# Patient Record
Sex: FEMALE | Race: WHITE | NOT HISPANIC OR LATINO | Employment: OTHER | RURAL
[De-identification: names, ages, dates, MRNs, and addresses within clinical notes are randomized per-mention and may not be internally consistent; named-entity substitution may affect disease eponyms.]

---

## 2017-01-09 ENCOUNTER — OFFICE VISIT (OUTPATIENT)
Dept: FAMILY MEDICINE CLINIC | Facility: CLINIC | Age: 72
End: 2017-01-09

## 2017-01-09 VITALS
HEART RATE: 67 BPM | HEIGHT: 64 IN | RESPIRATION RATE: 16 BRPM | DIASTOLIC BLOOD PRESSURE: 80 MMHG | SYSTOLIC BLOOD PRESSURE: 120 MMHG | WEIGHT: 276 LBS | TEMPERATURE: 98.2 F | BODY MASS INDEX: 47.12 KG/M2

## 2017-01-09 DIAGNOSIS — M54.6 THORACIC BACK PAIN, UNSPECIFIED BACK PAIN LATERALITY, UNSPECIFIED CHRONICITY: Primary | ICD-10-CM

## 2017-01-09 PROCEDURE — 99214 OFFICE O/P EST MOD 30 MIN: CPT | Performed by: NURSE PRACTITIONER

## 2017-01-09 PROCEDURE — 96372 THER/PROPH/DIAG INJ SC/IM: CPT | Performed by: NURSE PRACTITIONER

## 2017-01-09 RX ORDER — BETAMETHASONE SODIUM PHOSPHATE AND BETAMETHASONE ACETATE 3; 3 MG/ML; MG/ML
12 INJECTION, SUSPENSION INTRA-ARTICULAR; INTRALESIONAL; INTRAMUSCULAR; SOFT TISSUE ONCE
Status: COMPLETED | OUTPATIENT
Start: 2017-01-09 | End: 2017-01-09

## 2017-01-09 RX ORDER — TIZANIDINE HYDROCHLORIDE 2 MG/1
2 CAPSULE, GELATIN COATED ORAL 2 TIMES DAILY
Qty: 60 CAPSULE | Refills: 3 | Status: SHIPPED | OUTPATIENT
Start: 2017-01-09 | End: 2017-01-30 | Stop reason: SDUPTHER

## 2017-01-09 RX ADMIN — BETAMETHASONE SODIUM PHOSPHATE AND BETAMETHASONE ACETATE 12 MG: 3; 3 INJECTION, SUSPENSION INTRA-ARTICULAR; INTRALESIONAL; INTRAMUSCULAR; SOFT TISSUE at 10:01

## 2017-01-09 NOTE — PROGRESS NOTES
Subjective   Juliana Alcazar is a 71 y.o. female.     Back Pain   This is a chronic (picked up a 30 lb bag of cat litter and shortly afterward, her back started to hurt.) problem. The current episode started in the past 7 days (thoracic  back pain flaired up about 1 week ago). The problem occurs constantly. The problem has been gradually worsening since onset. The pain is present in the thoracic spine. The quality of the pain is described as aching. The pain does not radiate. The pain is at a severity of 6/10. The pain is moderate. The pain is the same all the time. The symptoms are aggravated by lying down, position, standing and twisting. Stiffness is present in the morning. Pertinent negatives include no abdominal pain, bladder incontinence, bowel incontinence, chest pain, dysuria, fever, headaches, leg pain, numbness, paresis, paresthesias, pelvic pain, perianal numbness, tingling, weakness or weight loss. Risk factors include menopause, obesity and sedentary lifestyle. She has tried analgesics for the symptoms. The treatment provided moderate relief.        The following portions of the patient's history were reviewed and updated as appropriate: allergies, current medications, past family history, past medical history, past social history, past surgical history and problem list.    Review of Systems   Constitutional: Negative.  Negative for fever and weight loss.   HENT: Negative.    Respiratory: Negative.    Cardiovascular: Negative.  Negative for chest pain.   Gastrointestinal: Negative for abdominal pain and bowel incontinence.   Genitourinary: Negative for bladder incontinence, dysuria and pelvic pain.   Musculoskeletal: Positive for back pain (thoracic pain).   Skin: Negative.    Neurological: Negative.  Negative for tingling, weakness, numbness, headaches and paresthesias.   Psychiatric/Behavioral: Negative.        Objective   Physical Exam   Constitutional: She is oriented to person, place, and time. She  appears well-developed and well-nourished. No distress.   HENT:   Head: Normocephalic.   Eyes: Pupils are equal, round, and reactive to light.   Neck: Normal range of motion. Neck supple. No thyromegaly present.   Cardiovascular: Normal rate, regular rhythm and normal heart sounds.  Exam reveals no friction rub.    No murmur heard.  Pulmonary/Chest: Effort normal and breath sounds normal.   Abdominal: Soft.   Musculoskeletal:        Thoracic back: She exhibits tenderness, pain and spasm.   X rays are reviewed and show degenerative changes of thoracic spine.     Neurological: She is alert and oriented to person, place, and time.   Skin: Skin is warm and dry.   Psychiatric: She has a normal mood and affect. Thought content normal.   Nursing note and vitals reviewed.      Assessment/Plan   Juliana was seen today for back pain.    Diagnoses and all orders for this visit:    Thoracic back pain, unspecified back pain laterality, unspecified chronicity  -     XR Spine Thoracic 3 View (In Office)  -     TiZANidine (ZANAFLEX) 2 MG capsule; Take 1 capsule by mouth 2 (Two) Times a Day. For back pain  -     betamethasone acetate-betamethasone sodium phosphate (CELESTONE SOLUSPAN) injection 12 mg; Inject 2 mL into the shoulder, thigh, or buttocks 1 (One) Time.    she was out of her muscle relaxer.  Will refill and give her cortisone today.  Jean-Pierre in about 2 weeks.

## 2017-01-09 NOTE — MR AVS SNAPSHOT
Juliana Alcazar   1/9/2017 9:00 AM   Office Visit    Dept Phone:  [phone]   Encounter #:  [id number]    Provider:  KAVITHA Moreno   Department:  [facility]                Your Full Care Plan              Today's Medication Changes          These changes are accurate as of: 1/9/17 10:01 AM.  If you have any questions, ask your nurse or doctor.               Medication(s)that have changed:     TiZANidine 2 MG capsule   Commonly known as:  ZANAFLEX   Take 1 capsule by mouth 2 (Two) Times a Day. For back pain   What changed:  additional instructions   Changed by:  KAVITHA Moreno            Where to Get Your Medications      These medications were sent to [facility] [address] [place] [address] DRIVE - [phone]  - [phone] [address] [address]     Phone:  [phone]     TiZANidine 2 MG capsule                  Your Updated Medication List          This list is accurate as of: 1/9/17 10:01 AM.  Always use your most recent med list.                atorvastatin 40 MG tablet   Commonly known as:  LIPITOR       diclofenac 75 MG EC tablet   Commonly known as:  VOLTAREN       doxepin 10 MG capsule   Commonly known as:  SINEquan       doxycycline 100 MG capsule   Commonly known as:  VIBRAMYCIN       furosemide 20 MG tablet   Commonly known as:  LASIX       lisinopril 20 MG tablet   Commonly known as:  PRINIVIL,ZESTRIL       metFORMIN 500 MG tablet   Commonly known as:  GLUCOPHAGE       metoprolol tartrate 50 MG tablet   Commonly known as:  LOPRESSOR       omeprazole 20 MG capsule   Commonly known as:  priLOSEC       rivaroxaban 20 MG tablet   Commonly known as:  XARELTO       sertraline 100 MG tablet   Commonly known as:  ZOLOFT       TiZANidine 2 MG capsule   Commonly known as:  ZANAFLEX   Take 1 capsule by mouth 2 (Two) Times a Day. For back pain               We Performed the Following      XR Spine Thoracic 3 View (In Office)       You Were Diagnosed With        Codes Comments    Thoracic back pain, unspecified back pain laterality, unspecified chronicity    -  Primary ICD-10-CM: M54.6  ICD-9-CM: 724.1       Medications to be Given to You by a Medical Professional     Due       Frequency    2017 betamethasone acetate-betamethasone sodium phosphate (CELESTONE SOLUSPAN) injection 12 mg  Once      Instructions     None    Patient Instructions History      Upcoming Appointments     Visit Type Date Time Department    OFFICE VISIT 2017  9:00 AM [facility]    OFFICE VISIT 2017  8:00 AM [facility]      Expo[employer]ert Signup     [facility] [email or web address] allows you to send messages to your doctor, view your test results, renew your prescriptions, schedule appointments, and more. To sign up, go to myThings and click on the Sign Up Now link in the New User? box. Enter your [email or web address] Activation Code exactly as it appears below along with the last four digits of your Social Security Number and your Date of Birth () to complete the sign-up process. If you do not sign up before the expiration date, you must request a new code.    [email or web address] Activation Code: [email or web address]  Expires: 2017 10:01 AM    If you have questions, you can email [email or web address] or call [phone] to talk to our [email or web address] staff. Remember, [email or web address] is NOT to be used for urgent needs. For medical emergencies, dial 911.               Other Info from Your Visit           Your Appointments     2017  8:00 AM CST   Office Visit with KAVITHA Moreno   [facility] (--)    [facility] [place] [address] [place] [address]  [facility] [address] [phone]           Arrive 15 minutes prior to appointment.              Allergies     Adhesive Tape      Celebrex [Celecoxib]      Darvon [Propoxyphene]       "Demerol [Meperidine]      Dilantin [Phenytoin]      Iodinated Diagnostic Agents      Latex      Morphine And Related      Penicillins      Phenergan [Promethazine]      Vioxx [Rofecoxib]      Vistaril [Hydroxyzine Hcl]        Reason for Visit     Back Pain           Vital Signs     Blood Pressure Pulse Temperature Respirations Height Weight    120/80 67 98.2 °F (36.8 °C) 16 64\" (162.6 cm) 276 lb (125 kg)    Body Mass Index Smoking Status                47.38 kg/m2 Never Smoker          Problems and Diagnoses Noted     Back pain    -  Primary        "

## 2017-01-30 ENCOUNTER — OFFICE VISIT (OUTPATIENT)
Dept: FAMILY MEDICINE CLINIC | Facility: CLINIC | Age: 72
End: 2017-01-30

## 2017-01-30 VITALS
TEMPERATURE: 97.9 F | HEIGHT: 64 IN | HEART RATE: 87 BPM | WEIGHT: 270 LBS | SYSTOLIC BLOOD PRESSURE: 160 MMHG | DIASTOLIC BLOOD PRESSURE: 82 MMHG | BODY MASS INDEX: 46.1 KG/M2 | RESPIRATION RATE: 16 BRPM

## 2017-01-30 DIAGNOSIS — K21.9 GASTROESOPHAGEAL REFLUX DISEASE WITHOUT ESOPHAGITIS: ICD-10-CM

## 2017-01-30 DIAGNOSIS — F32.A DEPRESSION, UNSPECIFIED DEPRESSION TYPE: ICD-10-CM

## 2017-01-30 DIAGNOSIS — E11.9 TYPE 2 DIABETES MELLITUS WITHOUT COMPLICATION, WITHOUT LONG-TERM CURRENT USE OF INSULIN (HCC): ICD-10-CM

## 2017-01-30 DIAGNOSIS — E78.5 HYPERLIPIDEMIA, UNSPECIFIED HYPERLIPIDEMIA TYPE: ICD-10-CM

## 2017-01-30 DIAGNOSIS — I10 ESSENTIAL HYPERTENSION: ICD-10-CM

## 2017-01-30 DIAGNOSIS — M54.6 THORACIC BACK PAIN, UNSPECIFIED BACK PAIN LATERALITY, UNSPECIFIED CHRONICITY: Primary | ICD-10-CM

## 2017-01-30 DIAGNOSIS — I48.91 ATRIAL FIBRILLATION, UNSPECIFIED TYPE (HCC): ICD-10-CM

## 2017-01-30 DIAGNOSIS — Z11.59 NEED FOR HEPATITIS C SCREENING TEST: ICD-10-CM

## 2017-01-30 LAB
ALBUMIN SERPL-MCNC: 4.3 G/DL (ref 3.4–4.8)
ALBUMIN/GLOB SERPL: 1.5 G/DL (ref 1.1–1.8)
ALP SERPL-CCNC: 65 U/L (ref 38–126)
ALT SERPL W P-5'-P-CCNC: 31 U/L (ref 9–52)
ANION GAP SERPL CALCULATED.3IONS-SCNC: 10 MMOL/L (ref 5–15)
ARTICHOKE IGE QN: 93 MG/DL (ref 1–129)
AST SERPL-CCNC: 21 U/L (ref 14–36)
BILIRUB SERPL-MCNC: 0.6 MG/DL (ref 0.2–1.3)
BUN BLD-MCNC: 13 MG/DL (ref 7–21)
BUN/CREAT SERPL: 18.3 (ref 7–25)
CALCIUM SPEC-SCNC: 9.6 MG/DL (ref 8.4–10.2)
CHLORIDE SERPL-SCNC: 101 MMOL/L (ref 95–110)
CHOLEST SERPL-MCNC: 176 MG/DL (ref 0–199)
CO2 SERPL-SCNC: 30 MMOL/L (ref 22–31)
CREAT BLD-MCNC: 0.71 MG/DL (ref 0.5–1)
GFR SERPL CREATININE-BSD FRML MDRD: 81 ML/MIN/1.73 (ref 39–90)
GLOBULIN UR ELPH-MCNC: 2.8 GM/DL (ref 2.3–3.5)
GLUCOSE BLD-MCNC: 105 MG/DL (ref 60–100)
HBA1C MFR BLD: 6.32 % (ref 4–5.6)
HDLC SERPL-MCNC: 32 MG/DL (ref 60–200)
LDLC/HDLC SERPL: 2.32 {RATIO} (ref 0–3.22)
POTASSIUM BLD-SCNC: 4.7 MMOL/L (ref 3.5–5.1)
PROT SERPL-MCNC: 7.1 G/DL (ref 6.3–8.6)
SODIUM BLD-SCNC: 141 MMOL/L (ref 137–145)
TRIGL SERPL-MCNC: 349 MG/DL (ref 20–199)

## 2017-01-30 PROCEDURE — 99214 OFFICE O/P EST MOD 30 MIN: CPT | Performed by: NURSE PRACTITIONER

## 2017-01-30 PROCEDURE — 80053 COMPREHEN METABOLIC PANEL: CPT | Performed by: NURSE PRACTITIONER

## 2017-01-30 PROCEDURE — 83036 HEMOGLOBIN GLYCOSYLATED A1C: CPT | Performed by: NURSE PRACTITIONER

## 2017-01-30 PROCEDURE — 80061 LIPID PANEL: CPT | Performed by: NURSE PRACTITIONER

## 2017-01-30 PROCEDURE — 86803 HEPATITIS C AB TEST: CPT | Performed by: NURSE PRACTITIONER

## 2017-01-30 RX ORDER — SERTRALINE HYDROCHLORIDE 100 MG/1
100 TABLET, FILM COATED ORAL DAILY
Qty: 30 TABLET | Refills: 5 | Status: SHIPPED | OUTPATIENT
Start: 2017-01-30 | End: 2017-01-30 | Stop reason: SDUPTHER

## 2017-01-30 RX ORDER — TRAMADOL HYDROCHLORIDE 50 MG/1
50 TABLET ORAL EVERY 8 HOURS PRN
Qty: 45 TABLET | Refills: 0 | Status: SHIPPED | OUTPATIENT
Start: 2017-01-30 | End: 2017-03-30

## 2017-01-30 RX ORDER — ATORVASTATIN CALCIUM 40 MG/1
40 TABLET, FILM COATED ORAL NIGHTLY
Qty: 30 TABLET | Refills: 5 | Status: SHIPPED | OUTPATIENT
Start: 2017-01-30 | End: 2017-02-27 | Stop reason: SDUPTHER

## 2017-01-30 RX ORDER — TIZANIDINE HYDROCHLORIDE 2 MG/1
2 CAPSULE, GELATIN COATED ORAL 2 TIMES DAILY
Qty: 60 CAPSULE | Refills: 5 | Status: SHIPPED | OUTPATIENT
Start: 2017-01-30 | End: 2017-02-27 | Stop reason: SDUPTHER

## 2017-01-30 RX ORDER — SERTRALINE HYDROCHLORIDE 100 MG/1
TABLET, FILM COATED ORAL
Qty: 30 TABLET | Refills: 5 | Status: SHIPPED | OUTPATIENT
Start: 2017-01-30 | End: 2017-02-27 | Stop reason: SDUPTHER

## 2017-01-30 RX ORDER — DICLOFENAC SODIUM 75 MG/1
75 TABLET, DELAYED RELEASE ORAL 2 TIMES DAILY
Qty: 60 TABLET | Refills: 5 | Status: SHIPPED | OUTPATIENT
Start: 2017-01-30 | End: 2017-02-27 | Stop reason: SDUPTHER

## 2017-01-30 RX ORDER — FUROSEMIDE 20 MG/1
20 TABLET ORAL DAILY
Qty: 30 TABLET | Refills: 5 | Status: SHIPPED | OUTPATIENT
Start: 2017-01-30 | End: 2017-02-27 | Stop reason: SDUPTHER

## 2017-01-30 RX ORDER — METOPROLOL TARTRATE 50 MG/1
50 TABLET, FILM COATED ORAL 2 TIMES DAILY
Qty: 60 TABLET | Refills: 5 | Status: SHIPPED | OUTPATIENT
Start: 2017-01-30 | End: 2017-02-27 | Stop reason: SDUPTHER

## 2017-01-30 RX ORDER — OMEPRAZOLE 20 MG/1
20 CAPSULE, DELAYED RELEASE ORAL 2 TIMES DAILY
Qty: 60 CAPSULE | Refills: 5 | Status: SHIPPED | OUTPATIENT
Start: 2017-01-30 | End: 2017-02-27 | Stop reason: SDUPTHER

## 2017-01-30 RX ORDER — LISINOPRIL 20 MG/1
20 TABLET ORAL DAILY
Qty: 30 TABLET | Refills: 5 | Status: SHIPPED | OUTPATIENT
Start: 2017-01-30 | End: 2017-02-27 | Stop reason: SDUPTHER

## 2017-01-30 NOTE — PROGRESS NOTES
Subjective   Juliana Alcazar is a 71 y.o. female.     Back Pain   This is a recurrent problem. The current episode started more than 1 month ago. The problem occurs intermittently. The problem has been waxing and waning (severe pain happens about 2 or 3 x a week now.) since onset. The pain is present in the thoracic spine. The pain is at a severity of 6/10. The pain is moderate. Exacerbated by: using arms. Stiffness is present in the morning. Pertinent negatives include no abdominal pain, bladder incontinence, bowel incontinence, chest pain, dysuria, fever, headaches, leg pain, numbness, paresis, paresthesias, pelvic pain, perianal numbness, tingling, weakness or weight loss. She has tried muscle relaxant, NSAIDs and analgesics for the symptoms. The treatment provided moderate relief.   Diabetes   She presents for her follow-up diabetic visit. She has type 2 diabetes mellitus. Her disease course has been stable. There are no hypoglycemic associated symptoms. Pertinent negatives for hypoglycemia include no headaches. There are no diabetic associated symptoms. Pertinent negatives for diabetes include no chest pain, no weakness and no weight loss. There are no hypoglycemic complications. Symptoms are stable. There are no diabetic complications. Risk factors for coronary artery disease include diabetes mellitus, obesity, sedentary lifestyle, post-menopausal, hypertension and family history. Current diabetic treatment includes oral agent (monotherapy). She is compliant with treatment most of the time. Her weight is stable. She is following a diabetic diet. When asked about meal planning, she reported none. She has not had a previous visit with a dietitian. She rarely participates in exercise. She monitors blood glucose at home 3-4 x per week. Her breakfast blood glucose is taken between 6-7 am. Her breakfast blood glucose range is generally 130-140 mg/dl. An ACE inhibitor/angiotensin II receptor blocker is being taken.  She does not see a podiatrist.Eye exam is current.        The following portions of the patient's history were reviewed and updated as appropriate: allergies, current medications, past family history, past medical history, past social history, past surgical history and problem list.    Review of Systems   Constitutional: Negative.  Negative for fever and weight loss.   HENT: Negative.    Respiratory: Negative.    Cardiovascular: Negative.  Negative for chest pain.   Gastrointestinal: Negative for abdominal pain and bowel incontinence.   Genitourinary: Negative for bladder incontinence, dysuria and pelvic pain.   Musculoskeletal: Back pain: cont to have thoracic back pain  due to lifting heavy object.   Neurological: Negative.  Negative for tingling, weakness, numbness, headaches and paresthesias.   Psychiatric/Behavioral: Negative.        Objective   Physical Exam   Constitutional: She is oriented to person, place, and time. She appears well-developed and well-nourished. No distress.   HENT:   Head: Atraumatic.   Eyes: EOM are normal. Pupils are equal, round, and reactive to light.   Neck: Normal range of motion. Neck supple. No thyromegaly present.   Cardiovascular: Normal rate, regular rhythm and normal heart sounds.  Exam reveals no friction rub.    No murmur heard.  Pulmonary/Chest: Effort normal and breath sounds normal. No respiratory distress. She has no wheezes. She has no rales.   Abdominal: Soft. Bowel sounds are normal. She exhibits no distension.   Musculoskeletal:        Thoracic back: She exhibits decreased range of motion, tenderness, pain and spasm.   Neurological: She is alert and oriented to person, place, and time.   Skin: Skin is warm and dry.   Psychiatric: She has a normal mood and affect. Thought content normal.   Nursing note and vitals reviewed.      Assessment/Plan   Juliana was seen today for med refill.    Diagnoses and all orders for this visit:    Thoracic back pain, unspecified back pain  laterality, unspecified chronicity  -     diclofenac (VOLTAREN) 75 MG EC tablet; Take 1 tablet by mouth 2 (Two) Times a Day.  -     TiZANidine (ZANAFLEX) 2 MG capsule; Take 1 capsule by mouth 2 (Two) Times a Day. For back pain  -     traMADol (ULTRAM) 50 MG tablet; Take 1 tablet by mouth Every 8 (Eight) Hours As Needed for moderate pain (4-6).    Hyperlipidemia, unspecified hyperlipidemia type  -     Lipid Panel  -     atorvastatin (LIPITOR) 40 MG tablet; Take 1 tablet by mouth Every Night.    Essential hypertension  -     Comprehensive Metabolic Panel  -     metoprolol tartrate (LOPRESSOR) 50 MG tablet; Take 1 tablet by mouth 2 (Two) Times a Day.  -     lisinopril (PRINIVIL,ZESTRIL) 20 MG tablet; Take 1 tablet by mouth Daily.  -     furosemide (LASIX) 20 MG tablet; Take 1 tablet by mouth Daily.    Type 2 diabetes mellitus without complication, without long-term current use of insulin  -     Hemoglobin A1c  -     metFORMIN (GLUCOPHAGE) 500 MG tablet; Take 1 tablet by mouth Daily With Breakfast.    Need for hepatitis C screening test  -     Hepatitis C Antibody    Gastroesophageal reflux disease without esophagitis  -     omeprazole (priLOSEC) 20 MG capsule; Take 1 capsule by mouth 2 (Two) Times a Day.    Atrial fibrillation, unspecified type  -     rivaroxaban (XARELTO) 20 MG tablet; Take 1 tablet by mouth Daily.    Depression, unspecified depression type  -     Discontinue: sertraline (ZOLOFT) 100 MG tablet; Take 1 tablet by mouth Daily. 1 tab in am and half a tab in pm      Health maintenance is updated.  She will be scheduled to see gastro to discuss a screening colonoscopy.  Also she is given refills on her meds.  Tramadol is added for more severe back pain.  She will also have labs drawn.

## 2017-01-30 NOTE — MR AVS SNAPSHOT
Juliana Alcazar   1/30/2017 8:00 AM   Office Visit    Dept Phone:  [phone]   Encounter #:  [id number]    Provider:  KAVITHA Moreno   Department:  [facility]                Your Full Care Plan              Today's Medication Changes          These changes are accurate as of: 1/30/17  8:56 AM.  If you have any questions, ask your nurse or doctor.               New Medication(s)Ordered:     traMADol 50 MG tablet   Commonly known as:  ULTRAM   Take 1 tablet by mouth Every 8 (Eight) Hours As Needed for moderate pain (4-6).   Started by:  KAVITHA Moreno         Medication(s)that have changed:     metFORMIN 500 MG tablet   Commonly known as:  GLUCOPHAGE   Take 1 tablet by mouth Daily With Breakfast.   What changed:  when to take this   Changed by:  KAVITHA Moreno       sertraline 100 MG tablet   Commonly known as:  ZOLOFT   Take 1 tablet by mouth Daily. 1 tab in am and half a tab in pm   What changed:    - how much to take  - when to take this   Changed by:  KAVITHA Moreno            Where to Get Your Medications      These medications were sent to [facility] [address] [place] [address] - [phone]  - [phone] [place] [address]     Phone:  [phone]     atorvastatin 40 MG tablet    diclofenac 75 MG EC tablet    furosemide 20 MG tablet    lisinopril 20 MG tablet    metFORMIN 500 MG tablet    metoprolol tartrate 50 MG tablet    omeprazole 20 MG capsule    rivaroxaban 20 MG tablet    sertraline 100 MG tablet    TiZANidine 2 MG capsule         You can get these medications from any pharmacy     Bring a paper prescription for each of these medications     traMADol 50 MG tablet                  Your Updated Medication List          This list is accurate as of: 1/30/17  8:56 AM.  Always use your most recent med list.                atorvastatin 40 MG tablet      Commonly known as:  LIPITOR   Take 1 tablet by mouth Every Night.       diclofenac 75 MG EC tablet   Commonly known as:  VOLTAREN   Take 1 tablet by mouth 2 (Two) Times a Day.       doxepin 10 MG capsule   Commonly known as:  SINEquan       doxycycline 100 MG capsule   Commonly known as:  VIBRAMYCIN       furosemide 20 MG tablet   Commonly known as:  LASIX   Take 1 tablet by mouth Daily.       lisinopril 20 MG tablet   Commonly known as:  PRINIVIL,ZESTRIL   Take 1 tablet by mouth Daily.       metFORMIN 500 MG tablet   Commonly known as:  GLUCOPHAGE   Take 1 tablet by mouth Daily With Breakfast.       metoprolol tartrate 50 MG tablet   Commonly known as:  LOPRESSOR   Take 1 tablet by mouth 2 (Two) Times a Day.       omeprazole 20 MG capsule   Commonly known as:  priLOSEC   Take 1 capsule by mouth 2 (Two) Times a Day.       rivaroxaban 20 MG tablet   Commonly known as:  XARELTO   Take 1 tablet by mouth Daily.       sertraline 100 MG tablet   Commonly known as:  ZOLOFT   Take 1 tablet by mouth Daily. 1 tab in am and half a tab in pm       TiZANidine 2 MG capsule   Commonly known as:  ZANAFLEX   Take 1 capsule by mouth 2 (Two) Times a Day. For back pain       traMADol 50 MG tablet   Commonly known as:  ULTRAM   Take 1 tablet by mouth Every 8 (Eight) Hours As Needed for moderate pain (4-6).               We Performed the Following     Comprehensive Metabolic Panel     Hemoglobin A1c     Hepatitis C Antibody     Lipid Panel       You Were Diagnosed With        Codes Comments    Essential hypertension    -  Primary ICD-10-CM: I10  ICD-9-CM: 401.9     Hyperlipidemia, unspecified hyperlipidemia type     ICD-10-CM: E78.5  ICD-9-CM: 272.4     Type 2 diabetes mellitus without complication, without long-term current use of insulin     ICD-10-CM: E11.9  ICD-9-CM: 250.00     Need for hepatitis C screening test     ICD-10-CM: Z11.59  ICD-9-CM: V73.89     Thoracic back pain, unspecified back pain laterality, unspecified chronicity  "    ICD-10-CM: M54.6  ICD-9-CM: 724.1     Gastroesophageal reflux disease without esophagitis     ICD-10-CM: K21.9  ICD-9-CM: 530.81     Atrial fibrillation, unspecified type     ICD-10-CM: I48.91  ICD-9-CM: 427.31     Depression, unspecified depression type     ICD-10-CM: F32.9  ICD-9-CM: 311       Instructions     None    Patient Instructions History      Upcoming Appointments     Visit Type Date Time Department    OFFICE VISIT 1/30/2017  8:00 AM [facility]      MyChart Signup     Our records indicate that you have declined [facility] signup. If you would like to sign up for [employer], please email [email or web address] or call [phone] to obtain an activation code.             Other Info from Your Visit           Allergies     Adhesive Tape      Celebrex [Celecoxib]      Darvon [Propoxyphene]      Demerol [Meperidine]      Dilantin [Phenytoin]      Iodinated Diagnostic Agents      Latex      Morphine And Related      Penicillins      Phenergan [Promethazine]      Vioxx [Rofecoxib]      Vistaril [Hydroxyzine Hcl]        Reason for Visit     Med Refill           Vital Signs     Blood Pressure Pulse Temperature Respirations Height Weight    160/82 87 97.9 °F (36.6 °C) 16 64\" (162.6 cm) 270 lb (122 kg)    Body Mass Index Smoking Status                46.35 kg/m2 Never Smoker          Problems and Diagnoses Noted     Back pain    High blood pressure    -  Primary    High cholesterol or triglycerides        Type 2 diabetes mellitus without complication, without long-term current use of insulin        Need for hepatitis C screening test        Acid reflux disease        Atrial fibrillation (irregular heartbeat)        Depression            "

## 2017-01-31 DIAGNOSIS — E78.5 HYPERLIPIDEMIA, UNSPECIFIED HYPERLIPIDEMIA TYPE: Primary | ICD-10-CM

## 2017-01-31 LAB
HCV AB SER DONR QL: NEGATIVE
HCV S/C RATIO: 0.02 (ref 0–0.89)

## 2017-01-31 RX ORDER — FENOFIBRATE 48 MG/1
48 TABLET, COATED ORAL DAILY
Qty: 30 TABLET | Refills: 5 | Status: SHIPPED | OUTPATIENT
Start: 2017-01-31 | End: 2017-02-27 | Stop reason: SDUPTHER

## 2017-02-27 DIAGNOSIS — M54.6 THORACIC BACK PAIN, UNSPECIFIED BACK PAIN LATERALITY, UNSPECIFIED CHRONICITY: ICD-10-CM

## 2017-02-27 DIAGNOSIS — F32.A DEPRESSION, UNSPECIFIED DEPRESSION TYPE: ICD-10-CM

## 2017-02-27 DIAGNOSIS — E11.9 TYPE 2 DIABETES MELLITUS WITHOUT COMPLICATION, WITHOUT LONG-TERM CURRENT USE OF INSULIN (HCC): ICD-10-CM

## 2017-02-27 DIAGNOSIS — K21.9 GASTROESOPHAGEAL REFLUX DISEASE WITHOUT ESOPHAGITIS: ICD-10-CM

## 2017-02-27 DIAGNOSIS — E78.5 HYPERLIPIDEMIA, UNSPECIFIED HYPERLIPIDEMIA TYPE: ICD-10-CM

## 2017-02-27 DIAGNOSIS — I48.91 ATRIAL FIBRILLATION, UNSPECIFIED TYPE (HCC): ICD-10-CM

## 2017-02-27 DIAGNOSIS — I10 ESSENTIAL HYPERTENSION: ICD-10-CM

## 2017-02-27 RX ORDER — METOPROLOL TARTRATE 50 MG/1
50 TABLET, FILM COATED ORAL 2 TIMES DAILY
Qty: 180 TABLET | Refills: 1 | Status: SHIPPED | OUTPATIENT
Start: 2017-02-27 | End: 2018-03-29 | Stop reason: SDUPTHER

## 2017-02-27 RX ORDER — FENOFIBRATE 48 MG/1
48 TABLET, COATED ORAL DAILY
Qty: 30 TABLET | Refills: 5 | Status: SHIPPED | OUTPATIENT
Start: 2017-02-27 | End: 2017-03-07

## 2017-02-27 RX ORDER — ATORVASTATIN CALCIUM 40 MG/1
40 TABLET, FILM COATED ORAL NIGHTLY
Qty: 90 TABLET | Refills: 1 | Status: SHIPPED | OUTPATIENT
Start: 2017-02-27 | End: 2018-03-29 | Stop reason: SDUPTHER

## 2017-02-27 RX ORDER — TIZANIDINE HYDROCHLORIDE 2 MG/1
2 CAPSULE, GELATIN COATED ORAL 2 TIMES DAILY
Qty: 180 CAPSULE | Refills: 1 | Status: SHIPPED | OUTPATIENT
Start: 2017-02-27 | End: 2017-03-07

## 2017-02-27 RX ORDER — SERTRALINE HYDROCHLORIDE 100 MG/1
TABLET, FILM COATED ORAL
Qty: 135 TABLET | Refills: 1 | Status: SHIPPED | OUTPATIENT
Start: 2017-02-27 | End: 2018-03-29 | Stop reason: SDUPTHER

## 2017-02-27 RX ORDER — FUROSEMIDE 20 MG/1
20 TABLET ORAL DAILY
Qty: 90 TABLET | Refills: 1 | Status: SHIPPED | OUTPATIENT
Start: 2017-02-27 | End: 2018-03-29 | Stop reason: SDUPTHER

## 2017-02-27 RX ORDER — DICLOFENAC SODIUM 75 MG/1
75 TABLET, DELAYED RELEASE ORAL 2 TIMES DAILY
Qty: 180 TABLET | Refills: 1 | Status: SHIPPED | OUTPATIENT
Start: 2017-02-27 | End: 2017-03-07 | Stop reason: SDUPTHER

## 2017-02-27 RX ORDER — OMEPRAZOLE 20 MG/1
20 CAPSULE, DELAYED RELEASE ORAL 2 TIMES DAILY
Qty: 180 CAPSULE | Refills: 1 | Status: SHIPPED | OUTPATIENT
Start: 2017-02-27 | End: 2017-06-20 | Stop reason: SDDI

## 2017-02-27 RX ORDER — LISINOPRIL 20 MG/1
20 TABLET ORAL DAILY
Qty: 90 TABLET | Refills: 1 | Status: SHIPPED | OUTPATIENT
Start: 2017-02-27 | End: 2018-03-29 | Stop reason: SDUPTHER

## 2017-02-27 RX ORDER — SERTRALINE HYDROCHLORIDE 100 MG/1
TABLET, FILM COATED ORAL
Qty: 90 TABLET | Refills: 1 | Status: SHIPPED | OUTPATIENT
Start: 2017-02-27 | End: 2017-02-27 | Stop reason: SDUPTHER

## 2017-03-07 ENCOUNTER — OFFICE VISIT (OUTPATIENT)
Dept: FAMILY MEDICINE CLINIC | Facility: CLINIC | Age: 72
End: 2017-03-07

## 2017-03-07 VITALS
WEIGHT: 273 LBS | HEIGHT: 63 IN | RESPIRATION RATE: 16 BRPM | SYSTOLIC BLOOD PRESSURE: 114 MMHG | TEMPERATURE: 98.6 F | DIASTOLIC BLOOD PRESSURE: 66 MMHG | HEART RATE: 78 BPM | BODY MASS INDEX: 48.37 KG/M2

## 2017-03-07 DIAGNOSIS — M25.551 ARTHRALGIA OF RIGHT HIP: ICD-10-CM

## 2017-03-07 DIAGNOSIS — E78.5 HYPERLIPIDEMIA, UNSPECIFIED HYPERLIPIDEMIA TYPE: ICD-10-CM

## 2017-03-07 DIAGNOSIS — M54.6 THORACIC BACK PAIN, UNSPECIFIED BACK PAIN LATERALITY, UNSPECIFIED CHRONICITY: Primary | ICD-10-CM

## 2017-03-07 PROCEDURE — 99213 OFFICE O/P EST LOW 20 MIN: CPT | Performed by: NURSE PRACTITIONER

## 2017-03-07 RX ORDER — FENOFIBRATE 54 MG/1
54 TABLET ORAL DAILY
Qty: 90 TABLET | Refills: 1 | Status: SHIPPED | OUTPATIENT
Start: 2017-03-07 | End: 2018-03-29 | Stop reason: SDUPTHER

## 2017-03-07 RX ORDER — DICLOFENAC SODIUM 75 MG/1
75 TABLET, DELAYED RELEASE ORAL 2 TIMES DAILY
Qty: 180 TABLET | Refills: 1 | Status: SHIPPED | OUTPATIENT
Start: 2017-03-07 | End: 2017-03-28 | Stop reason: SINTOL

## 2017-03-07 NOTE — PROGRESS NOTES
Subjective   Juliana Alcazar is a 71 y.o. female.     HPI Comments: Here today for nissa on chronic back pain.  She says that her insurance will no longer pay for zanaflex.  She wants to go back on voltaren for her chronic pain.  She says it does help.  She has seen dr pennington for her chronic knee and hip pain.  She was told that she might need a knee replacement.    Back Pain   This is a chronic problem. The current episode started more than 1 year ago. The problem occurs constantly. The problem has been waxing and waning since onset. The pain is present in the lumbar spine. The pain radiates to the left thigh. The pain is at a severity of 8/10. The pain is moderate. The pain is the same all the time. The symptoms are aggravated by standing and coughing. Stiffness is present in the morning. Pertinent negatives include no abdominal pain, bladder incontinence, bowel incontinence, chest pain, dysuria, fever, headaches, leg pain, numbness, paresis, paresthesias, pelvic pain, perianal numbness, tingling, weakness or weight loss. Risk factors include obesity and lack of exercise. She has tried muscle relaxant and NSAIDs for the symptoms. The treatment provided moderate relief.        The following portions of the patient's history were reviewed and updated as appropriate: allergies, current medications, past family history, past medical history, past social history, past surgical history and problem list.    Review of Systems   Constitutional: Negative.  Negative for fever and weight loss.   HENT: Negative.    Respiratory: Negative.    Cardiovascular: Negative.  Negative for chest pain.   Gastrointestinal: Negative for abdominal pain and bowel incontinence.   Genitourinary: Negative for bladder incontinence, dysuria and pelvic pain.   Musculoskeletal: Positive for back pain.   Skin: Negative.    Neurological: Negative.  Negative for tingling, weakness, numbness, headaches and paresthesias.   Psychiatric/Behavioral:  Negative.        Objective   Physical Exam   Constitutional: She is oriented to person, place, and time. She appears well-developed and well-nourished. No distress.   HENT:   Head: Normocephalic.   Eyes: EOM are normal. Pupils are equal, round, and reactive to light.   Neck: Normal range of motion. Neck supple. No thyromegaly present.   Cardiovascular: Normal rate, regular rhythm and normal heart sounds.  Exam reveals no friction rub.    No murmur heard.  Pulmonary/Chest: Effort normal and breath sounds normal. No respiratory distress. She has no wheezes.   Abdominal: Soft.   Musculoskeletal:        Lumbar back: She exhibits decreased range of motion, tenderness, pain and spasm.   Neurological: She is alert and oriented to person, place, and time.   Skin: Skin is warm.   Psychiatric: She has a normal mood and affect. Thought content normal.   Nursing note and vitals reviewed.      Assessment/Plan   Juliana was seen today for back pain.    Diagnoses and all orders for this visit:    Thoracic back pain, unspecified back pain laterality, unspecified chronicity  -     diclofenac (VOLTAREN) 75 MG EC tablet; Take 1 tablet by mouth 2 (Two) Times a Day.    Hyperlipidemia, unspecified hyperlipidemia type  -     fenofibrate (LOFIBRA) 54 MG tablet; Take 1 tablet by mouth Daily.    Arthralgia of right hip  -     diclofenac (VOLTAREN) 75 MG EC tablet; Take 1 tablet by mouth 2 (Two) Times a Day.      Her insurance company will not pay for tricor, but will pay for lofibra and she is sent a new script for this med for her chol.  She will cont to take voltaren and new script for this med is sent to her pharmacy as well.

## 2017-03-28 ENCOUNTER — OFFICE VISIT (OUTPATIENT)
Dept: GASTROENTEROLOGY | Facility: CLINIC | Age: 72
End: 2017-03-28

## 2017-03-28 VITALS
HEART RATE: 77 BPM | SYSTOLIC BLOOD PRESSURE: 128 MMHG | DIASTOLIC BLOOD PRESSURE: 82 MMHG | BODY MASS INDEX: 46.78 KG/M2 | WEIGHT: 274 LBS | HEIGHT: 64 IN

## 2017-03-28 DIAGNOSIS — R13.10 DYSPHAGIA, UNSPECIFIED TYPE: ICD-10-CM

## 2017-03-28 DIAGNOSIS — K21.9 GASTROESOPHAGEAL REFLUX DISEASE, ESOPHAGITIS PRESENCE NOT SPECIFIED: ICD-10-CM

## 2017-03-28 DIAGNOSIS — Z12.11 ENCOUNTER FOR SCREENING FOR MALIGNANT NEOPLASM OF COLON: ICD-10-CM

## 2017-03-28 DIAGNOSIS — R11.0 NAUSEA: Primary | ICD-10-CM

## 2017-03-28 PROCEDURE — 99203 OFFICE O/P NEW LOW 30 MIN: CPT | Performed by: PHYSICIAN ASSISTANT

## 2017-03-28 RX ORDER — TIZANIDINE 2 MG/1
TABLET ORAL
COMMUNITY
Start: 2017-02-27 | End: 2017-06-09 | Stop reason: DRUGHIGH

## 2017-03-28 RX ORDER — SODIUM CHLORIDE 0.9 % (FLUSH) 0.9 %
1-10 SYRINGE (ML) INJECTION AS NEEDED
Status: CANCELLED | OUTPATIENT
Start: 2017-03-28

## 2017-03-28 RX ORDER — DEXTROSE AND SODIUM CHLORIDE 5; .45 G/100ML; G/100ML
30 INJECTION, SOLUTION INTRAVENOUS CONTINUOUS PRN
Status: CANCELLED | OUTPATIENT
Start: 2017-03-28

## 2017-03-28 RX ORDER — POLYETHYLENE GLYCOL 3350 17 G/17G
17 POWDER, FOR SOLUTION ORAL ONCE
Qty: 250 G | Refills: 0 | Status: SHIPPED | OUTPATIENT
Start: 2017-03-28 | End: 2017-03-28

## 2017-03-28 NOTE — PROGRESS NOTES
Chief Complaint   Patient presents with   • Eval For Colonoscopy     Ref. Miranda ORTIZ PROCEDURE ORDERED: EGD eval N/V dysphagia, prev lap band. Colon screen    Subjective    Juliana Alcazar is a 71 y.o. female. she is being seen for consultation today at the request of KAVITHA Moreno    History of Present Illness    This 71-year-old retired female was sent for evaluation for abdominal pain from Miranda who saw the patient on 3/7/17 with back pain.  Patient had a previous lap band placed in , she's not been back for a follow-up in some time.  She states her surgeon spoke harshly to her sister and she did not return to him.  She thinks her last colonoscopy was more than 10 years ago.  She thinks it was normal.  She currently denies abdominal pain, she is on Prilosec 20 mg twice a day for chronic GERD.  Since her lap band she's had difficulty with dysphagia, especially with bread.  But she has some intermittent difficulty as well.  She's had no follow-up on her LAP-BAND.  Bowel movements are regular without blood or mucus.  She is really not lost a lot of weight.    Laboratory on 17 showed a negative hepatitis C antibody.  Hemoglobin A1c was 6.32.  CMP showed glucose 105, otherwise normal.  Cholesterol panel showed triglycerides 349, otherwise normal.    Patient currently denies tobacco, alcohol, illicit substance use.  Patient has a history of diabetes, depression, atrial fibrillation with ablation in 2013 at [facility].  She's had LAP-BAND in  at [facility] in [place].  She had an ileojejunal bypass in .  Right knee replacement.  Family history diabetes, heart disease, unknown cancer, hypertension.  Father  age 69 with a AAA.  Mother  age 68 with lung cancer.  Spouse in good health,  since , previously  13 years.  One sister in good health, one sister  of lung cancer.  2 children in good health.    A/P: GERD with dysphagia, it is  possible her lap bland has slipped.  Consider peptic stricture.  Recommend EGD with possible dilatation to evaluate.  Would consider further radiographic studies to evaluate.  She is overdue for screening colonoscopy and this is scheduled as well.  We'll see her in follow-up after the above, further pending clinical course and the results of the above.    Thank you very much Anna for this consultation and for allowing us to participate in the care of your patient.  We'll keep you informed.     The following portions of the patient's history were reviewed and updated as appropriate:   Past Medical History:   Diagnosis Date   • Acute sinusitis    • Anorectal fistula     Anorectal fistula - status post repair      • Atrial fibrillation    • Backache    • Carpal tunnel syndrome    • Chest pain, non-cardiac    • Degenerative joint disease involving multiple joints    • Depressive disorder    • Diarrhea    • Dyspnea    • Electrocardiogram abnormal    • Essential hypertension    • Female stress incontinence    • Generalized anxiety disorder    • GERD (gastroesophageal reflux disease)    • H/O tubal ligation    • Hemorrhoids    • Hypercholesterolemia    • Hyperlipidemia    • Hypertensive disorder    • Low back pain     C/O - low back pain      • Normal gynecologic examination    • Obesity    • Otitis media, left    • Palpitations     a   • Primary fibromyalgia syndrome    • Sleep apnea    • Tachycardia    • Type 2 diabetes mellitus      Past Surgical History:   Procedure Laterality Date   • CARDIAC ABLATION     • ENDOSCOPY AND COLONOSCOPY  09/27/2000     A single small sessile polyp was seen in the rectum which measured 1 mm.211.3 External hemorrhoids were present. 455.3    • INJECTION OF MEDICATION  01/07/2016    Celestone (betamethasone) (2)        • KNEE ARTHROSCOPY  07/09/2008     Right total knee atthroplasty. Osteoarthritis of the right knee.    • STOMACH SURGERY  1976    Revise stomach-bowel fusion (1)    history  of jejunal surgery    • TONSILLECTOMY     • TUBAL ABDOMINAL LIGATION       Family History   Problem Relation Age of Onset   • Hypertension Other    • Lung cancer Other    • Diabetes Other    • Heart disease Other      OB History     No data available        Allergies   Allergen Reactions   • Adhesive Tape    • Celebrex [Celecoxib]    • Darvon [Propoxyphene]    • Demerol [Meperidine]    • Dilantin [Phenytoin]    • Dilaudid [Hydromorphone Hcl]    • Iodinated Diagnostic Agents    • Latex    • Morphine And Related    • Nsaids    • Penicillins    • Phenergan [Promethazine]    • Vioxx [Rofecoxib]    • Vistaril [Hydroxyzine Hcl]      Social History     Social History   • Marital status:      Spouse name: N/A   • Number of children: N/A   • Years of education: N/A     Social History Main Topics   • Smoking status: Never Smoker   • Smokeless tobacco: Never Used   • Alcohol use No   • Drug use: No   • Sexual activity: Defer     Other Topics Concern   • None     Social History Narrative       Current Outpatient Prescriptions:   •  atorvastatin (LIPITOR) 40 MG tablet, Take 1 tablet by mouth Every Night., Disp: 90 tablet, Rfl: 1  •  fenofibrate (LOFIBRA) 54 MG tablet, Take 1 tablet by mouth Daily., Disp: 90 tablet, Rfl: 1  •  furosemide (LASIX) 20 MG tablet, Take 1 tablet by mouth Daily., Disp: 90 tablet, Rfl: 1  •  lisinopril (PRINIVIL,ZESTRIL) 20 MG tablet, Take 1 tablet by mouth Daily., Disp: 90 tablet, Rfl: 1  •  metFORMIN (GLUCOPHAGE) 500 MG tablet, Take 1 tablet by mouth Daily With Breakfast., Disp: 90 tablet, Rfl: 1  •  metoprolol tartrate (LOPRESSOR) 50 MG tablet, Take 1 tablet by mouth 2 (Two) Times a Day., Disp: 180 tablet, Rfl: 1  •  omeprazole (priLOSEC) 20 MG capsule, Take 1 capsule by mouth 2 (Two) Times a Day., Disp: 180 capsule, Rfl: 1  •  rivaroxaban (XARELTO) 20 MG tablet, Take 1 tablet by mouth Daily., Disp: 90 tablet, Rfl: 1  •  sertraline (ZOLOFT) 100 MG tablet, 1 tab in am and half a tab in pm, Disp:  "135 tablet, Rfl: 1  •  tiZANidine (ZANAFLEX) 2 MG tablet, , Disp: , Rfl:   Review of Systems  Review of Systems   Constitutional: Positive for unexpected weight change.   Gastrointestinal: Positive for nausea and vomiting.   All other systems reviewed and are negative.         Objective    /82 (BP Location: Left arm)  Pulse 77  Ht 64\" (162.6 cm)  Wt 274 lb (124 kg)  BMI 47.03 kg/m2  Physical Exam   Constitutional: She is oriented to person, place, and time. She appears well-developed and well-nourished. No distress.   HENT:   Head: Normocephalic and atraumatic.   Eyes: EOM are normal. Pupils are equal, round, and reactive to light.   Neck: Normal range of motion.   Cardiovascular: Normal rate, regular rhythm and normal heart sounds.    Pulmonary/Chest: Effort normal and breath sounds normal.   Abdominal: Soft. Bowel sounds are normal. She exhibits no shifting dullness, no distension, no abdominal bruit, no ascites and no mass. There is no hepatosplenomegaly. There is tenderness. There is no rigidity, no rebound, no guarding and no CVA tenderness. No hernia. Hernia confirmed negative in the ventral area.   Mild obese, port   Musculoskeletal: Normal range of motion.   Neurological: She is alert and oriented to person, place, and time.   Skin: Skin is warm and dry.   Psychiatric: She has a normal mood and affect. Her behavior is normal. Judgment and thought content normal.   Nursing note and vitals reviewed.    Assessment/Plan      1. Nausea    2. Gastroesophageal reflux disease, esophagitis presence not specified    3. Dysphagia, unspecified type    4. Encounter for screening for malignant neoplasm of colon    .   Juliana was seen today for eval for colonoscopy.    Diagnoses and all orders for this visit:    Nausea  -     Case Request; Standing  -     Case Request    Gastroesophageal reflux disease, esophagitis presence not specified  -     Case Request; Standing  -     Case Request    Dysphagia, unspecified " type  -     Case Request; Standing  -     Case Request    Encounter for screening for malignant neoplasm of colon  -     polyethylene glycol (MIRALAX) powder; Take 17 g by mouth 1 (One) Time for 1 dose. Instructions per instruction sheet.  -     Case Request; Standing  -     sodium chloride 0.9 % flush 1-10 mL; Infuse 1-10 mL into a venous catheter As Needed for Line Care.  -     dextrose 5 % and sodium chloride 0.45 % infusion; Infuse 30 mL/hr into a venous catheter Continuous As Needed (Start Prior to Procedure).  -     Case Request    Other orders  -     Obtain Informed Consent; Standing  -     Verify Informed Consent; Standing  -     POC Glucose Fingerstick; Standing  -     Insert Peripheral IV; Standing  -     Saline Lock & Maintain IV Access; Standing        Orders placed during this encounter include:  No orders of the defined types were placed in this encounter.      Medications prescribed:  New Medications Ordered This Visit   Medications   • polyethylene glycol (MIRALAX) powder     Sig: Take 17 g by mouth 1 (One) Time for 1 dose. Instructions per instruction sheet.     Dispense:  250 g     Refill:  0     Discontinued Medications       Reason for Discontinue    diclofenac (VOLTAREN) 75 MG EC tablet Side effects        Requested Prescriptions     Signed Prescriptions Disp Refills   • polyethylene glycol (MIRALAX) powder 250 g 0     Sig: Take 17 g by mouth 1 (One) Time for 1 dose. Instructions per instruction sheet.       Review and/or summary of lab tests, radiology, procedures, medications. Review and summary of old records and obtaining of history. The risks and benefits of my recommendations, as well as other treatment options were discussed with the patient today. Questions were answered.    Follow-up: Return if symptoms worsen or fail to improve, for After the above.     ESOPHAGOGASTRODUODENOSCOPY dilation (N/A), COLONOSCOPY (N/A)      This document has been electronically signed by Jaun Knapp  DAYAN on March 30, 2017 5:52 PM      Results for orders placed or performed in visit on 01/30/17   Hepatitis C Antibody   Result Value Ref Range    HCV S/C Ratio 0.02 0.00 - 0.89    Hepatitis C Ab Negative Negative   Hemoglobin A1c   Result Value Ref Range    Hemoglobin A1C 6.32 (H) 4 - 5.6 %   Lipid Panel   Result Value Ref Range    Total Cholesterol 176 0 - 199 mg/dL    Triglycerides 349 (H) 20 - 199 mg/dL    HDL Cholesterol 32 (L) 60 - 200 mg/dL    LDL Cholesterol  93 1 - 129 mg/dL    LDL/HDL Ratio 2.32 0.00 - 3.22   Comprehensive Metabolic Panel   Result Value Ref Range    Glucose 105 (H) 60 - 100 mg/dL    BUN 13 7 - 21 mg/dL    Creatinine 0.71 0.50 - 1.00 mg/dL    Sodium 141 137 - 145 mmol/L    Potassium 4.7 3.5 - 5.1 mmol/L    Chloride 101 95 - 110 mmol/L    CO2 30.0 22.0 - 31.0 mmol/L    Calcium 9.6 8.4 - 10.2 mg/dL    Total Protein 7.1 6.3 - 8.6 g/dL    Albumin 4.30 3.40 - 4.80 g/dL    ALT (SGPT) 31 9 - 52 U/L    AST (SGOT) 21 14 - 36 U/L    Alkaline Phosphatase 65 38 - 126 U/L    Total Bilirubin 0.6 0.2 - 1.3 mg/dL    eGFR Non African Amer 81 39 - 90 mL/min/1.73    Globulin 2.8 2.3 - 3.5 gm/dL    A/G Ratio 1.5 1.1 - 1.8 g/dL    BUN/Creatinine Ratio 18.3 7.0 - 25.0    Anion Gap 10.0 5.0 - 15.0 mmol/L   Results for orders placed or performed in visit on 10/17/16    COLONOSCOPY   Result Value Ref Range     Colonoscopy        A single small sessile polyp was seen in the rectum which measured 1 mm.211.3 External hemorrhoids were present. 455.3    MAMMOGRAPHY   Result Value Ref Range     Mammogram Category 1-Negative     Results for orders placed or performed during the hospital encounter of 07/28/16   Hemoglobin A1c   Result Value Ref Range    Hemoglobin A1C 5.9 (H) 4.0 - 5.6 %TotHgb   Lipid panel   Result Value Ref Range    Total Cholesterol 162 0 - 199 mg/dl    Triglycerides 407 (H) 20 - 199 mg/dl    HDL Cholesterol 26 (L) 60 - 200 mg/dl   Comprehensive metabolic panel   Result Value Ref Range    Sodium  138 137 - 145 mmol/L    Potassium 4.7 3.5 - 5.1 mmol/L    Chloride 97 95 - 110 mmol/L    CO2 31 22 - 31 mmol/L    Anion Gap 10.0 5.0 - 15.0 mmol/L    Glucose 105 (H) 60 - 100 mg/dl    BUN 16 7 - 21 mg/dl    Creatinine 0.8 0.5 - 1.0 mg/dl    GFR MDRD Non  71 45 - 104 mL/min/1.73 sq.M    GFR MDRD  86 45 - 104 mL/min/1.73 sq.M    Calcium 9.8 8.4 - 10.2 mg/dl    Total Protein 7.3 6.3 - 8.6 gm/dl    Albumin 4.1 3.4 - 4.8 gm/dl    Total Bilirubin 0.7 0.2 - 1.3 mg/dl    Alkaline Phosphatase 63 38 - 126 U/L    AST (SGOT) 22 14 - 36 U/L    ALT (SGPT) 25 9 - 52 U/L   Results for orders placed or performed in visit on 07/28/15   Converted (Historical) Gyn Cytology   Result Value Ref Range    Spec Descr 1 SPECIMEN(S): Cervical/Endocervical     Clinical Information       CLINICAL HISTORY: Reason for Pap Smear: Routine Smear, Postmenopause    Specimen Adequacy         SPECIMEN ADEQUACY:  Satisfactory for evaluation:  endocervical/transformation zone component  absent.      Final Diagnosis         INTERPRETATION:  Negative for intraepithelial lesion or malignancy.      CONVERTED (HISTORICAL) FINAL PATHOLOGIST       Diagnostician:  MISSY MESA(ASCP)  Cytotechnologist  Electronically Signed 07/30/2015      ICD9 Diagnosis Code 1 ICD-9: V72.31     DISCLAIMER     Results for orders placed or performed in visit on 07/09/15   Hemoglobin A1c   Result Value Ref Range    Hemoglobin A1C 5.8 (H) 4.0 - 5.6 %Totb   Lipid panel   Result Value Ref Range    Total Cholesterol 183 0 - 199 mg/dl    Triglycerides 261 (H) 20 - 199 mg/dl    HDL Cholesterol 28 (L) 60 - 200 mg/dl    LDL Cholesterol  103 0 - 129 mg/dl   Comprehensive metabolic panel   Result Value Ref Range    Sodium 142 137 - 145 mmol/L    Potassium 4.7 3.5 - 5.1 mmol/L    Chloride 101 95 - 110 mmol/L    CO2 29 22 - 31 mmol/L    Anion Gap 12.0 5.0 - 15.0 mmol/L    Glucose 114 (H) 60 - 100 mg/dl    BUN 14 7 - 21 mg/dl    Creatinine 0.8 0.5 - 1.0  mg/dl    GFR MDRD Non  71 45 - 104 mL/min/1.73 sq.M    GFR MDRD  86 45 - 104 mL/min/1.73 sq.M    Calcium 9.5 8.4 - 10.2 mg/dl    Total Protein 7.1 6.3 - 8.6 gm/dl    Albumin 4.1 3.4 - 4.8 gm/dl    Total Bilirubin 0.6 0.2 - 1.3 mg/dl    Alkaline Phosphatase 66 38 - 126 U/L    AST (SGOT) 37 (H) 14 - 36 U/L    ALT (SGPT) 23 9 - 52 U/L   Results for orders placed or performed in visit on 02/28/12   Converted (Historical) Gyn Cytology   Result Value Ref Range    Spec Descr 1 SPECIMEN(S): Cervical/Endocervical     Clinical Information       CLINICAL HISTORY: Reason for Pap Smear: Routine Smear, Postmenopause    Specimen Adequacy         SPECIMEN ADEQUACY:  Satisfactory for evaluation:  endocervical/transformation zone component  present.      Final Diagnosis         INTERPRETATION:  Negative for intraepithelial lesion or malignancy.      CONVERTED (HISTORICAL) FINAL PATHOLOGIST       Diagnostician:  MISSY MESA(ASC)  Cytotechnologist  Electronically Signed 03/01/2012      ICD9 Diagnosis Code 1 ICD-9: V72.31     DISCLAIMER       The Pap smear is a screening test that aids in the detection of cervical  cancer and cancer precursors.  Both false positive and false negative  results can occur.  The test should be used at regular intervals, and positive results  should be confirmed before definitive therapy.     Results for orders placed or performed in visit on 11/02/10   Converted (Historical) Gyn Cytology   Result Value Ref Range    Spec Descr 1 SPECIMEN(S): Cervical/Endocervical     Clinical Information       CLINICAL HISTORY: Reason for Pap Smear: Routine Smear, Postmenopause    Specimen Adequacy         SPECIMEN ADEQUACY:  Satisfactory for evaluation:  endocervical/transformation zone component  present.      Final Diagnosis         INTERPRETATION:  Negative for intraepithelial lesion or malignancy.      CONVERTED (HISTORICAL) FINAL PATHOLOGIST       Diagnostician:  MISSY BRICEÑO  "CT(ASCP)  Cytotechnologist  Electronically Signed 11/04/2010      ICD9 Diagnosis Code 1 ICD-9: V72.31     DISCLAIMER       The Pap smear is a screening test that aids in the detection of cervical  cancer and cancer precursors.  Both false positive and false negative  results can occur.  The test should be used at regular intervals, and positive results  should be confirmed before definitive therapy.     Results for orders placed or performed in visit on 07/09/08   Converted Surgical Pathology   Result Value Ref Range    Spec Descr 1 SPECIMEN(S): A JOINT RESECTION, R KNEE     Clinical Information   CLINICAL HISTORY:  None Given       Gross Description         GROSS DESCRIPTION:  The specimen is labeled \"#2 bones & soft tissue l knee\" and consists of  fragments of bone and articular surfaces measuring 9.0 x 9.0 x 6.0 cm in  aggregate.  The articular cartilage is eroded, with eburnation of the  underlying bone.  Osteophyte formation is also present.  Also present in  the container are fragments of meniscus and other soft tissue measuring  5.5 x 6.0 x 2.5 cm.  Sections of soft tissue are submitted in one block.      Final Diagnosis         FINAL DIAGNOSIS:  LEFT KNEE, TOTAL ARTHROPLASTY:       CHRONIC DEGENERATIVE ARTHRITIS.    DIAGNOSIS CODE:  6      Comment   CLINICAL DIAGNOSIS:  OA, right knee       CONVERTED (HISTORICAL) FINAL PATHOLOGIST       Diagnostician:  KEVEN NOEL M.D.  Pathologist  Electronically Signed 07/11/2008     Results for orders placed or performed in visit on 04/16/07   Converted (Historical) Gyn Cytology   Result Value Ref Range    Spec Descr 1 SPECIMEN(S): Cervical/Endocervical     Clinical Information       CLINICAL HISTORY: Reason for Pap Smear: Routine Smear, Postmenopause    Comment         SPECIMEN ADEQUACY:  Satisfactory for evaluation:  endocervical/transformation zone component  present.    INTERPRETATION:  Negative for intraepithelial lesion or malignancy.      CONVERTED (HISTORICAL) " FINAL PATHOLOGIST       Diagnostician:  JOSE MESA([facility])  Cytotechnologist  Electronically Signed 04/18/2007      ICD9 Diagnosis Code 1 ICD-9: V72.31      *Note: Due to a large number of results and/or encounters for the requested time period, some results have not been displayed. A complete set of results can be found in Results Review.       Some portions of this note have been dictated using voice recognition software and may contain errors and/or omissions.

## 2017-03-31 ENCOUNTER — ANESTHESIA (OUTPATIENT)
Dept: GASTROENTEROLOGY | Facility: HOSPITAL | Age: 72
End: 2017-03-31

## 2017-03-31 ENCOUNTER — ANESTHESIA EVENT (OUTPATIENT)
Dept: GASTROENTEROLOGY | Facility: HOSPITAL | Age: 72
End: 2017-03-31

## 2017-03-31 ENCOUNTER — HOSPITAL ENCOUNTER (OUTPATIENT)
Facility: HOSPITAL | Age: 72
Setting detail: HOSPITAL OUTPATIENT SURGERY
Discharge: HOME | End: 2017-03-31
Attending: INTERNAL MEDICINE | Admitting: INTERNAL MEDICINE

## 2017-03-31 VITALS
OXYGEN SATURATION: 97 % | BODY MASS INDEX: 46.29 KG/M2 | TEMPERATURE: 97.4 F | DIASTOLIC BLOOD PRESSURE: 84 MMHG | HEIGHT: 64 IN | WEIGHT: 271.17 LBS | HEART RATE: 75 BPM | RESPIRATION RATE: 18 BRPM | SYSTOLIC BLOOD PRESSURE: 149 MMHG

## 2017-03-31 DIAGNOSIS — R11.0 NAUSEA: ICD-10-CM

## 2017-03-31 DIAGNOSIS — R13.10 DYSPHAGIA, UNSPECIFIED TYPE: ICD-10-CM

## 2017-03-31 DIAGNOSIS — K21.9 GASTROESOPHAGEAL REFLUX DISEASE, ESOPHAGITIS PRESENCE NOT SPECIFIED: ICD-10-CM

## 2017-03-31 DIAGNOSIS — Z12.11 ENCOUNTER FOR SCREENING FOR MALIGNANT NEOPLASM OF COLON: ICD-10-CM

## 2017-03-31 LAB — GLUCOSE BLDC GLUCOMTR-MCNC: 109 MG/DL (ref 70–130)

## 2017-03-31 PROCEDURE — 88342 IMHCHEM/IMCYTCHM 1ST ANTB: CPT | Performed by: INTERNAL MEDICINE

## 2017-03-31 PROCEDURE — 88305 TISSUE EXAM BY PATHOLOGIST: CPT | Performed by: INTERNAL MEDICINE

## 2017-03-31 PROCEDURE — 25010000002 PROPOFOL 10 MG/ML EMULSION: Performed by: NURSE ANESTHETIST, CERTIFIED REGISTERED

## 2017-03-31 PROCEDURE — 82962 GLUCOSE BLOOD TEST: CPT

## 2017-03-31 PROCEDURE — 88342 IMHCHEM/IMCYTCHM 1ST ANTB: CPT | Performed by: PATHOLOGY

## 2017-03-31 PROCEDURE — 25010000002 MIDAZOLAM PER 1 MG: Performed by: NURSE ANESTHETIST, CERTIFIED REGISTERED

## 2017-03-31 PROCEDURE — 43239 EGD BIOPSY SINGLE/MULTIPLE: CPT | Performed by: INTERNAL MEDICINE

## 2017-03-31 PROCEDURE — G0121 COLON CA SCRN NOT HI RSK IND: HCPCS | Performed by: INTERNAL MEDICINE

## 2017-03-31 PROCEDURE — 88305 TISSUE EXAM BY PATHOLOGIST: CPT | Performed by: PATHOLOGY

## 2017-03-31 RX ORDER — LIDOCAINE HYDROCHLORIDE 10 MG/ML
INJECTION, SOLUTION INFILTRATION; PERINEURAL AS NEEDED
Status: DISCONTINUED | OUTPATIENT
Start: 2017-03-31 | End: 2017-03-31 | Stop reason: SURG

## 2017-03-31 RX ORDER — DEXAMETHASONE SODIUM PHOSPHATE 4 MG/ML
8 INJECTION, SOLUTION INTRA-ARTICULAR; INTRALESIONAL; INTRAMUSCULAR; INTRAVENOUS; SOFT TISSUE ONCE AS NEEDED
Status: DISCONTINUED | OUTPATIENT
Start: 2017-03-31 | End: 2017-03-31 | Stop reason: HOSPADM

## 2017-03-31 RX ORDER — KETAMINE HYDROCHLORIDE 100 MG/ML
INJECTION INTRAMUSCULAR; INTRAVENOUS AS NEEDED
Status: DISCONTINUED | OUTPATIENT
Start: 2017-03-31 | End: 2017-03-31 | Stop reason: SURG

## 2017-03-31 RX ORDER — MIDAZOLAM HYDROCHLORIDE 1 MG/ML
INJECTION INTRAMUSCULAR; INTRAVENOUS AS NEEDED
Status: DISCONTINUED | OUTPATIENT
Start: 2017-03-31 | End: 2017-03-31 | Stop reason: SURG

## 2017-03-31 RX ORDER — ONDANSETRON 2 MG/ML
4 INJECTION INTRAMUSCULAR; INTRAVENOUS ONCE AS NEEDED
Status: DISCONTINUED | OUTPATIENT
Start: 2017-03-31 | End: 2017-03-31 | Stop reason: HOSPADM

## 2017-03-31 RX ORDER — PROPOFOL 10 MG/ML
VIAL (ML) INTRAVENOUS AS NEEDED
Status: DISCONTINUED | OUTPATIENT
Start: 2017-03-31 | End: 2017-03-31 | Stop reason: SURG

## 2017-03-31 RX ORDER — DEXTROSE AND SODIUM CHLORIDE 5; .45 G/100ML; G/100ML
30 INJECTION, SOLUTION INTRAVENOUS CONTINUOUS PRN
Status: DISCONTINUED | OUTPATIENT
Start: 2017-03-31 | End: 2017-03-31 | Stop reason: HOSPADM

## 2017-03-31 RX ADMIN — PROPOFOL 50 MG: 10 INJECTION, EMULSION INTRAVENOUS at 13:39

## 2017-03-31 RX ADMIN — KETAMINE HYDROCHLORIDE 60 MG: 100 INJECTION INTRAMUSCULAR; INTRAVENOUS at 13:28

## 2017-03-31 RX ADMIN — DEXTROSE AND SODIUM CHLORIDE 30 ML/HR: 5; 450 INJECTION, SOLUTION INTRAVENOUS at 13:24

## 2017-03-31 RX ADMIN — LIDOCAINE HYDROCHLORIDE 100 MG: 10 INJECTION, SOLUTION INFILTRATION; PERINEURAL at 13:28

## 2017-03-31 RX ADMIN — PROPOFOL 50 MG: 10 INJECTION, EMULSION INTRAVENOUS at 13:32

## 2017-03-31 RX ADMIN — MIDAZOLAM 2 MG: 1 INJECTION INTRAMUSCULAR; INTRAVENOUS at 13:28

## 2017-03-31 NOTE — ANESTHESIA POSTPROCEDURE EVALUATION
Patient: Juliana Alcazar    Procedure Summary     Date Anesthesia Start Anesthesia Stop Room / Location    03/31/17 1327 1349 [facility] ENDOSCOPY 1 / [facility] ENDOSCOPY       Procedure Diagnosis Surgeon Provider    ESOPHAGOGASTRODUODENOSCOPY dilation (N/A Esophagus); COLONOSCOPY (N/A ) Nausea; Encounter for screening for malignant neoplasm of colon; Gastroesophageal reflux disease, esophagitis presence not specified; Dysphagia, unspecified type  (Nausea [R11.0]; Encounter for screening for malignant neoplasm of colon [Z12.11]; Gastroesophageal reflux disease, esophagitis presence not specified [K21.9]; Dysphagia, unspecified type [R13.10]) MD Madie King CRNA          Anesthesia Type: MAC  Last vitals  /61 (03/31/17 1314)    Temp 97.2 °F (36.2 °C) (03/31/17 1314)    Pulse 81 (03/31/17 1314)   Resp 18 (03/31/17 1314)    SpO2 95 % (03/31/17 1314)      Post Anesthesia Care and Evaluation    Patient location during evaluation: bedside  Patient participation: complete - patient participated  Level of consciousness: awake and awake and alert  Pain management: adequate  Airway patency: patent  Anesthetic complications: No anesthetic complications  PONV Status: none  Cardiovascular status: acceptable  Respiratory status: acceptable  Hydration status: acceptable

## 2017-03-31 NOTE — ANESTHESIA PREPROCEDURE EVALUATION
Anesthesia Evaluation     Patient summary reviewed and Nursing notes reviewed      Airway   Mallampati: II  TM distance: <3 FB  Neck ROM: full  possible difficult intubation  Dental - normal exam     Pulmonary - normal exam   (+) shortness of breath (With exertion, METS < 4, denies CP with exertion, not worsening), sleep apnea on CPAP,   Cardiovascular - normal exam    (+) hypertension, dysrhythmias (Hx of afib, converted to NSR) Atrial Fib,       Neuro/Psych  (+) numbness, psychiatric history Depression,    GI/Hepatic/Renal/Endo    (+) morbid obesity, diabetes mellitus type 2,     Musculoskeletal     (+) myalgias,   Abdominal   (+) obese,    Substance History      OB/GYN          Other   (+) arthritis                                 Anesthesia Plan    ASA 3     MAC     Anesthetic plan and risks discussed with patient.

## 2017-04-05 LAB
LAB AP CASE REPORT: NORMAL
LAB AP DIAGNOSIS COMMENT: NORMAL
Lab: NORMAL
PATH REPORT.FINAL DX SPEC: NORMAL
PATH REPORT.GROSS SPEC: NORMAL

## 2017-06-09 ENCOUNTER — OFFICE VISIT (OUTPATIENT)
Dept: FAMILY MEDICINE CLINIC | Facility: CLINIC | Age: 72
End: 2017-06-09

## 2017-06-09 VITALS
BODY MASS INDEX: 46.26 KG/M2 | TEMPERATURE: 98.7 F | SYSTOLIC BLOOD PRESSURE: 145 MMHG | HEIGHT: 64 IN | OXYGEN SATURATION: 97 % | RESPIRATION RATE: 18 BRPM | WEIGHT: 271 LBS | DIASTOLIC BLOOD PRESSURE: 89 MMHG | HEART RATE: 70 BPM

## 2017-06-09 DIAGNOSIS — G89.29 CHRONIC LEFT-SIDED LOW BACK PAIN WITH LEFT-SIDED SCIATICA: Primary | ICD-10-CM

## 2017-06-09 DIAGNOSIS — M54.42 CHRONIC LEFT-SIDED LOW BACK PAIN WITH LEFT-SIDED SCIATICA: Primary | ICD-10-CM

## 2017-06-09 PROCEDURE — 99214 OFFICE O/P EST MOD 30 MIN: CPT | Performed by: NURSE PRACTITIONER

## 2017-06-09 RX ORDER — TRAMADOL HYDROCHLORIDE 50 MG/1
50 TABLET ORAL EVERY 6 HOURS PRN
COMMUNITY
End: 2017-06-09

## 2017-06-09 RX ORDER — TIZANIDINE 4 MG/1
4 TABLET ORAL EVERY 8 HOURS PRN
Qty: 90 TABLET | Refills: 5 | Status: SHIPPED | OUTPATIENT
Start: 2017-06-09 | End: 2017-07-31 | Stop reason: SDUPTHER

## 2017-06-09 RX ORDER — GABAPENTIN 300 MG/1
300 CAPSULE ORAL 3 TIMES DAILY
Qty: 60 CAPSULE | Refills: 0 | Status: SHIPPED | OUTPATIENT
Start: 2017-06-09 | End: 2017-06-20 | Stop reason: SINTOL

## 2017-06-09 NOTE — PROGRESS NOTES
Subjective   Juliana Alcazar is a 71 y.o. female.     HPI Comments: Here today for nissa.  Does have sciatic nerve pain on the left.  Has seen the ortho and discussed her case with a neurosurgeon who suggested she go to pain management.  Most pain managements are scheduled out by about 4 months.  She c/o severe pain now.  She has had mri done at [facility] and i don't have a copy of the report.      She says her b/s is running under 110 most of the time.    Hypertension   This is a chronic problem. The current episode started more than 1 year ago. The problem has been waxing and waning since onset. The problem is controlled (will elevate with pain.). Pertinent negatives include no anxiety, blurred vision, chest pain, headaches, malaise/fatigue, neck pain, orthopnea, palpitations, peripheral edema, PND, shortness of breath or sweats. There are no associated agents to hypertension. Risk factors for coronary artery disease include obesity, stress, diabetes mellitus and dyslipidemia. Past treatments include ACE inhibitors, diuretics and beta blockers. The current treatment provides significant improvement. Compliance problems include exercise.  Hypertensive end-organ damage includes CAD/MI. There is no history of angina, kidney disease, CVA, heart failure, left ventricular hypertrophy, PVD or retinopathy.   Diabetes   She presents for her follow-up diabetic visit. She has type 2 diabetes mellitus. Her disease course has been stable. There are no hypoglycemic associated symptoms. Pertinent negatives for hypoglycemia include no headaches or sweats. Pertinent negatives for diabetes include no blurred vision, no chest pain, no fatigue, no foot paresthesias, no foot ulcerations, no polydipsia, no polyphagia, no polyuria, no visual change, no weakness and no weight loss. There are no hypoglycemic complications. Symptoms are stable. There are no diabetic complications. Pertinent negatives for diabetic  complications include no CVA, PVD or retinopathy. Risk factors for coronary artery disease include diabetes mellitus, dyslipidemia, family history, hypertension, obesity, sedentary lifestyle, stress and post-menopausal. Current diabetic treatment includes oral agent (monotherapy). She is compliant with treatment all of the time. Her weight is stable. She is following a diabetic diet. When asked about meal planning, she reported none. She has had a previous visit with a dietitian. She never participates in exercise. She monitors blood glucose at home 1-2 x per day. Her breakfast blood glucose range is generally  mg/dl. An ACE inhibitor/angiotensin II receptor blocker is being taken. She does not see a podiatrist.Eye exam is current.   Back Pain   This is a chronic problem. The current episode started more than 1 year ago. The problem occurs constantly. The problem has been gradually worsening since onset. The pain is present in the lumbar spine. The pain radiates to the left thigh and left knee. The pain is at a severity of 10/10. The pain is severe. The pain is the same all the time. The symptoms are aggravated by standing and position. Stiffness is present in the morning. Pertinent negatives include no abdominal pain, bladder incontinence, bowel incontinence, chest pain, dysuria, fever, headaches, leg pain, numbness, paresis, paresthesias, pelvic pain, perianal numbness, tingling, weakness or weight loss. Risk factors include sedentary lifestyle, obesity, menopause and lack of exercise. She has tried muscle relaxant (tramadol) for the symptoms. The treatment provided no relief.        The following portions of the patient's history were reviewed and updated as appropriate: allergies, current medications, past family history, past medical history, past social history, past surgical history and problem list.    Review of Systems   Constitutional: Negative.  Negative for fatigue, fever, malaise/fatigue and  weight loss.   HENT: Negative.    Eyes: Negative for blurred vision.   Respiratory: Negative.  Negative for shortness of breath.    Cardiovascular: Negative.  Negative for chest pain, palpitations, orthopnea and PND.   Gastrointestinal: Negative for abdominal pain and bowel incontinence.   Endocrine: Negative for polydipsia, polyphagia and polyuria.   Genitourinary: Negative for bladder incontinence, dysuria and pelvic pain.   Musculoskeletal: Positive for back pain. Negative for neck pain.   Skin: Negative.    Neurological: Negative.  Negative for tingling, weakness, numbness, headaches and paresthesias.   Psychiatric/Behavioral: Negative.        Objective   Physical Exam   Constitutional: She is oriented to person, place, and time. She appears well-developed and well-nourished. No distress.   HENT:   Head: Normocephalic.   Eyes: Pupils are equal, round, and reactive to light.   Neck: Normal range of motion. No thyromegaly present.   Cardiovascular: Normal rate, regular rhythm and normal heart sounds.  Exam reveals no friction rub.    No murmur heard.  Pulmonary/Chest: Effort normal and breath sounds normal. No respiratory distress. She has no wheezes. She has no rales.   Abdominal: Soft.   Musculoskeletal:        Lumbar back: She exhibits decreased range of motion, pain and spasm.   Hs tenderness left sciatic nerve area.   Neurological: She is alert and oriented to person, place, and time.   Skin: Skin is warm and dry.   Psychiatric: She has a normal mood and affect. Thought content normal.   Nursing note and vitals reviewed.      Assessment/Plan   Juliana was seen today for hypertension, diabetes and back pain.    Diagnoses and all orders for this visit:    Chronic left-sided low back pain with left-sided sciatica  -     tiZANidine (ZANAFLEX) 4 MG tablet; Take 1 tablet by mouth Every 8 (Eight) Hours As Needed for Muscle Spasms.  -     gabapentin (NEURONTIN) 300 MG capsule; Take 1 capsule by mouth 3 (Three) Times a  Day.  -     Ambulatory Referral to Pain Management

## 2017-06-20 ENCOUNTER — OFFICE VISIT (OUTPATIENT)
Dept: GASTROENTEROLOGY | Facility: CLINIC | Age: 72
End: 2017-06-20

## 2017-06-20 VITALS
BODY MASS INDEX: 45.93 KG/M2 | DIASTOLIC BLOOD PRESSURE: 84 MMHG | WEIGHT: 269 LBS | SYSTOLIC BLOOD PRESSURE: 136 MMHG | HEIGHT: 64 IN | HEART RATE: 90 BPM

## 2017-06-20 DIAGNOSIS — R11.2 NON-INTRACTABLE VOMITING WITH NAUSEA, UNSPECIFIED VOMITING TYPE: Primary | ICD-10-CM

## 2017-06-20 DIAGNOSIS — K21.9 GASTROESOPHAGEAL REFLUX DISEASE WITHOUT ESOPHAGITIS: ICD-10-CM

## 2017-06-20 DIAGNOSIS — K57.30 DIVERTICULOSIS OF LARGE INTESTINE WITHOUT HEMORRHAGE: ICD-10-CM

## 2017-06-20 DIAGNOSIS — R10.13 EPIGASTRIC PAIN: ICD-10-CM

## 2017-06-20 PROCEDURE — 99212 OFFICE O/P EST SF 10 MIN: CPT | Performed by: PHYSICIAN ASSISTANT

## 2017-06-23 ENCOUNTER — OFFICE VISIT (OUTPATIENT)
Dept: FAMILY MEDICINE CLINIC | Facility: CLINIC | Age: 72
End: 2017-06-23

## 2017-06-23 VITALS
BODY MASS INDEX: 46.61 KG/M2 | SYSTOLIC BLOOD PRESSURE: 168 MMHG | TEMPERATURE: 97.6 F | HEART RATE: 78 BPM | WEIGHT: 273 LBS | DIASTOLIC BLOOD PRESSURE: 83 MMHG | RESPIRATION RATE: 18 BRPM | OXYGEN SATURATION: 98 % | HEIGHT: 64 IN

## 2017-06-23 DIAGNOSIS — G89.29 CHRONIC LEFT-SIDED LOW BACK PAIN WITHOUT SCIATICA: Primary | ICD-10-CM

## 2017-06-23 DIAGNOSIS — M54.50 CHRONIC LEFT-SIDED LOW BACK PAIN WITHOUT SCIATICA: Primary | ICD-10-CM

## 2017-06-23 PROCEDURE — 99213 OFFICE O/P EST LOW 20 MIN: CPT | Performed by: NURSE PRACTITIONER

## 2017-06-23 RX ORDER — GABAPENTIN 300 MG/1
300 CAPSULE ORAL NIGHTLY
COMMUNITY
End: 2017-07-31 | Stop reason: SDUPTHER

## 2017-06-23 NOTE — PROGRESS NOTES
Subjective   Juliana Alcazar is a 71 y.o. female.     HPI Comments: Here today for nissa.  She will be seeing both pain management and neurosurgeon next month.  Then it will be decided if she needs surgery or not.  She says the neurontin makes her swell so she only took it for 3 days and stopped the med.  She is still taking the zanaflex.  She thinks she has left over tramadol at home she is not taking.    Back Pain   This is a chronic problem. The current episode started more than 1 year ago. The problem occurs constantly. The problem has been waxing and waning since onset. The pain is present in the lumbar spine. The pain radiates to the left thigh. The pain is at a severity of 7/10. The patient is experiencing no pain. The pain is the same all the time. The symptoms are aggravated by standing. Stiffness is present in the morning. Pertinent negatives include no abdominal pain, bladder incontinence, bowel incontinence, chest pain, dysuria, fever, headaches, leg pain, numbness, paresis, paresthesias, pelvic pain, perianal numbness, tingling, weakness or weight loss. Risk factors include menopause and obesity. She has tried analgesics, home exercises, heat, ice and muscle relaxant for the symptoms. The treatment provided mild relief.        The following portions of the patient's history were reviewed and updated as appropriate: allergies, current medications, past family history, past medical history, past social history, past surgical history and problem list.    Review of Systems   Constitutional: Negative.  Negative for fever and weight loss.   HENT: Negative.    Respiratory: Negative.    Cardiovascular: Negative.  Negative for chest pain.   Gastrointestinal: Negative for abdominal pain and bowel incontinence.   Genitourinary: Negative for bladder incontinence, dysuria and pelvic pain.   Musculoskeletal: Positive for back pain.   Skin: Negative.    Neurological: Negative.  Negative for tingling, weakness,  numbness, headaches and paresthesias.   Psychiatric/Behavioral: Negative.        Objective   Physical Exam   Constitutional: She is oriented to person, place, and time. She appears well-developed and well-nourished. No distress.   HENT:   Head: Normocephalic.   Eyes: Pupils are equal, round, and reactive to light.   Neck: Normal range of motion. No thyromegaly present.   Cardiovascular: Normal rate, regular rhythm and normal heart sounds.  Exam reveals no friction rub.    No murmur heard.  Pulmonary/Chest: Effort normal and breath sounds normal. No respiratory distress. She has no wheezes. She has no rales.   Abdominal: Soft.   Musculoskeletal:        Lumbar back: She exhibits decreased range of motion, pain and spasm.        Left upper leg: She exhibits tenderness.   Neurological: She is alert and oriented to person, place, and time.   Skin: Skin is warm and dry.   Psychiatric: She has a normal mood and affect. Thought content normal.   Nursing note and vitals reviewed.      Assessment/Plan   Juliana was seen today for back pain.    Diagnoses and all orders for this visit:    Chronic left-sided low back pain without sciatica    She will try taking the gabapentin only at bedtime.  She thinks she has left over tramadol which she can try.  She will see both pain management and neurosurgeon next month.

## 2017-07-31 ENCOUNTER — OFFICE VISIT (OUTPATIENT)
Dept: FAMILY MEDICINE CLINIC | Facility: CLINIC | Age: 72
End: 2017-07-31

## 2017-07-31 VITALS
TEMPERATURE: 98 F | OXYGEN SATURATION: 96 % | SYSTOLIC BLOOD PRESSURE: 144 MMHG | WEIGHT: 273 LBS | RESPIRATION RATE: 16 BRPM | HEIGHT: 64 IN | BODY MASS INDEX: 46.61 KG/M2 | DIASTOLIC BLOOD PRESSURE: 63 MMHG | HEART RATE: 72 BPM

## 2017-07-31 DIAGNOSIS — M54.42 CHRONIC LEFT-SIDED LOW BACK PAIN WITH LEFT-SIDED SCIATICA: ICD-10-CM

## 2017-07-31 DIAGNOSIS — G89.29 CHRONIC LEFT-SIDED LOW BACK PAIN WITH LEFT-SIDED SCIATICA: ICD-10-CM

## 2017-07-31 DIAGNOSIS — G89.29 CHRONIC LEFT-SIDED LOW BACK PAIN WITHOUT SCIATICA: Primary | ICD-10-CM

## 2017-07-31 DIAGNOSIS — M54.50 CHRONIC LEFT-SIDED LOW BACK PAIN WITHOUT SCIATICA: Primary | ICD-10-CM

## 2017-07-31 DIAGNOSIS — M25.551 ARTHRALGIA OF RIGHT HIP: ICD-10-CM

## 2017-07-31 PROCEDURE — 99214 OFFICE O/P EST MOD 30 MIN: CPT | Performed by: NURSE PRACTITIONER

## 2017-07-31 RX ORDER — TIZANIDINE 2 MG/1
TABLET ORAL
COMMUNITY
Start: 2017-06-26 | End: 2017-08-31 | Stop reason: SDUPTHER

## 2017-07-31 RX ORDER — TIZANIDINE 4 MG/1
4 TABLET ORAL EVERY 8 HOURS PRN
Qty: 90 TABLET | Refills: 5 | Status: SHIPPED | OUTPATIENT
Start: 2017-07-31 | End: 2018-03-29 | Stop reason: SDUPTHER

## 2017-07-31 RX ORDER — DICLOFENAC SODIUM 75 MG/1
75 TABLET, DELAYED RELEASE ORAL 3 TIMES DAILY
COMMUNITY
Start: 2017-06-26 | End: 2017-07-31 | Stop reason: SDUPTHER

## 2017-07-31 RX ORDER — DICLOFENAC SODIUM 75 MG/1
75 TABLET, DELAYED RELEASE ORAL 2 TIMES DAILY
Qty: 60 TABLET | Refills: 5 | Status: SHIPPED | OUTPATIENT
Start: 2017-07-31 | End: 2018-03-29 | Stop reason: SDUPTHER

## 2017-07-31 RX ORDER — HYDROCODONE BITARTRATE AND ACETAMINOPHEN 5; 325 MG/1; MG/1
TABLET ORAL
COMMUNITY
Start: 2017-07-28 | End: 2017-08-31 | Stop reason: SDUPTHER

## 2017-07-31 RX ORDER — GABAPENTIN 300 MG/1
300 CAPSULE ORAL 2 TIMES DAILY
Qty: 60 CAPSULE | Refills: 0 | Status: SHIPPED | OUTPATIENT
Start: 2017-07-31 | End: 2017-08-31 | Stop reason: SDUPTHER

## 2017-07-31 NOTE — PROGRESS NOTES
Subjective   Juliana Alcazar is a 71 y.o. female.     HPI Comments: Here today for nissa on her back pain.  She has seen the pain management spec and he has placed her on norco.  She says it doesn't seem to help.  He gets better results from the gabapentin, nsaid and muscle relaxer.  She reported that she thought 3 gabapentin a day caused swelling.  However she has been able to take 1 a day without diff and she thinks she can increase to 2 a day without diff.    Back Pain   This is a chronic problem. The current episode started more than 1 year ago. The problem occurs constantly. The problem has been waxing and waning since onset. The pain is present in the lumbar spine. The quality of the pain is described as aching, stabbing and cramping. The pain radiates to the left thigh. The pain is at a severity of 8/10. The pain is severe. The pain is the same all the time. The symptoms are aggravated by bending, standing, sitting and lying down. Stiffness is present in the morning. Associated symptoms include leg pain. Pertinent negatives include no abdominal pain, bladder incontinence, bowel incontinence, chest pain, dysuria, fever, headaches, numbness, paresis, paresthesias, pelvic pain, perianal numbness, tingling, weakness or weight loss. Risk factors include menopause, obesity and sedentary lifestyle. She has tried analgesics, muscle relaxant and NSAIDs for the symptoms. The treatment provided mild relief.        The following portions of the patient's history were reviewed and updated as appropriate: allergies, current medications, past family history, past medical history, past social history, past surgical history and problem list.    Review of Systems   Constitutional: Negative.  Negative for fever and weight loss.   HENT: Negative.    Respiratory: Negative.    Cardiovascular: Negative.  Negative for chest pain.   Gastrointestinal: Negative for abdominal pain and bowel incontinence.   Genitourinary: Negative for  bladder incontinence, dysuria and pelvic pain.   Musculoskeletal: Positive for arthralgias and back pain.   Skin: Negative.    Neurological: Negative.  Negative for tingling, weakness, numbness, headaches and paresthesias.   Psychiatric/Behavioral: Negative.        Objective   Physical Exam   Constitutional: She is oriented to person, place, and time. She appears well-developed and well-nourished. She appears distressed.   HENT:   Head: Normocephalic.   Neck: Normal range of motion. Neck supple. No thyromegaly present.   Cardiovascular: Normal rate, regular rhythm and normal heart sounds.  Exam reveals no friction rub.    No murmur heard.  Pulmonary/Chest: Effort normal and breath sounds normal. No respiratory distress. She has no wheezes. She has no rales.   Abdominal: Soft.   Musculoskeletal:        Lumbar back: She exhibits decreased range of motion, pain and spasm.        Left upper leg: She exhibits tenderness.   Neurological: She is alert and oriented to person, place, and time.   Skin: Skin is warm and dry.   Psychiatric: She has a normal mood and affect. Thought content normal.   Nursing note and vitals reviewed.      Assessment/Plan   Juliana was seen today for back pain and leg pain.    Diagnoses and all orders for this visit:    Chronic left-sided low back pain without sciatica  -     gabapentin (NEURONTIN) 300 MG capsule; Take 1 capsule by mouth 2 (Two) Times a Day.  -     diclofenac (VOLTAREN) 75 MG EC tablet; Take 1 tablet by mouth 2 (Two) Times a Day.    Arthralgia of right hip  -     gabapentin (NEURONTIN) 300 MG capsule; Take 1 capsule by mouth 2 (Two) Times a Day.  -     diclofenac (VOLTAREN) 75 MG EC tablet; Take 1 tablet by mouth 2 (Two) Times a Day.    Chronic left-sided low back pain with left-sided sciatica  -     tiZANidine (ZANAFLEX) 4 MG tablet; Take 1 tablet by mouth Every 8 (Eight) Hours As Needed for Muscle Spasms.    she will cont to see pain management and she will cont with the above  meds including the gabapentin.  [facility] # [id number] is reviewed.

## 2017-08-31 ENCOUNTER — OFFICE VISIT (OUTPATIENT)
Dept: FAMILY MEDICINE CLINIC | Facility: CLINIC | Age: 72
End: 2017-08-31

## 2017-08-31 VITALS
DIASTOLIC BLOOD PRESSURE: 79 MMHG | BODY MASS INDEX: 46.61 KG/M2 | SYSTOLIC BLOOD PRESSURE: 143 MMHG | HEIGHT: 64 IN | RESPIRATION RATE: 14 BRPM | TEMPERATURE: 98.2 F | WEIGHT: 273 LBS | OXYGEN SATURATION: 98 % | HEART RATE: 60 BPM

## 2017-08-31 DIAGNOSIS — G89.29 CHRONIC LEFT-SIDED LOW BACK PAIN WITHOUT SCIATICA: Primary | ICD-10-CM

## 2017-08-31 DIAGNOSIS — M25.551 ARTHRALGIA OF RIGHT HIP: ICD-10-CM

## 2017-08-31 DIAGNOSIS — M54.50 CHRONIC LEFT-SIDED LOW BACK PAIN WITHOUT SCIATICA: Primary | ICD-10-CM

## 2017-08-31 PROCEDURE — 99214 OFFICE O/P EST MOD 30 MIN: CPT | Performed by: NURSE PRACTITIONER

## 2017-08-31 RX ORDER — GABAPENTIN 300 MG/1
300 CAPSULE ORAL 3 TIMES DAILY
Qty: 90 CAPSULE | Refills: 0 | Status: SHIPPED | OUTPATIENT
Start: 2017-08-31 | End: 2018-09-04

## 2017-08-31 RX ORDER — HYDROCODONE BITARTRATE AND ACETAMINOPHEN 7.5; 325 MG/1; MG/1
1 TABLET ORAL EVERY 6 HOURS PRN
COMMUNITY
End: 2018-09-04

## 2017-08-31 RX ORDER — HYDROCODONE BITARTRATE AND ACETAMINOPHEN 7.5; 325 MG/1; MG/1
7.5-325 TABLET ORAL EVERY 6 HOURS PRN
COMMUNITY
Start: 2017-08-26 | End: 2017-08-31 | Stop reason: SDUPTHER

## 2018-03-29 ENCOUNTER — OFFICE VISIT (OUTPATIENT)
Dept: FAMILY MEDICINE CLINIC | Facility: CLINIC | Age: 73
End: 2018-03-29

## 2018-03-29 VITALS
RESPIRATION RATE: 20 BRPM | DIASTOLIC BLOOD PRESSURE: 91 MMHG | HEIGHT: 64 IN | WEIGHT: 273 LBS | TEMPERATURE: 98.5 F | HEART RATE: 146 BPM | OXYGEN SATURATION: 97 % | BODY MASS INDEX: 46.61 KG/M2 | SYSTOLIC BLOOD PRESSURE: 124 MMHG

## 2018-03-29 DIAGNOSIS — E11.9 TYPE 2 DIABETES MELLITUS WITHOUT COMPLICATION, WITHOUT LONG-TERM CURRENT USE OF INSULIN (HCC): ICD-10-CM

## 2018-03-29 DIAGNOSIS — F32.A DEPRESSION, UNSPECIFIED DEPRESSION TYPE: ICD-10-CM

## 2018-03-29 DIAGNOSIS — M25.551 ARTHRALGIA OF RIGHT HIP: ICD-10-CM

## 2018-03-29 DIAGNOSIS — M54.50 CHRONIC LEFT-SIDED LOW BACK PAIN WITHOUT SCIATICA: ICD-10-CM

## 2018-03-29 DIAGNOSIS — E78.5 HYPERLIPIDEMIA, UNSPECIFIED HYPERLIPIDEMIA TYPE: ICD-10-CM

## 2018-03-29 DIAGNOSIS — G89.29 CHRONIC LEFT-SIDED LOW BACK PAIN WITHOUT SCIATICA: ICD-10-CM

## 2018-03-29 DIAGNOSIS — I10 ESSENTIAL HYPERTENSION: ICD-10-CM

## 2018-03-29 DIAGNOSIS — I48.91 ATRIAL FIBRILLATION, UNSPECIFIED TYPE (HCC): Primary | ICD-10-CM

## 2018-03-29 DIAGNOSIS — R00.0 TACHYCARDIA: ICD-10-CM

## 2018-03-29 DIAGNOSIS — G89.29 CHRONIC LEFT-SIDED LOW BACK PAIN WITH LEFT-SIDED SCIATICA: ICD-10-CM

## 2018-03-29 DIAGNOSIS — M54.42 CHRONIC LEFT-SIDED LOW BACK PAIN WITH LEFT-SIDED SCIATICA: ICD-10-CM

## 2018-03-29 PROCEDURE — 99214 OFFICE O/P EST MOD 30 MIN: CPT | Performed by: NURSE PRACTITIONER

## 2018-03-29 RX ORDER — LISINOPRIL 20 MG/1
20 TABLET ORAL DAILY
Qty: 90 TABLET | Refills: 1 | Status: SHIPPED | OUTPATIENT
Start: 2018-03-29 | End: 2018-09-04 | Stop reason: SDUPTHER

## 2018-03-29 RX ORDER — FUROSEMIDE 20 MG/1
20 TABLET ORAL DAILY
Qty: 30 TABLET | Refills: 1 | Status: SHIPPED | OUTPATIENT
Start: 2018-03-29 | End: 2018-03-29 | Stop reason: SDUPTHER

## 2018-03-29 RX ORDER — FUROSEMIDE 20 MG/1
20 TABLET ORAL DAILY
Qty: 90 TABLET | Refills: 1 | Status: SHIPPED | OUTPATIENT
Start: 2018-03-29 | End: 2018-09-04 | Stop reason: SDUPTHER

## 2018-03-29 RX ORDER — METOPROLOL TARTRATE 50 MG/1
50 TABLET, FILM COATED ORAL EVERY 12 HOURS SCHEDULED
Qty: 180 TABLET | Refills: 1 | Status: SHIPPED | OUTPATIENT
Start: 2018-03-29 | End: 2018-05-19 | Stop reason: SDUPTHER

## 2018-03-29 RX ORDER — TIZANIDINE 4 MG/1
4 TABLET ORAL EVERY 8 HOURS PRN
Qty: 90 TABLET | Refills: 5 | Status: SHIPPED | OUTPATIENT
Start: 2018-03-29 | End: 2018-09-04 | Stop reason: SDUPTHER

## 2018-03-29 RX ORDER — FENOFIBRATE 54 MG/1
54 TABLET ORAL DAILY
Qty: 90 TABLET | Refills: 1 | Status: SHIPPED | OUTPATIENT
Start: 2018-03-29 | End: 2018-09-04 | Stop reason: SDUPTHER

## 2018-03-29 RX ORDER — SERTRALINE HYDROCHLORIDE 100 MG/1
TABLET, FILM COATED ORAL
Qty: 135 TABLET | Refills: 1 | Status: SHIPPED | OUTPATIENT
Start: 2018-03-29 | End: 2018-05-19 | Stop reason: SDUPTHER

## 2018-03-29 RX ORDER — DICLOFENAC SODIUM 75 MG/1
75 TABLET, DELAYED RELEASE ORAL 2 TIMES DAILY
Qty: 180 TABLET | Refills: 1 | Status: SHIPPED | OUTPATIENT
Start: 2018-03-29 | End: 2018-09-04 | Stop reason: SDUPTHER

## 2018-03-29 RX ORDER — ATORVASTATIN CALCIUM 40 MG/1
40 TABLET, FILM COATED ORAL NIGHTLY
Qty: 90 TABLET | Refills: 1 | Status: SHIPPED | OUTPATIENT
Start: 2018-03-29 | End: 2018-05-19 | Stop reason: SDUPTHER

## 2018-03-29 NOTE — PROGRESS NOTES
Subjective   Juliana Alcazar is a 72 y.o. female.     Here today with cough, but says that since this am her heart rate has been up.  She has a hx of atrial fib, but has until now been fairly well controlled.      Cough   This is a new problem. The current episode started in the past 7 days. The problem has been waxing and waning. The problem occurs every few minutes. The cough is non-productive. Associated symptoms include a fever (low grade). Pertinent negatives include no chest pain, chills, ear congestion, ear pain, headaches, heartburn, hemoptysis, myalgias, nasal congestion, postnasal drip, rash, rhinorrhea, sore throat, shortness of breath, sweats, weight loss or wheezing. Nothing aggravates the symptoms. She has tried nothing for the symptoms. The treatment provided no relief.        The following portions of the patient's history were reviewed and updated as appropriate: allergies, current medications, past family history, past medical history, past social history, past surgical history and problem list.    Review of Systems   Constitutional: Positive for fever (low grade). Negative for chills and unexpected weight loss.   HENT: Negative.  Negative for ear pain, postnasal drip, rhinorrhea and sore throat.    Respiratory: Positive for cough. Negative for hemoptysis, shortness of breath and wheezing.    Cardiovascular: Negative.  Negative for chest pain.   Musculoskeletal: Negative.  Negative for myalgias.   Skin: Negative.  Negative for rash.   Neurological: Negative.  Negative for headaches.   Psychiatric/Behavioral: Negative.        Objective   Physical Exam   Constitutional: She is oriented to person, place, and time. She appears well-developed and well-nourished. No distress.   HENT:   Head: Normocephalic.   Right Ear: External ear normal.   Left Ear: External ear normal.   Nose: Nose normal.   Mouth/Throat: Oropharynx is clear and moist. No oropharyngeal exudate.   Eyes: Pupils are equal, round, and  reactive to light.   Neck: Normal range of motion.   Cardiovascular: Normal rate.  Exam reveals no friction rub.    No murmur heard.  ekg shows atrial fib with rapid ventricular response.  Left bundle branch block.  Vent rate 124.    Heart sounds are irregular.   Pulmonary/Chest: Effort normal and breath sounds normal. No respiratory distress. She has no wheezes. She has no rales.   Abdominal: Soft.   Musculoskeletal: Normal range of motion.   Neurological: She is alert and oriented to person, place, and time.   Skin: Skin is warm and dry.   Psychiatric: She has a normal mood and affect. Thought content normal.   Nursing note and vitals reviewed.        Assessment/Plan   Juliana was seen today for cough and nasal congestion.    Diagnoses and all orders for this visit:    Atrial fibrillation, unspecified type  -     rivaroxaban (XARELTO) 20 MG tablet; Take 1 tablet by mouth Daily.    Tachycardia  -     ECG 12 Lead    Essential hypertension  -     Discontinue: furosemide (LASIX) 20 MG tablet; Take 1 tablet by mouth Daily.  -     furosemide (LASIX) 20 MG tablet; Take 1 tablet by mouth Daily.  -     lisinopril (PRINIVIL,ZESTRIL) 20 MG tablet; Take 1 tablet by mouth Daily.  -     metoprolol tartrate (LOPRESSOR) 50 MG tablet; Take 1 tablet by mouth Every 12 (Twelve) Hours.    Hyperlipidemia, unspecified hyperlipidemia type  -     atorvastatin (LIPITOR) 40 MG tablet; Take 1 tablet by mouth Every Night.  -     fenofibrate (LOFIBRA) 54 MG tablet; Take 1 tablet by mouth Daily.    Arthralgia of right hip  -     diclofenac (VOLTAREN) 75 MG EC tablet; Take 1 tablet by mouth 2 (Two) Times a Day.    Chronic left-sided low back pain without sciatica  -     diclofenac (VOLTAREN) 75 MG EC tablet; Take 1 tablet by mouth 2 (Two) Times a Day.    Type 2 diabetes mellitus without complication, without long-term current use of insulin  -     metFORMIN (GLUCOPHAGE) 500 MG tablet; Take 1 tablet by mouth Daily With Breakfast.    Depression,  unspecified depression type  -     sertraline (ZOLOFT) 100 MG tablet; 1 tab in am and half a tab in pm    Chronic left-sided low back pain with left-sided sciatica  -     tiZANidine (ZANAFLEX) 4 MG tablet; Take 1 tablet by mouth Every 8 (Eight) Hours As Needed for Muscle Spasms.    she is given a refill on all maintenance meds.  She also is sent to the er at [facility].  She went pov.

## 2018-05-19 DIAGNOSIS — F32.A DEPRESSION, UNSPECIFIED DEPRESSION TYPE: ICD-10-CM

## 2018-05-19 DIAGNOSIS — I10 ESSENTIAL HYPERTENSION: ICD-10-CM

## 2018-05-19 DIAGNOSIS — E78.5 HYPERLIPIDEMIA, UNSPECIFIED HYPERLIPIDEMIA TYPE: ICD-10-CM

## 2018-05-19 DIAGNOSIS — E11.9 TYPE 2 DIABETES MELLITUS WITHOUT COMPLICATION, WITHOUT LONG-TERM CURRENT USE OF INSULIN (HCC): ICD-10-CM

## 2018-05-21 RX ORDER — FUROSEMIDE 20 MG/1
20 TABLET ORAL DAILY
Qty: 90 TABLET | Refills: 1 | Status: SHIPPED | OUTPATIENT
Start: 2018-05-21 | End: 2018-09-04 | Stop reason: SDUPTHER

## 2018-05-21 RX ORDER — LISINOPRIL 20 MG/1
20 TABLET ORAL DAILY
Qty: 90 TABLET | Refills: 1 | Status: SHIPPED | OUTPATIENT
Start: 2018-05-21 | End: 2018-09-04 | Stop reason: SDUPTHER

## 2018-05-21 RX ORDER — METOPROLOL TARTRATE 50 MG/1
TABLET, FILM COATED ORAL
Qty: 180 TABLET | Refills: 1 | Status: SHIPPED | OUTPATIENT
Start: 2018-05-21 | End: 2018-09-04 | Stop reason: SDUPTHER

## 2018-05-21 RX ORDER — ATORVASTATIN CALCIUM 40 MG/1
40 TABLET, FILM COATED ORAL NIGHTLY
Qty: 90 TABLET | Refills: 1 | Status: SHIPPED | OUTPATIENT
Start: 2018-05-21 | End: 2018-09-04 | Stop reason: SDUPTHER

## 2018-05-21 RX ORDER — SERTRALINE HYDROCHLORIDE 100 MG/1
TABLET, FILM COATED ORAL
Qty: 135 TABLET | Refills: 1 | Status: SHIPPED | OUTPATIENT
Start: 2018-05-21 | End: 2018-09-04 | Stop reason: SDUPTHER

## 2018-09-04 ENCOUNTER — OFFICE VISIT (OUTPATIENT)
Dept: FAMILY MEDICINE CLINIC | Facility: CLINIC | Age: 73
End: 2018-09-04

## 2018-09-04 VITALS
DIASTOLIC BLOOD PRESSURE: 77 MMHG | HEIGHT: 64 IN | BODY MASS INDEX: 46.26 KG/M2 | HEART RATE: 81 BPM | RESPIRATION RATE: 16 BRPM | TEMPERATURE: 97.8 F | WEIGHT: 271 LBS | OXYGEN SATURATION: 94 % | SYSTOLIC BLOOD PRESSURE: 132 MMHG

## 2018-09-04 DIAGNOSIS — E11.9 TYPE 2 DIABETES MELLITUS WITHOUT COMPLICATION, WITHOUT LONG-TERM CURRENT USE OF INSULIN (HCC): ICD-10-CM

## 2018-09-04 DIAGNOSIS — F32.A DEPRESSION, UNSPECIFIED DEPRESSION TYPE: ICD-10-CM

## 2018-09-04 DIAGNOSIS — E78.5 HYPERLIPIDEMIA, UNSPECIFIED HYPERLIPIDEMIA TYPE: ICD-10-CM

## 2018-09-04 DIAGNOSIS — M54.50 CHRONIC LEFT-SIDED LOW BACK PAIN WITHOUT SCIATICA: ICD-10-CM

## 2018-09-04 DIAGNOSIS — M25.551 ARTHRALGIA OF RIGHT HIP: ICD-10-CM

## 2018-09-04 DIAGNOSIS — G89.29 CHRONIC LEFT-SIDED LOW BACK PAIN WITHOUT SCIATICA: ICD-10-CM

## 2018-09-04 DIAGNOSIS — I48.91 ATRIAL FIBRILLATION, UNSPECIFIED TYPE (HCC): ICD-10-CM

## 2018-09-04 DIAGNOSIS — M54.42 CHRONIC LEFT-SIDED LOW BACK PAIN WITH LEFT-SIDED SCIATICA: ICD-10-CM

## 2018-09-04 DIAGNOSIS — B35.4 TINEA CORPORIS: Primary | ICD-10-CM

## 2018-09-04 DIAGNOSIS — I10 ESSENTIAL HYPERTENSION: ICD-10-CM

## 2018-09-04 DIAGNOSIS — G89.29 CHRONIC LEFT-SIDED LOW BACK PAIN WITH LEFT-SIDED SCIATICA: ICD-10-CM

## 2018-09-04 PROCEDURE — 99214 OFFICE O/P EST MOD 30 MIN: CPT | Performed by: NURSE PRACTITIONER

## 2018-09-04 RX ORDER — PRENATAL VIT 91/IRON/FOLIC/DHA 28-975-200
COMBINATION PACKAGE (EA) ORAL 2 TIMES DAILY
Qty: 42 G | Refills: 3 | Status: SHIPPED | OUTPATIENT
Start: 2018-09-04 | End: 2018-11-13

## 2018-09-04 RX ORDER — METOPROLOL TARTRATE 50 MG/1
50 TABLET, FILM COATED ORAL 2 TIMES DAILY
Qty: 180 TABLET | Refills: 1 | Status: ON HOLD | OUTPATIENT
Start: 2018-09-04 | End: 2018-09-10

## 2018-09-04 RX ORDER — FENOFIBRATE 54 MG/1
54 TABLET ORAL DAILY
Qty: 90 TABLET | Refills: 1 | Status: SHIPPED | OUTPATIENT
Start: 2018-09-04 | End: 2019-10-25 | Stop reason: SDUPTHER

## 2018-09-04 RX ORDER — FUROSEMIDE 20 MG/1
20 TABLET ORAL DAILY
Qty: 90 TABLET | Refills: 1 | Status: SHIPPED | OUTPATIENT
Start: 2018-09-04 | End: 2019-03-16 | Stop reason: SDUPTHER

## 2018-09-04 RX ORDER — TIZANIDINE 4 MG/1
4 TABLET ORAL EVERY 8 HOURS PRN
Qty: 90 TABLET | Refills: 5 | Status: SHIPPED | OUTPATIENT
Start: 2018-09-04 | End: 2018-11-13

## 2018-09-04 RX ORDER — FENOFIBRATE 54 MG/1
54 TABLET ORAL DAILY
Qty: 90 TABLET | Refills: 1 | Status: SHIPPED | OUTPATIENT
Start: 2018-09-04 | End: 2018-09-04 | Stop reason: SDUPTHER

## 2018-09-04 RX ORDER — LISINOPRIL 20 MG/1
20 TABLET ORAL DAILY
Qty: 90 TABLET | Refills: 1 | Status: SHIPPED | OUTPATIENT
Start: 2018-09-04 | End: 2019-10-25 | Stop reason: SDUPTHER

## 2018-09-04 RX ORDER — ATORVASTATIN CALCIUM 40 MG/1
40 TABLET, FILM COATED ORAL NIGHTLY
Qty: 90 TABLET | Refills: 1 | Status: SHIPPED | OUTPATIENT
Start: 2018-09-04 | End: 2019-03-16 | Stop reason: SDUPTHER

## 2018-09-04 RX ORDER — SERTRALINE HYDROCHLORIDE 100 MG/1
TABLET, FILM COATED ORAL
Qty: 135 TABLET | Refills: 1 | Status: SHIPPED | OUTPATIENT
Start: 2018-09-04 | End: 2019-10-25 | Stop reason: SDUPTHER

## 2018-09-04 RX ORDER — LISINOPRIL 20 MG/1
20 TABLET ORAL DAILY
Qty: 90 TABLET | Refills: 1 | Status: SHIPPED | OUTPATIENT
Start: 2018-09-04 | End: 2018-09-04 | Stop reason: SDUPTHER

## 2018-09-04 RX ORDER — DICLOFENAC SODIUM 75 MG/1
75 TABLET, DELAYED RELEASE ORAL 2 TIMES DAILY
Qty: 180 TABLET | Refills: 1 | Status: SHIPPED | OUTPATIENT
Start: 2018-09-04 | End: 2018-11-13

## 2018-09-04 RX ORDER — DICLOFENAC SODIUM 75 MG/1
75 TABLET, DELAYED RELEASE ORAL 2 TIMES DAILY
Qty: 180 TABLET | Refills: 1 | Status: SHIPPED | OUTPATIENT
Start: 2018-09-04 | End: 2018-09-04 | Stop reason: SDUPTHER

## 2018-09-04 NOTE — PROGRESS NOTES
Subjective   Juliana Alcazar is a 72 y.o. female.     Here today with an itchy rash on her abd. Has had for at least 2 months and is getting bigger.      Rash   This is a new problem. The current episode started more than 1 month ago. The problem has been gradually worsening since onset. The affected locations include the abdomen. The rash is characterized by redness, scaling and itchiness. She was exposed to nothing. Pertinent negatives include no anorexia, congestion, cough, diarrhea, eye pain, facial edema, fatigue, fever, joint pain, nail changes, rhinorrhea, shortness of breath, sore throat or vomiting. Past treatments include nothing. The treatment provided no relief.        The following portions of the patient's history were reviewed and updated as appropriate: allergies, current medications, past family history, past medical history, past social history, past surgical history and problem list.    Review of Systems   Constitutional: Negative.  Negative for fatigue and fever.   HENT: Negative.  Negative for congestion, rhinorrhea and sore throat.    Eyes: Negative for pain.   Respiratory: Negative.  Negative for cough and shortness of breath.    Cardiovascular: Negative.    Gastrointestinal: Negative for anorexia, diarrhea and vomiting.   Musculoskeletal: Negative.  Negative for joint pain.   Skin: Positive for rash. Negative for nail changes.   Neurological: Negative.    Psychiatric/Behavioral: Negative.        Objective   Physical Exam   Constitutional: She is oriented to person, place, and time. She appears well-developed and well-nourished. No distress.   HENT:   Head: Normocephalic.   Eyes: Pupils are equal, round, and reactive to light.   Neck: Normal range of motion. Neck supple. No thyromegaly present.   Cardiovascular: Normal rate, regular rhythm and normal heart sounds.  Exam reveals no friction rub.    No murmur heard.  Pulmonary/Chest: Effort normal and breath sounds normal. No respiratory  distress. She has no wheezes. She has no rales.   Abdominal: Soft.   Musculoskeletal: Normal range of motion.   Neurological: She is alert and oriented to person, place, and time.   Skin: Skin is warm and dry. Rash (large red rash mid abd that has serpentine edges and central clearing.  ) noted.   Psychiatric: She has a normal mood and affect. Thought content normal.   Nursing note and vitals reviewed.        Assessment/Plan   Juliana was seen today for place on stomach.    Diagnoses and all orders for this visit:    Tinea corporis  -     terbinafine (LAMISIL AT) 1 % cream; Apply  topically to the appropriate area as directed 2 (Two) Times a Day.    Hyperlipidemia, unspecified hyperlipidemia type  -     atorvastatin (LIPITOR) 40 MG tablet; Take 1 tablet by mouth Every Night.  -     Discontinue: fenofibrate (LOFIBRA) 54 MG tablet; Take 1 tablet by mouth Daily.  -     fenofibrate (LOFIBRA) 54 MG tablet; Take 1 tablet by mouth Daily.    Arthralgia of right hip  -     diclofenac (VOLTAREN) 75 MG EC tablet; Take 1 tablet by mouth 2 (Two) Times a Day.    Chronic left-sided low back pain without sciatica  -     diclofenac (VOLTAREN) 75 MG EC tablet; Take 1 tablet by mouth 2 (Two) Times a Day.    Essential hypertension  -     furosemide (LASIX) 20 MG tablet; Take 1 tablet by mouth Daily.  -     Discontinue: lisinopril (PRINIVIL,ZESTRIL) 20 MG tablet; Take 1 tablet by mouth Daily.  -     metoprolol tartrate (LOPRESSOR) 50 MG tablet; Take 1 tablet by mouth 2 (Two) Times a Day.  -     lisinopril (PRINIVIL,ZESTRIL) 20 MG tablet; Take 1 tablet by mouth Daily.    Type 2 diabetes mellitus without complication, without long-term current use of insulin (CMS/HCC)  -     Discontinue: metFORMIN (GLUCOPHAGE) 500 MG tablet; Take 1 tablet by mouth Daily With Breakfast.  -     metFORMIN (GLUCOPHAGE) 500 MG tablet; Take 1 tablet by mouth Daily With Breakfast.    Atrial fibrillation, unspecified type (CMS/HCC)  -     Discontinue: rivaroxaban  (XARELTO) 20 MG tablet; Take 1 tablet by mouth Daily.  -     rivaroxaban (XARELTO) 20 MG tablet; Take 1 tablet by mouth Daily.    Depression, unspecified depression type  -     sertraline (ZOLOFT) 100 MG tablet; 1 tab in the am and 1/2 in the pm    Chronic left-sided low back pain with left-sided sciatica  -     tiZANidine (ZANAFLEX) 4 MG tablet; Take 1 tablet by mouth Every 8 (Eight) Hours As Needed for Muscle Spasms.      She also requested refills on her other maintenance meds.

## 2018-09-10 ENCOUNTER — HOSPITAL ENCOUNTER (INPATIENT)
Facility: HOSPITAL | Age: 73
LOS: 3 days | Discharge: HOME | End: 2018-09-13
Attending: EMERGENCY MEDICINE | Admitting: FAMILY MEDICINE

## 2018-09-10 ENCOUNTER — APPOINTMENT (OUTPATIENT)
Dept: GENERAL RADIOLOGY | Facility: HOSPITAL | Age: 73
End: 2018-09-10

## 2018-09-10 DIAGNOSIS — I48.91 ATRIAL FIBRILLATION WITH RVR (HCC): Primary | ICD-10-CM

## 2018-09-10 LAB
ALBUMIN SERPL-MCNC: 3.8 G/DL (ref 3.4–4.8)
ALBUMIN/GLOB SERPL: 1.2 G/DL (ref 1.1–1.8)
ALP SERPL-CCNC: 56 U/L (ref 38–126)
ALT SERPL W P-5'-P-CCNC: 34 U/L (ref 9–52)
ANION GAP SERPL CALCULATED.3IONS-SCNC: 10 MMOL/L (ref 5–15)
ARTICHOKE IGE QN: 75 MG/DL (ref 1–129)
AST SERPL-CCNC: 28 U/L (ref 14–36)
BACTERIA UR QL AUTO: ABNORMAL /HPF
BASOPHILS # BLD AUTO: 0.02 10*3/MM3 (ref 0–0.2)
BASOPHILS NFR BLD AUTO: 0.3 % (ref 0–2)
BILIRUB SERPL-MCNC: 0.5 MG/DL (ref 0.2–1.3)
BILIRUB UR QL STRIP: NEGATIVE
BUN BLD-MCNC: 16 MG/DL (ref 7–21)
BUN/CREAT SERPL: 16.8 (ref 7–25)
CALCIUM SPEC-SCNC: 9.1 MG/DL (ref 8.4–10.2)
CHLORIDE SERPL-SCNC: 105 MMOL/L (ref 95–110)
CHOLEST SERPL-MCNC: 148 MG/DL (ref 0–199)
CLARITY UR: CLEAR
CO2 SERPL-SCNC: 27 MMOL/L (ref 22–31)
COLOR UR: YELLOW
CREAT BLD-MCNC: 0.95 MG/DL (ref 0.5–1)
DEPRECATED RDW RBC AUTO: 40.2 FL (ref 36.4–46.3)
EOSINOPHIL # BLD AUTO: 0.2 10*3/MM3 (ref 0–0.7)
EOSINOPHIL NFR BLD AUTO: 2.9 % (ref 0–7)
ERYTHROCYTE [DISTWIDTH] IN BLOOD BY AUTOMATED COUNT: 12.8 % (ref 11.5–14.5)
GFR SERPL CREATININE-BSD FRML MDRD: 58 ML/MIN/1.73 (ref 39–90)
GLOBULIN UR ELPH-MCNC: 3.3 GM/DL (ref 2.3–3.5)
GLUCOSE BLD-MCNC: 129 MG/DL (ref 60–100)
GLUCOSE BLDC GLUCOMTR-MCNC: 122 MG/DL (ref 70–130)
GLUCOSE UR STRIP-MCNC: NEGATIVE MG/DL
HCT VFR BLD AUTO: 39.5 % (ref 35–45)
HDLC SERPL-MCNC: 24 MG/DL (ref 60–200)
HGB BLD-MCNC: 13.7 G/DL (ref 12–15.5)
HGB UR QL STRIP.AUTO: ABNORMAL
HOLD SPECIMEN: NORMAL
HYALINE CASTS UR QL AUTO: ABNORMAL /LPF
IMM GRANULOCYTES # BLD: 0.01 10*3/MM3 (ref 0–0.02)
IMM GRANULOCYTES NFR BLD: 0.1 % (ref 0–0.5)
KETONES UR QL STRIP: NEGATIVE
LDLC/HDLC SERPL: 2.23 {RATIO} (ref 0–3.22)
LEUKOCYTE ESTERASE UR QL STRIP.AUTO: ABNORMAL
LYMPHOCYTES # BLD AUTO: 1.73 10*3/MM3 (ref 0.6–4.2)
LYMPHOCYTES NFR BLD AUTO: 24.9 % (ref 10–50)
MAGNESIUM SERPL-MCNC: 1.9 MG/DL (ref 1.6–2.3)
MCH RBC QN AUTO: 30 PG (ref 26.5–34)
MCHC RBC AUTO-ENTMCNC: 34.7 G/DL (ref 31.4–36)
MCV RBC AUTO: 86.4 FL (ref 80–98)
MONOCYTES # BLD AUTO: 0.52 10*3/MM3 (ref 0–0.9)
MONOCYTES NFR BLD AUTO: 7.5 % (ref 0–12)
NEUTROPHILS # BLD AUTO: 4.48 10*3/MM3 (ref 2–8.6)
NEUTROPHILS NFR BLD AUTO: 64.3 % (ref 37–80)
NITRITE UR QL STRIP: NEGATIVE
NT-PROBNP SERPL-MCNC: 1370 PG/ML (ref 0–900)
PH UR STRIP.AUTO: 6.5 [PH] (ref 5–9)
PLATELET # BLD AUTO: 251 10*3/MM3 (ref 150–450)
PMV BLD AUTO: 8.7 FL (ref 8–12)
POTASSIUM BLD-SCNC: 4.1 MMOL/L (ref 3.5–5.1)
PROT SERPL-MCNC: 7.1 G/DL (ref 6.3–8.6)
PROT UR QL STRIP: ABNORMAL
RBC # BLD AUTO: 4.57 10*6/MM3 (ref 3.77–5.16)
RBC # UR: ABNORMAL /HPF
REF LAB TEST METHOD: ABNORMAL
SODIUM BLD-SCNC: 142 MMOL/L (ref 137–145)
SP GR UR STRIP: 1.02 (ref 1–1.03)
SQUAMOUS #/AREA URNS HPF: ABNORMAL /HPF
T4 FREE SERPL-MCNC: 1.04 NG/DL (ref 0.78–2.19)
TRIGL SERPL-MCNC: 352 MG/DL (ref 20–199)
TROPONIN I SERPL-MCNC: 0.01 NG/ML
TROPONIN I SERPL-MCNC: <0.012 NG/ML
TSH SERPL DL<=0.05 MIU/L-ACNC: 1.94 MIU/ML (ref 0.46–4.68)
UROBILINOGEN UR QL STRIP: ABNORMAL
WBC NRBC COR # BLD: 6.96 10*3/MM3 (ref 3.2–9.8)
WBC UR QL AUTO: ABNORMAL /HPF
WHOLE BLOOD HOLD SPECIMEN: NORMAL
WHOLE BLOOD HOLD SPECIMEN: NORMAL

## 2018-09-10 PROCEDURE — 80061 LIPID PANEL: CPT | Performed by: FAMILY MEDICINE

## 2018-09-10 PROCEDURE — 71045 X-RAY EXAM CHEST 1 VIEW: CPT

## 2018-09-10 PROCEDURE — 99284 EMERGENCY DEPT VISIT MOD MDM: CPT

## 2018-09-10 PROCEDURE — 84484 ASSAY OF TROPONIN QUANT: CPT | Performed by: EMERGENCY MEDICINE

## 2018-09-10 PROCEDURE — 84439 ASSAY OF FREE THYROXINE: CPT | Performed by: FAMILY MEDICINE

## 2018-09-10 PROCEDURE — 83735 ASSAY OF MAGNESIUM: CPT | Performed by: FAMILY MEDICINE

## 2018-09-10 PROCEDURE — 84443 ASSAY THYROID STIM HORMONE: CPT | Performed by: FAMILY MEDICINE

## 2018-09-10 PROCEDURE — 85025 COMPLETE CBC W/AUTO DIFF WBC: CPT | Performed by: EMERGENCY MEDICINE

## 2018-09-10 PROCEDURE — 84484 ASSAY OF TROPONIN QUANT: CPT | Performed by: FAMILY MEDICINE

## 2018-09-10 PROCEDURE — 93010 ELECTROCARDIOGRAM REPORT: CPT | Performed by: INTERNAL MEDICINE

## 2018-09-10 PROCEDURE — 82962 GLUCOSE BLOOD TEST: CPT

## 2018-09-10 PROCEDURE — 83880 ASSAY OF NATRIURETIC PEPTIDE: CPT | Performed by: EMERGENCY MEDICINE

## 2018-09-10 PROCEDURE — 93005 ELECTROCARDIOGRAM TRACING: CPT

## 2018-09-10 PROCEDURE — 81001 URINALYSIS AUTO W/SCOPE: CPT | Performed by: EMERGENCY MEDICINE

## 2018-09-10 PROCEDURE — 83036 HEMOGLOBIN GLYCOSYLATED A1C: CPT | Performed by: FAMILY MEDICINE

## 2018-09-10 PROCEDURE — 93005 ELECTROCARDIOGRAM TRACING: CPT | Performed by: EMERGENCY MEDICINE

## 2018-09-10 PROCEDURE — 80053 COMPREHEN METABOLIC PANEL: CPT | Performed by: EMERGENCY MEDICINE

## 2018-09-10 RX ORDER — ONDANSETRON 4 MG/1
4 TABLET, ORALLY DISINTEGRATING ORAL EVERY 6 HOURS PRN
Status: DISCONTINUED | OUTPATIENT
Start: 2018-09-10 | End: 2018-09-13 | Stop reason: HOSPADM

## 2018-09-10 RX ORDER — PROPAFENONE HYDROCHLORIDE 150 MG/1
150 TABLET, COATED ORAL EVERY 8 HOURS
COMMUNITY
End: 2018-11-13 | Stop reason: ALTCHOICE

## 2018-09-10 RX ORDER — METOPROLOL TARTRATE 5 MG/5ML
5 INJECTION INTRAVENOUS ONCE
Status: COMPLETED | OUTPATIENT
Start: 2018-09-10 | End: 2018-09-10

## 2018-09-10 RX ORDER — OXYCODONE AND ACETAMINOPHEN 10; 325 MG/1; MG/1
1 TABLET ORAL EVERY 6 HOURS PRN
COMMUNITY

## 2018-09-10 RX ORDER — OXYCODONE AND ACETAMINOPHEN 10; 325 MG/1; MG/1
1 TABLET ORAL EVERY 6 HOURS PRN
Status: DISCONTINUED | OUTPATIENT
Start: 2018-09-10 | End: 2018-09-13 | Stop reason: HOSPADM

## 2018-09-10 RX ORDER — NICOTINE POLACRILEX 4 MG
15 LOZENGE BUCCAL
Status: DISCONTINUED | OUTPATIENT
Start: 2018-09-10 | End: 2018-09-13 | Stop reason: HOSPADM

## 2018-09-10 RX ORDER — DEXTROSE MONOHYDRATE 25 G/50ML
25 INJECTION, SOLUTION INTRAVENOUS
Status: DISCONTINUED | OUTPATIENT
Start: 2018-09-10 | End: 2018-09-12

## 2018-09-10 RX ORDER — TIZANIDINE 4 MG/1
4 TABLET ORAL EVERY 8 HOURS PRN
Status: DISCONTINUED | OUTPATIENT
Start: 2018-09-10 | End: 2018-09-13 | Stop reason: HOSPADM

## 2018-09-10 RX ORDER — ONDANSETRON 2 MG/ML
4 INJECTION INTRAMUSCULAR; INTRAVENOUS EVERY 6 HOURS PRN
Status: DISCONTINUED | OUTPATIENT
Start: 2018-09-10 | End: 2018-09-13 | Stop reason: HOSPADM

## 2018-09-10 RX ORDER — ATORVASTATIN CALCIUM 40 MG/1
40 TABLET, FILM COATED ORAL NIGHTLY
Status: DISCONTINUED | OUTPATIENT
Start: 2018-09-10 | End: 2018-09-13 | Stop reason: HOSPADM

## 2018-09-10 RX ORDER — ONDANSETRON 4 MG/1
4 TABLET, FILM COATED ORAL EVERY 6 HOURS PRN
Status: DISCONTINUED | OUTPATIENT
Start: 2018-09-10 | End: 2018-09-13 | Stop reason: HOSPADM

## 2018-09-10 RX ORDER — SODIUM CHLORIDE 0.9 % (FLUSH) 0.9 %
1-10 SYRINGE (ML) INJECTION AS NEEDED
Status: DISCONTINUED | OUTPATIENT
Start: 2018-09-10 | End: 2018-09-12

## 2018-09-10 RX ORDER — SENNA AND DOCUSATE SODIUM 50; 8.6 MG/1; MG/1
2 TABLET, FILM COATED ORAL NIGHTLY PRN
Status: DISCONTINUED | OUTPATIENT
Start: 2018-09-10 | End: 2018-09-13 | Stop reason: HOSPADM

## 2018-09-10 RX ADMIN — ATORVASTATIN CALCIUM 40 MG: 40 TABLET, FILM COATED ORAL at 21:25

## 2018-09-10 RX ADMIN — METOPROLOL TARTRATE 5 MG: 1 INJECTION, SOLUTION INTRAVENOUS at 15:33

## 2018-09-10 RX ADMIN — DILTIAZEM HYDROCHLORIDE 5 MG/HR: 5 INJECTION INTRAVENOUS at 17:56

## 2018-09-10 NOTE — ED PROVIDER NOTES
Subjective   72 years old female with history of paroxysmal atrial fibrillation, hypertension, hyperlipidemia, currently on anticoagulants presented in the ER with sudden onset onset palpitations and shortness of breath when she woke up.  It gets worse with activity.  She is a constant feeling of pressure and tightness in the chest.  She checked her heart rate was was in 150s and 160s earlier with hypotension.  Patient reports she gets similar symptoms with A. fib flareup in the past.  She is feeling fatigued and tired all over.        History provided by:  Patient  Hypotension   Associated symptoms: shortness of breath    Associated symptoms: no abdominal pain, no chest pain, no congestion, no sore throat and no vomiting    Palpitations   Palpitations quality:  Irregular  Onset quality:  Sudden  Duration: Morning.  Timing:  Constant  Progression:  Worsening  Chronicity:  New  Relieved by:  Nothing  Worsened by:  Nothing  Ineffective treatments:  None tried  Associated symptoms: chest pressure, malaise/fatigue and shortness of breath    Associated symptoms: no back pain, no chest pain, no diaphoresis, no dizziness, no near-syncope, no vomiting and no weakness    Risk factors: heart disease and hx of atrial fibrillation        Review of Systems   Constitutional: Positive for malaise/fatigue. Negative for diaphoresis.   HENT: Negative for congestion, sinus pressure and sore throat.    Eyes: Negative for pain.   Respiratory: Positive for chest tightness and shortness of breath.    Cardiovascular: Positive for palpitations. Negative for chest pain and near-syncope.   Gastrointestinal: Negative for abdominal pain and vomiting.   Genitourinary: Negative for flank pain.   Musculoskeletal: Negative for back pain.   Skin: Negative for color change.   Neurological: Negative for dizziness and weakness.   Psychiatric/Behavioral: Negative for agitation.       Past Medical History:   Diagnosis Date   • Acute sinusitis    •  Anorectal fistula     Anorectal fistula - status post repair      • Atrial fibrillation (CMS/HCC)    • Backache    • Carpal tunnel syndrome    • Chest pain, non-cardiac    • Degenerative joint disease involving multiple joints    • Depressive disorder    • Diarrhea    • Dyspnea    • Electrocardiogram abnormal    • Essential hypertension    • Female stress incontinence    • Generalized anxiety disorder    • GERD (gastroesophageal reflux disease)    • H/O tubal ligation    • Hemorrhoids    • Hypercholesterolemia    • Hyperlipidemia    • Hypertensive disorder    • Low back pain     C/O - low back pain      • Normal gynecologic examination    • Obesity    • Otitis media, left    • Palpitations     a   • Primary fibromyalgia syndrome    • Sleep apnea    • Tachycardia    • Type 2 diabetes mellitus (CMS/HCC)        Allergies   Allergen Reactions   • Adhesive Tape    • Celebrex [Celecoxib]    • Darvon [Propoxyphene]    • Demerol [Meperidine]    • Dilantin [Phenytoin]    • Dilaudid [Hydromorphone Hcl]    • Iodinated Diagnostic Agents    • Latex    • Morphine And Related    • Nsaids    • Penicillins Swelling     Patient had a reaction to penicillin in 2010.  Reaction included hives and swelling.  Patient states she has tolerated cephalexin in the past.   • Phenergan [Promethazine]    • Vioxx [Rofecoxib]    • Vistaril [Hydroxyzine Hcl]        Past Surgical History:   Procedure Laterality Date   • BARIATRIC SURGERY     • BREAST BIOPSY     • CARDIAC ABLATION     • COLONOSCOPY N/A 3/31/2017    Procedure: COLONOSCOPY;  Surgeon: Pan Muller MD;  Location: [facility] ENDOSCOPY;  Service:    • ENDOSCOPY N/A 3/31/2017    Procedure: ESOPHAGOGASTRODUODENOSCOPY dilation;  Surgeon: Pan Muller MD;  Location: [facility] ENDOSCOPY;  Service:    • ENDOSCOPY AND COLONOSCOPY  09/27/2000     A single small sessile polyp was seen in the rectum which measured 1 mm.211.3 External hemorrhoids were present. 455.3    • INJECTION OF MEDICATION   01/07/2016    Celestone (betamethasone) (2)        • JOINT REPLACEMENT      right knee 2008   • KNEE ARTHROSCOPY  07/09/2008     Right total knee atthroplasty. Osteoarthritis of the right knee.    • STOMACH SURGERY  1976    Revise stomach-bowel fusion (1)    history of jejunal surgery    • TONSILLECTOMY     • TUBAL ABDOMINAL LIGATION     • UPPER GASTROINTESTINAL ENDOSCOPY  03/31/2017       Family History   Problem Relation Age of Onset   • Hyperlipidemia Mother    • Hypertension Mother    • Lung cancer Mother    • Hyperlipidemia Father    • Hypertension Father    • Lung cancer Sister    • Gallbladder disease Maternal Grandmother    • Heart disease Paternal Grandmother    • Diabetes Paternal Grandfather        Social History     Social History   • Marital status:      Social History Main Topics   • Smoking status: Never Smoker   • Smokeless tobacco: Never Used   • Alcohol use No   • Drug use: No   • Sexual activity: Not Currently     Partners: Male     Other Topics Concern   • Not on file           Objective   Physical Exam   Constitutional: She is oriented to person, place, and time. She appears well-developed and well-nourished.   HENT:   Head: Normocephalic and atraumatic.   Nose: Nose normal.   Mouth/Throat: Oropharynx is clear and moist.   Eyes: Conjunctivae are normal.   Neck: Normal range of motion. Neck supple.   Cardiovascular: Intact distal pulses.  An irregularly irregular rhythm present. Tachycardia present.    Pulmonary/Chest: Effort normal and breath sounds normal. She has no wheezes.   Abdominal: Soft. She exhibits no distension. There is no tenderness.   Musculoskeletal: Normal range of motion.   Neurological: She is oriented to person, place, and time.   Skin: Skin is warm and dry. Capillary refill takes less than 2 seconds.   Psychiatric: She has a normal mood and affect.   Nursing note and vitals reviewed.      ECG 12 Lead    Date/Time: 9/10/2018 2:20 PM  Performed by: GUNJAN  CHRISTOPHER  Authorized by: CHRISTOPHER HOLLINS   Interpreted by physician  Rhythm: atrial fibrillation  BPM: 109  QRS axis: normal  Conduction: left bundle branch block  Clinical impression: abnormal ECG and dysrhythmia - atrial  Comments: Nonspecific ST and T-wave changes.                 ED Course                  MDM  Number of Diagnoses or Management Options  Atrial fibrillation with RVR (CMS/HCC):   Diagnosis management comments: 72 years old female presented with palpitations and chest tightness along with some shortness of breath.  On presentation in the ER she was in A. fib with RVR.  Her heart rate was only in 110s.  She is given a dose of Lopressor which slowed her heart but her tachyarrhythmia came back.  Has negative initial chest pain workup.  She started on Cardizem as she was bouncing back in 120s.  I have discussed with Dr. Gonzales.  Patient is admitted.       Amount and/or Complexity of Data Reviewed  Clinical lab tests: ordered and reviewed  Tests in the radiology section of CPT®: ordered and reviewed  Discuss the patient with other providers: yes        Labs Reviewed   COMPREHENSIVE METABOLIC PANEL - Abnormal; Notable for the following:        Result Value    Glucose 129 (*)     All other components within normal limits    Narrative:     The MDRD GFR formula is only valid for adults with stable renal function between ages 18 and 70.   URINALYSIS W/ MICROSCOPIC IF INDICATED (NO CULTURE) - Abnormal; Notable for the following:     Blood, UA Moderate (2+) (*)     Protein, UA 30 mg/dL (1+) (*)     Leuk Esterase, UA Trace (*)     All other components within normal limits   BNP (IN-HOUSE) - Abnormal; Notable for the following:     proBNP 1,370.0 (*)     All other components within normal limits   URINALYSIS, MICROSCOPIC ONLY - Abnormal; Notable for the following:     RBC, UA 3-5 (*)     Bacteria, UA 4+ (*)     Squamous Epithelial Cells, UA 3-5 (*)     All other components within normal limits   LIPID PANEL -  Abnormal; Notable for the following:     Triglycerides 352 (*)     HDL Cholesterol 24 (*)     All other components within normal limits   TROPONIN (IN-HOUSE) - Normal   CBC WITH AUTO DIFFERENTIAL - Normal   MAGNESIUM - Normal   TSH - Normal   T4, FREE - Normal   TROPONIN (IN-HOUSE) - Normal   POCT GLUCOSE FINGERSTICK - Normal   RAINBOW DRAW    Narrative:     The following orders were created for panel order [place] Draw.  Procedure                               Abnormality         Status                     ---------                               -----------         ------                     Light Blue Top[[id number]]                                   Final result               Green Top (Gel)[[id number]]                                  Final result               Lavender Top[[id number]]                                     Final result               Gold Top - SST[[id number]]                                   Final result                 Please view results for these tests on the individual orders.   TROPONIN (IN-HOUSE)   HEMOGLOBIN A1C   BASIC METABOLIC PANEL   CBC WITH AUTO DIFFERENTIAL   POCT GLUCOSE FINGERSTICK   POCT GLUCOSE FINGERSTICK   POCT GLUCOSE FINGERSTICK   CBC AND DIFFERENTIAL    Narrative:     The following orders were created for panel order CBC & Differential.  Procedure                               Abnormality         Status                     ---------                               -----------         ------                     CBC Auto Differential[[id number]]        Normal              Final result                 Please view results for these tests on the individual orders.   LIGHT BLUE TOP   GREEN TOP   LAVENDER TOP   GOLD TOP - SST   EXTRA TUBES    Narrative:     The following orders were created for panel order Extra Tubes.  Procedure                               Abnormality         Status                     ---------                               -----------         ------                      Gold Top - SST[[id number]]                                   Final result                 Please view results for these tests on the individual orders.   GOLD TOP - SST   CBC AND DIFFERENTIAL    Narrative:     The following orders were created for panel order CBC & Differential.  Procedure                               Abnormality         Status                     ---------                               -----------         ------                     CBC Auto Differential[[id number]]                                                         Please view results for these tests on the individual orders.       Xr Chest 1 View    Result Date: 9/10/2018  Narrative: EXAM:         Radiograph(s), Chest VIEWS:   Frontal  ; 1     DATE/TIME:  9/10/2018 3:42 PM CDT            INDICATION:   chest discomfort, atrial fib  COMPARISON:  CXR: 1/9/17         FINDINGS:         - lines/tubes:    none   - cardiac:         size within normal limits       - mediastinum: contour within normal limits       - lungs:         no focal air space process, pulmonary interstitial edema, nodule(s)/mass           - pleura:         no evidence of  fluid                - osseous:         unremarkable for age                - misc.:        Impression: CONCLUSION:    1. No evidence of an active cardiopulmonary process.                                                  Electronically signed by:  SUJATA Pruitt MD  9/10/2018 3:54 PM CDT Workstation: [id number]        Final diagnoses:   Atrial fibrillation with RVR (CMS/HCC)            Keaton Castillo MD  09/11/18 0156

## 2018-09-11 ENCOUNTER — APPOINTMENT (OUTPATIENT)
Dept: CARDIOLOGY | Facility: HOSPITAL | Age: 73
End: 2018-09-11
Attending: FAMILY MEDICINE

## 2018-09-11 PROBLEM — IMO0001 CLASS 3 OBESITY DUE TO EXCESS CALORIES WITH SERIOUS COMORBIDITY AND BODY MASS INDEX (BMI) OF 45.0 TO 49.9 IN ADULT: Status: ACTIVE | Noted: 2018-09-11

## 2018-09-11 PROBLEM — Z79.899 POLYPHARMACY: Status: ACTIVE | Noted: 2018-09-11

## 2018-09-11 LAB
ANION GAP SERPL CALCULATED.3IONS-SCNC: 7 MMOL/L (ref 5–15)
BASOPHILS # BLD AUTO: 0.04 10*3/MM3 (ref 0–0.2)
BASOPHILS NFR BLD AUTO: 0.5 % (ref 0–2)
BH CV ECHO MEAS - ACS: 2.4 CM
BH CV ECHO MEAS - AO MAX PG (FULL): 1.8 MMHG
BH CV ECHO MEAS - AO MAX PG: 9.5 MMHG
BH CV ECHO MEAS - AO MEAN PG (FULL): 2 MMHG
BH CV ECHO MEAS - AO MEAN PG: 6 MMHG
BH CV ECHO MEAS - AO ROOT AREA (BSA CORRECTED): 1.4
BH CV ECHO MEAS - AO ROOT AREA: 7.1 CM^2
BH CV ECHO MEAS - AO ROOT DIAM: 3 CM
BH CV ECHO MEAS - AO V2 MAX: 154 CM/SEC
BH CV ECHO MEAS - AO V2 MEAN: 118 CM/SEC
BH CV ECHO MEAS - AO V2 VTI: 25.8 CM
BH CV ECHO MEAS - AVA(I,A): 3.3 CM^2
BH CV ECHO MEAS - AVA(I,D): 3.3 CM^2
BH CV ECHO MEAS - AVA(V,A): 2.8 CM^2
BH CV ECHO MEAS - AVA(V,D): 2.8 CM^2
BH CV ECHO MEAS - BSA(HAYCOCK): 2.4 M^2
BH CV ECHO MEAS - BSA: 2.2 M^2
BH CV ECHO MEAS - BZI_BMI: 45.8 KILOGRAMS/M^2
BH CV ECHO MEAS - BZI_METRIC_HEIGHT: 162.6 CM
BH CV ECHO MEAS - BZI_METRIC_WEIGHT: 121.1 KG
BH CV ECHO MEAS - EDV(CUBED): 48.2 ML
BH CV ECHO MEAS - EDV(TEICH): 55.9 ML
BH CV ECHO MEAS - EF(CUBED): 64.4 %
BH CV ECHO MEAS - EF(TEICH): 56.8 %
BH CV ECHO MEAS - ESV(CUBED): 17.2 ML
BH CV ECHO MEAS - ESV(TEICH): 24.1 ML
BH CV ECHO MEAS - FS: 29.1 %
BH CV ECHO MEAS - IVS/LVPW: 0.88
BH CV ECHO MEAS - IVSD: 1.1 CM
BH CV ECHO MEAS - LA DIMENSION: 3.9 CM
BH CV ECHO MEAS - LA/AO: 1.3
BH CV ECHO MEAS - LV MASS(C)D: 146.5 GRAMS
BH CV ECHO MEAS - LV MASS(C)DI: 66.3 GRAMS/M^2
BH CV ECHO MEAS - LV MAX PG: 7.7 MMHG
BH CV ECHO MEAS - LV MEAN PG: 4 MMHG
BH CV ECHO MEAS - LV V1 MAX: 139 CM/SEC
BH CV ECHO MEAS - LV V1 MEAN: 96.5 CM/SEC
BH CV ECHO MEAS - LV V1 VTI: 27.2 CM
BH CV ECHO MEAS - LVIDD: 3.6 CM
BH CV ECHO MEAS - LVIDS: 2.6 CM
BH CV ECHO MEAS - LVOT AREA (M): 3.1 CM^2
BH CV ECHO MEAS - LVOT AREA: 3.1 CM^2
BH CV ECHO MEAS - LVOT DIAM: 2 CM
BH CV ECHO MEAS - LVPWD: 1.3 CM
BH CV ECHO MEAS - MV A MAX VEL: 52.4 CM/SEC
BH CV ECHO MEAS - MV DEC SLOPE: 651 CM/SEC^2
BH CV ECHO MEAS - MV E MAX VEL: 122 CM/SEC
BH CV ECHO MEAS - MV E/A: 2.3
BH CV ECHO MEAS - MV P1/2T MAX VEL: 112 CM/SEC
BH CV ECHO MEAS - MV P1/2T: 50.4 MSEC
BH CV ECHO MEAS - MVA P1/2T LCG: 2 CM^2
BH CV ECHO MEAS - MVA(P1/2T): 4.4 CM^2
BH CV ECHO MEAS - PA MAX PG: 5.6 MMHG
BH CV ECHO MEAS - PA V2 MAX: 118 CM/SEC
BH CV ECHO MEAS - RAP SYSTOLE: 5 MMHG
BH CV ECHO MEAS - RVDD: 2.2 CM
BH CV ECHO MEAS - RVSP: 30 MMHG
BH CV ECHO MEAS - SI(AO): 82.5 ML/M^2
BH CV ECHO MEAS - SI(CUBED): 14 ML/M^2
BH CV ECHO MEAS - SI(LVOT): 38.6 ML/M^2
BH CV ECHO MEAS - SI(TEICH): 14.4 ML/M^2
BH CV ECHO MEAS - SV(AO): 182.4 ML
BH CV ECHO MEAS - SV(CUBED): 31.1 ML
BH CV ECHO MEAS - SV(LVOT): 85.5 ML
BH CV ECHO MEAS - SV(TEICH): 31.8 ML
BH CV ECHO MEAS - TR MAX VEL: 250 CM/SEC
BUN BLD-MCNC: 21 MG/DL (ref 7–21)
BUN/CREAT SERPL: 21.6 (ref 7–25)
CALCIUM SPEC-SCNC: 8.7 MG/DL (ref 8.4–10.2)
CHLORIDE SERPL-SCNC: 106 MMOL/L (ref 95–110)
CO2 SERPL-SCNC: 27 MMOL/L (ref 22–31)
CREAT BLD-MCNC: 0.97 MG/DL (ref 0.5–1)
DEPRECATED RDW RBC AUTO: 41.2 FL (ref 36.4–46.3)
EOSINOPHIL # BLD AUTO: 0.26 10*3/MM3 (ref 0–0.7)
EOSINOPHIL NFR BLD AUTO: 2.9 % (ref 0–7)
ERYTHROCYTE [DISTWIDTH] IN BLOOD BY AUTOMATED COUNT: 12.9 % (ref 11.5–14.5)
GFR SERPL CREATININE-BSD FRML MDRD: 57 ML/MIN/1.73 (ref 39–90)
GLUCOSE BLD-MCNC: 118 MG/DL (ref 60–100)
GLUCOSE BLDC GLUCOMTR-MCNC: 123 MG/DL (ref 70–130)
GLUCOSE BLDC GLUCOMTR-MCNC: 130 MG/DL (ref 70–130)
GLUCOSE BLDC GLUCOMTR-MCNC: 139 MG/DL (ref 70–130)
GLUCOSE BLDC GLUCOMTR-MCNC: 160 MG/DL (ref 70–130)
HBA1C MFR BLD: 6.2 % (ref 4–5.6)
HCT VFR BLD AUTO: 37.5 % (ref 35–45)
HGB BLD-MCNC: 12.9 G/DL (ref 12–15.5)
IMM GRANULOCYTES # BLD: 0.01 10*3/MM3 (ref 0–0.02)
IMM GRANULOCYTES NFR BLD: 0.1 % (ref 0–0.5)
LV EF 2D ECHO EST: 55 %
LYMPHOCYTES # BLD AUTO: 2.64 10*3/MM3 (ref 0.6–4.2)
LYMPHOCYTES NFR BLD AUTO: 29.8 % (ref 10–50)
MAXIMAL PREDICTED HEART RATE: 149 BPM
MCH RBC QN AUTO: 30.3 PG (ref 26.5–34)
MCHC RBC AUTO-ENTMCNC: 34.4 G/DL (ref 31.4–36)
MCV RBC AUTO: 88 FL (ref 80–98)
MONOCYTES # BLD AUTO: 0.74 10*3/MM3 (ref 0–0.9)
MONOCYTES NFR BLD AUTO: 8.4 % (ref 0–12)
NEUTROPHILS # BLD AUTO: 5.16 10*3/MM3 (ref 2–8.6)
NEUTROPHILS NFR BLD AUTO: 58.3 % (ref 37–80)
NRBC BLD MANUAL-RTO: 0 /100 WBC (ref 0–0)
PLATELET # BLD AUTO: 263 10*3/MM3 (ref 150–450)
PMV BLD AUTO: 8 FL (ref 8–12)
POTASSIUM BLD-SCNC: 3.9 MMOL/L (ref 3.5–5.1)
RBC # BLD AUTO: 4.26 10*6/MM3 (ref 3.77–5.16)
SODIUM BLD-SCNC: 140 MMOL/L (ref 137–145)
STRESS TARGET HR: 127 BPM
TROPONIN I SERPL-MCNC: <0.012 NG/ML
TROPONIN I SERPL-MCNC: <0.012 NG/ML
WBC NRBC COR # BLD: 8.85 10*3/MM3 (ref 3.2–9.8)

## 2018-09-11 PROCEDURE — 63710000001 INSULIN ASPART PER 5 UNITS: Performed by: FAMILY MEDICINE

## 2018-09-11 PROCEDURE — 99222 1ST HOSP IP/OBS MODERATE 55: CPT | Performed by: FAMILY MEDICINE

## 2018-09-11 PROCEDURE — 93005 ELECTROCARDIOGRAM TRACING: CPT | Performed by: INTERNAL MEDICINE

## 2018-09-11 PROCEDURE — 93306 TTE W/DOPPLER COMPLETE: CPT | Performed by: INTERNAL MEDICINE

## 2018-09-11 PROCEDURE — 93010 ELECTROCARDIOGRAM REPORT: CPT | Performed by: INTERNAL MEDICINE

## 2018-09-11 PROCEDURE — 82962 GLUCOSE BLOOD TEST: CPT

## 2018-09-11 PROCEDURE — 80048 BASIC METABOLIC PNL TOTAL CA: CPT | Performed by: FAMILY MEDICINE

## 2018-09-11 PROCEDURE — 25010000002 AMIODARONE IN DEXTROSE 5% 360-4.14 MG/200ML-% SOLUTION: Performed by: INTERNAL MEDICINE

## 2018-09-11 PROCEDURE — 99232 SBSQ HOSP IP/OBS MODERATE 35: CPT | Performed by: INTERNAL MEDICINE

## 2018-09-11 PROCEDURE — 25010000002 AMIODARONE IN DEXTROSE 5% 150-4.21 MG/100ML-% SOLUTION: Performed by: INTERNAL MEDICINE

## 2018-09-11 PROCEDURE — 84484 ASSAY OF TROPONIN QUANT: CPT | Performed by: FAMILY MEDICINE

## 2018-09-11 PROCEDURE — 85025 COMPLETE CBC W/AUTO DIFF WBC: CPT | Performed by: FAMILY MEDICINE

## 2018-09-11 PROCEDURE — 93306 TTE W/DOPPLER COMPLETE: CPT

## 2018-09-11 RX ORDER — SODIUM CHLORIDE 9 MG/ML
25 INJECTION, SOLUTION INTRAVENOUS CONTINUOUS
Status: DISCONTINUED | OUTPATIENT
Start: 2018-09-11 | End: 2018-09-12

## 2018-09-11 RX ADMIN — AMIODARONE HYDROCHLORIDE 150 MG: 1.5 INJECTION, SOLUTION INTRAVENOUS at 12:35

## 2018-09-11 RX ADMIN — AMIODARONE HYDROCHLORIDE 0.5 MG/MIN: 1.8 INJECTION, SOLUTION INTRAVENOUS at 18:16

## 2018-09-11 RX ADMIN — RIVAROXABAN 20 MG: 10 TABLET, FILM COATED ORAL at 17:46

## 2018-09-11 RX ADMIN — SODIUM CHLORIDE 25 ML/HR: 9 INJECTION, SOLUTION INTRAVENOUS at 02:03

## 2018-09-11 RX ADMIN — AMIODARONE HYDROCHLORIDE 1 MG/MIN: 1.8 INJECTION, SOLUTION INTRAVENOUS at 12:52

## 2018-09-11 RX ADMIN — ATORVASTATIN CALCIUM 40 MG: 40 TABLET, FILM COATED ORAL at 21:06

## 2018-09-11 RX ADMIN — INSULIN ASPART 2 UNITS: 100 INJECTION, SOLUTION INTRAVENOUS; SUBCUTANEOUS at 21:06

## 2018-09-11 RX ADMIN — SERTRALINE HYDROCHLORIDE 100 MG: 50 TABLET ORAL at 08:17

## 2018-09-11 NOTE — PLAN OF CARE
Problem: Patient Care Overview  Goal: Plan of Care Review  Outcome: Ongoing (interventions implemented as appropriate)   09/11/18 0521   Coping/Psychosocial   Plan of Care Reviewed With patient   Plan of Care Review   Progress improving   OTHER   Outcome Summary Pt VSS, HR remains A fib, 70s-80s, No CP noted

## 2018-09-11 NOTE — PLAN OF CARE
Problem: Patient Care Overview  Goal: Plan of Care Review  Outcome: Ongoing (interventions implemented as appropriate)  Making Lifestyle Changes for a Healthier Weight used to provide ed regarding Wt Loss ADA Diet/Carbohydrate Counting and diet copies given.   09/11/18 8391   Coping/Psychosocial   Plan of Care Reviewed With patient   Plan of Care Review   Progress no change   OTHER   Outcome Summary initial assessment

## 2018-09-11 NOTE — H&P
HISTORY AND PHYSICAL  NAME: Juliana Alcazar  : 1945  MRN: [id number]    DATE OF ADMISSION: 09/10/18    DATE & TIME SEEN: 09/10/18 6:35 PM    PCP: Miranda Llanes APRN    CODE STATUS:   Code Status and Medical Interventions:   Ordered at: 09/10/18 2005     Level Of Support Discussed With:    Patient     Code Status:    CPR     Medical Interventions (Level of Support Prior to Arrest):    Full       CHIEF COMPLAINT  Chief Complaint   Patient presents with   • Atrial Fibrillation   • Hypotension     per patient report at home low BP       HPI:  Juliana Alcazar is a 72 y.o. morbidly obese  female with a concurrent medical history of paroxysmal atrial fibrillation.  Patient has a past medical history of cardiac ablation in 2013 and electrocardioversion in 2018.  Her cardiologist is Dr. Juan Nielsen at [facility] in [place], [place].  Her paroxysmal atrial fibrillation is controlled via rhythm control with Rythmol TID and full anticoagulation with Xarelto.     This morning patient woke up feeling diaphoretic and short of breath that worsened with activity.  Symptoms were associated with generalized weakness and malaise.  Patient reports symptoms were similar to prior episodes of atrial fibrillation.  She checked her blood pressure with an automated sphygmomanometer at home, blood pressure was 88/40 and heart rate was 116.  She then took a dose of her Rythmol which was due at 8 AM.  When symptoms persisted patient presented to the ED for further evaluation.    In the ED patient was found to be in atrial fibrillation with rapid ventricular response, heart rate ranged from 110-120 bpm.  Patient received 1 dose of IV Lopressor 5 mg that was successful in reducing heart rate to 80-90 bpm.  However tachyarrhythmia persisted and patient was started on a Cardizem infusion.  Patient was admitted to the telemetry floor for further management of her atrial fibrillation with  RVR.    CONCURRENT MEDICAL HISTORY:  Past Medical History:   Diagnosis Date   • Acute sinusitis    • Anorectal fistula     Anorectal fistula - status post repair      • Atrial fibrillation (CMS/HCC)    • Backache    • Carpal tunnel syndrome    • Chest pain, non-cardiac    • Degenerative joint disease involving multiple joints    • Depressive disorder    • Diarrhea    • Dyspnea    • Electrocardiogram abnormal    • Essential hypertension    • Female stress incontinence    • Generalized anxiety disorder    • GERD (gastroesophageal reflux disease)    • H/O tubal ligation    • Hemorrhoids    • Hypercholesterolemia    • Hyperlipidemia    • Hypertensive disorder    • Low back pain     C/O - low back pain      • Normal gynecologic examination    • Obesity    • Otitis media, left    • Palpitations     a   • Primary fibromyalgia syndrome    • Sleep apnea    • Tachycardia    • Type 2 diabetes mellitus (CMS/HCC)        PAST SURGICAL HISTORY:  Past Surgical History:   Procedure Laterality Date   • BARIATRIC SURGERY     • BREAST BIOPSY     • CARDIAC ABLATION     • COLONOSCOPY N/A 3/31/2017    Procedure: COLONOSCOPY;  Surgeon: Pan Muller MD;  Location: [facility] ENDOSCOPY;  Service:    • ENDOSCOPY N/A 3/31/2017    Procedure: ESOPHAGOGASTRODUODENOSCOPY dilation;  Surgeon: Pan Muller MD;  Location: [facility] ENDOSCOPY;  Service:    • ENDOSCOPY AND COLONOSCOPY  09/27/2000     A single small sessile polyp was seen in the rectum which measured 1 mm.211.3 External hemorrhoids were present. 455.3    • INJECTION OF MEDICATION  01/07/2016    Celestone (betamethasone) (2)        • JOINT REPLACEMENT      right knee 2008   • KNEE ARTHROSCOPY  07/09/2008     Right total knee atthroplasty. Osteoarthritis of the right knee.    • STOMACH SURGERY  1976    Revise stomach-bowel fusion (1)    history of jejunal surgery    • TONSILLECTOMY     • TUBAL ABDOMINAL LIGATION     • UPPER GASTROINTESTINAL ENDOSCOPY  03/31/2017       FAMILY  HISTORY:  Family History   Problem Relation Age of Onset   • Hyperlipidemia Mother    • Hypertension Mother    • Lung cancer Mother    • Hyperlipidemia Father    • Hypertension Father    • Lung cancer Sister    • Gallbladder disease Maternal Grandmother    • Heart disease Paternal Grandmother    • Diabetes Paternal Grandfather         SOCIAL HISTORY:  Social History     Social History   • Marital status:      Spouse name: N/A   • Number of children: N/A   • Years of education: N/A     Occupational History   • Not on file.     Social History Main Topics   • Smoking status: Never Smoker   • Smokeless tobacco: Never Used   • Alcohol use No   • Drug use: No   • Sexual activity: Not Currently     Partners: Male     Other Topics Concern   • Not on file     Social History Narrative   • No narrative on file       HOME MEDICATIONS:  No current facility-administered medications on file prior to encounter.      Current Outpatient Prescriptions on File Prior to Encounter   Medication Sig Dispense Refill   • atorvastatin (LIPITOR) 40 MG tablet Take 1 tablet by mouth Every Night. 90 tablet 1   • diclofenac (VOLTAREN) 75 MG EC tablet Take 1 tablet by mouth 2 (Two) Times a Day. 180 tablet 1   • fenofibrate (LOFIBRA) 54 MG tablet Take 1 tablet by mouth Daily. 90 tablet 1   • furosemide (LASIX) 20 MG tablet Take 1 tablet by mouth Daily. 90 tablet 1   • lisinopril (PRINIVIL,ZESTRIL) 20 MG tablet Take 1 tablet by mouth Daily. 90 tablet 1   • metFORMIN (GLUCOPHAGE) 500 MG tablet Take 1 tablet by mouth Daily With Breakfast. 90 tablet 1   • rivaroxaban (XARELTO) 20 MG tablet Take 1 tablet by mouth Daily. 90 tablet 1   • sertraline (ZOLOFT) 100 MG tablet 1 tab in the am and 1/2 in the pm 135 tablet 1   • terbinafine (LAMISIL AT) 1 % cream Apply  topically to the appropriate area as directed 2 (Two) Times a Day. 42 g 3   • tiZANidine (ZANAFLEX) 4 MG tablet Take 1 tablet by mouth Every 8 (Eight) Hours As Needed for Muscle Spasms. 90  tablet 5   • [DISCONTINUED] metoprolol tartrate (LOPRESSOR) 50 MG tablet Take 1 tablet by mouth 2 (Two) Times a Day. 180 tablet 1       ALLERGIES:  Adhesive tape; Celebrex [celecoxib]; Darvon [propoxyphene]; Demerol [meperidine]; Dilantin [phenytoin]; Dilaudid [hydromorphone hcl]; Iodinated diagnostic agents; Latex; Morphine and related; Nsaids; Penicillins; Phenergan [promethazine]; Vioxx [rofecoxib]; and Vistaril [hydroxyzine hcl]    REVIEW OF SYSTEMS  Review of Systems   Constitutional: Positive for diaphoresis and fatigue. Negative for chills and fever.   HENT: Negative for congestion, rhinorrhea, sneezing and sore throat.    Eyes: Negative for discharge and redness.   Respiratory: Positive for shortness of breath. Negative for cough and wheezing.    Cardiovascular: Negative for chest pain and leg swelling.   Gastrointestinal: Negative for abdominal pain, diarrhea, nausea and vomiting.   Genitourinary: Negative for difficulty urinating, dysuria and hematuria.   Musculoskeletal: Positive for arthralgias (bilateral knee pain) and back pain (chronic). Negative for gait problem and joint swelling.   Skin: Negative for rash and wound.   Neurological: Positive for weakness. Negative for seizures, syncope, light-headedness and headaches.   Psychiatric/Behavioral: Negative for behavioral problems, confusion and sleep disturbance.       PHYSICAL EXAM:  Temp:  [96 °F (35.6 °C)-97.5 °F (36.4 °C)] 96 °F (35.6 °C)  Heart Rate:  [] 78  Resp:  [18-20] 18  BP: (111-142)/(63-94) 129/63  Body mass index is 45.86 kg/m².  Physical Exam   Constitutional: She is oriented to person, place, and time. She appears well-developed and well-nourished. No distress.   Obese   HENT:   Head: Normocephalic and atraumatic.   Right Ear: Tympanic membrane and ear canal normal.   Left Ear: Tympanic membrane and ear canal normal.   Mouth/Throat: Oropharynx is clear and moist.   Eyes: Pupils are equal, round, and reactive to light. Conjunctivae  and EOM are normal. No scleral icterus.   Neck: Normal range of motion. Neck supple. No tracheal deviation present. No thyromegaly present.   Cardiovascular: Normal heart sounds and intact distal pulses.  An irregularly irregular rhythm present. Tachycardia present.    Pulmonary/Chest: Effort normal and breath sounds normal. She has no wheezes. She has no rales.   Abdominal: Soft. Bowel sounds are normal. There is no tenderness.   Musculoskeletal: Normal range of motion. She exhibits no edema or tenderness.   Lymphadenopathy:     She has no cervical adenopathy.   Neurological: She is alert and oriented to person, place, and time. No cranial nerve deficit.   Skin: Skin is warm and dry. No rash noted. She is not diaphoretic.   Psychiatric: She has a normal mood and affect. Her behavior is normal. Judgment and thought content normal.   Nursing note and vitals reviewed.      DIAGNOSTIC DATA:   Lab Results (last 24 hours)     Procedure Component Value Units Date/Time    Extra Tubes [[id number]] Collected:  09/10/18 2054    Specimen:  Blood from Blood, Venous Line Updated:  09/10/18 2201    Narrative:       The following orders were created for panel order Extra Tubes.  Procedure                               Abnormality         Status                     ---------                               -----------         ------                     Gold Top - SST[[id number]]                                   Final result                 Please view results for these tests on the individual orders.    Gold Top - SST [[id number]] Collected:  09/10/18 2054    Specimen:  Blood Updated:  09/10/18 2201     Extra Tube Hold for add-ons.     Comment: Auto resulted.       Troponin [[id number]]  (Normal) Collected:  09/10/18 2049    Specimen:  Blood Updated:  09/10/18 2131     Troponin I 0.013 ng/mL     TSH [[id number]]  (Normal) Collected:  09/10/18 1521    Specimen:  Blood Updated:  09/10/18 2043     TSH 1.940 mIU/mL     Hemoglobin A1c  [[id number]] Collected:  09/10/18 1521    Specimen:  Blood Updated:  09/10/18 2034    T4, Free [[id number]]  (Normal) Collected:  09/10/18 1521    Specimen:  Blood Updated:  09/10/18 2029     Free T4 1.04 ng/dL     Magnesium [[id number]]  (Normal) Collected:  09/10/18 1521    Specimen:  Blood Updated:  09/10/18 2012     Magnesium 1.9 mg/dL     POC Glucose Once [[id number]]  (Normal) Collected:  09/10/18 1931    Specimen:  Blood Updated:  09/10/18 1948     Glucose 122 mg/dL      Comment: Result Not ConfirmedOperator: [id number] [facility] ID: [id number]       Lipid Panel [[id number]]  (Abnormal) Collected:  09/10/18 1521    Specimen:  Blood Updated:  09/10/18 1945     Total Cholesterol 148 mg/dL      Triglycerides 352 (H) mg/dL      HDL Cholesterol 24 (L) mg/dL      LDL Cholesterol  75 mg/dL      LDL/HDL Ratio 2.23    Urinalysis, Microscopic Only - Urine, Clean Catch [[id number]]  (Abnormal) Collected:  09/10/18 1714    Specimen:  Urine from Urine, Clean Catch Updated:  09/10/18 1753     RBC, UA 3-5 (A) /HPF      WBC, UA 3-5 /HPF      Bacteria, UA 4+ (A) /HPF      Squamous Epithelial Cells, UA 3-5 (A) /HPF      Hyaline Casts, UA None Seen /LPF      Methodology Automated Microscopy    Urinalysis With Microscopic If Indicated (No Culture) - Urine, Clean Catch [[id number]]  (Abnormal) Collected:  09/10/18 1714    Specimen:  Urine from Urine, Clean Catch Updated:  09/10/18 1725     Color, UA Yellow     Appearance, UA Clear     pH, UA 6.5     Specific [place], UA 1.024     Comment: Result obtained by Refractometer        Glucose, UA Negative     Ketones, UA Negative     Bilirubin, UA Negative     Blood, UA Moderate (2+) (A)     Protein, UA 30 mg/dL (1+) (A)     Leuk Esterase, UA Trace (A)     Nitrite, UA Negative     Urobilinogen, UA 0.2 E.U./dL    [place] Draw [[id number]] Collected:  09/10/18 1521    Specimen:  Blood Updated:  09/10/18 1632    Narrative:       The following orders were created for panel order  [place] Draw.  Procedure                               Abnormality         Status                     ---------                               -----------         ------                     Light Blue Top[[id number]]                                   Final result               Green Top (Gel)[[id number]]                                  Final result               Lavender Top[[id number]]                                     Final result               Gold Top - SST[[id number]]                                   Final result                 Please view results for these tests on the individual orders.    Light Blue Top [[id number]] Collected:  09/10/18 1521    Specimen:  Blood Updated:  09/10/18 1632     Extra Tube hold for add-on     Comment: Auto resulted       Green Top (Gel) [[id number]] Collected:  09/10/18 1521    Specimen:  Blood Updated:  09/10/18 1632     Extra Tube Hold for add-ons.     Comment: Auto resulted.       Lavender Top [[id number]] Collected:  09/10/18 1521    Specimen:  Blood Updated:  09/10/18 1632     Extra Tube hold for add-on     Comment: Auto resulted       Gold Top - SST [[id number]] Collected:  09/10/18 1521    Specimen:  Blood Updated:  09/10/18 1632     Extra Tube Hold for add-ons.     Comment: Auto resulted.       BNP [[id number]]  (Abnormal) Collected:  09/10/18 1521    Specimen:  Blood Updated:  09/10/18 1602     proBNP 1,370.0 (H) pg/mL     Troponin [[id number]]  (Normal) Collected:  09/10/18 1521    Specimen:  Blood Updated:  09/10/18 1602     Troponin I <0.012 ng/mL     Comprehensive Metabolic Panel [[id number]]  (Abnormal) Collected:  09/10/18 1521    Specimen:  Blood Updated:  09/10/18 1551     Glucose 129 (H) mg/dL      BUN 16 mg/dL      Creatinine 0.95 mg/dL      Sodium 142 mmol/L      Potassium 4.1 mmol/L      Chloride 105 mmol/L      CO2 27.0 mmol/L      Calcium 9.1 mg/dL      Total Protein 7.1 g/dL      Albumin 3.80 g/dL      ALT (SGPT) 34 U/L      AST (SGOT) 28 U/L      Alkaline  Phosphatase 56 U/L      Total Bilirubin 0.5 mg/dL      eGFR Non African Amer 58 mL/min/1.73      Globulin 3.3 gm/dL      A/G Ratio 1.2 g/dL      BUN/Creatinine Ratio 16.8     Anion Gap 10.0 mmol/L     Narrative:       The MDRD GFR formula is only valid for adults with stable renal function between ages 18 and 70.    CBC & Differential [[id number]] Collected:  09/10/18 1521    Specimen:  Blood Updated:  09/10/18 1530    Narrative:       The following orders were created for panel order CBC & Differential.  Procedure                               Abnormality         Status                     ---------                               -----------         ------                     CBC Auto Differential[[id number]]        Normal              Final result                 Please view results for these tests on the individual orders.    CBC Auto Differential [[id number]]  (Normal) Collected:  09/10/18 1521    Specimen:  Blood Updated:  09/10/18 1530     WBC 6.96 10*3/mm3      RBC 4.57 10*6/mm3      Hemoglobin 13.7 g/dL      Hematocrit 39.5 %      MCV 86.4 fL      MCH 30.0 pg      MCHC 34.7 g/dL      RDW 12.8 %      RDW-SD 40.2 fl      MPV 8.7 fL      Platelets 251 10*3/mm3      Neutrophil % 64.3 %      Lymphocyte % 24.9 %      Monocyte % 7.5 %      Eosinophil % 2.9 %      Basophil % 0.3 %      Immature Grans % 0.1 %      Neutrophils, Absolute 4.48 10*3/mm3      Lymphocytes, Absolute 1.73 10*3/mm3      Monocytes, Absolute 0.52 10*3/mm3      Eosinophils, Absolute 0.20 10*3/mm3      Basophils, Absolute 0.02 10*3/mm3      Immature Grans, Absolute 0.01 10*3/mm3            Imaging Results (last 24 hours)     Procedure Component Value Units Date/Time    XR Chest 1 View [[id number]] Collected:  09/10/18 1527     Updated:  09/10/18 1555    Narrative:         EXAM:         Radiograph(s), Chest   VIEWS:   Frontal  ; 1       DATE/TIME:  9/10/2018 3:42 PM CDT                INDICATION:   chest discomfort, atrial fib    COMPARISON:  CXR:  1/9/17             FINDINGS:             - lines/tubes:    none     - cardiac:         size within normal limits         - mediastinum: contour within normal limits         - lungs:         no focal air space process, pulmonary  interstitial edema, nodule(s)/mass             - pleura:         no evidence of  fluid                  - osseous:         unremarkable for age                  - misc.:         Impression:       CONCLUSION:        1. No evidence of an active cardiopulmonary process.                                                              Electronically signed by:  SUJATA Pruitt MD  9/10/2018 3:54  PM CDT Workstation: [id number]          I reviewed the patient's new clinical results.  I reviewed the patient's new imaging results and agree with the interpretation.  I personally viewed and interpreted the patient's EKG/Telemetry data    ASSESSMENT AND PLAN: This is a 72 y.o. female with:    Active Hospital Problems    Diagnosis Date Noted   • **Atrial fibrillation with RVR (CMS/HCC) [I48.91] 09/10/2018     Priority: High     -Status post IV Lopressor 5mg in the ED  -Will continue IV Cardizem infusion as tolerated by BP; will consider amiodarone  -Will consult cardiology in the morning  -ECHO ordered to evaluate cardiac function  -Monitoring cardiac activity with telemetry  -CHADSVASc: 4 - continue anticoagulation with Xarelto  -Patient was on rhythm control at home: Rythmol TID     • Type 2 diabetes mellitus without complication, without long-term current use of insulin (CMS/HCC) [E11.9] 01/30/2017     Priority: Medium     -Last HbA1c was 6.5% approximately two years ago  -Orders placed for new HbA1c; will recommend that patient follow up with PCP to discuss risks and benefits of continued metformin  -Will hold metformin during IP course  -ADA diet recommended; will consult nutrition/dietary  -SSI provided with regular fingersticks to monitor glucose     • Class 3 obesity due to excess calories with  serious comorbidity and body mass index (BMI) of 45.0 to 49.9 in adult (CMS/[facility]) [E66.09, Z68.42] 09/11/2018     Priority: Low     -Encourage lifestyle modifications such as portion control and regular physical activity  -We'll consult nutrition/dietary to educate patient     • Arthralgia of right hip [M25.551] 09/06/2016     Priority: Low     -Continue Percocet 10mg PO PRN  -[facility] report #[id number] obtained; consistent with patient's prescribed medications       We will continue to monitor the patient's course and adjust our care accordingly.     DVT prophylaxis: Xarelto    Code status is   Code Status and Medical Interventions:   Ordered at: 09/10/18 2005     Level Of Support Discussed With:    Patient     Code Status:    CPR     Medical Interventions (Level of Support Prior to Arrest):    Full      WALLACE # [id number], reviewed and consistent with patient reported medications.    Anticipated length of stay: 1-2 nights    I discussed the patients findings and my recommendations with patient, family and primary care team.     Dr. VOLODYMYR Baptiste is the attending on record at time of admission; she is aware of the patient's status and agrees with the above history and physical.    Signature  Vashti Guzman MD  [facility] Family Medicine Resident, PGYIII        This document has been electronically signed by Vashti Guzman MD on September 10, 2018 11:34 PM

## 2018-09-11 NOTE — NURSING NOTE
Pt B/P low with current Cardizem gtt. Notified Dr Guzman, Ordered to titrate Cardizem down to 2.5 mg/hr. Alos ordered NS at 25 cc/hr to KVO

## 2018-09-11 NOTE — PROGRESS NOTES
FAMILY MEDICINE DAILY PROGRESS NOTE  NAME: Juliana Alcazar  : 1945  MRN: [id number]     LOS: 1 day     PROVIDER OF SERVICE: Shelley Dubose MD    Chief Complaint: Atrial fibrillation with RVR (CMS/HCC)    Subjective:     Interval History:  History taken from: patient  Pt is a 72 y.o. Female who presented to ED yesterday for rapid heart rate for 2-3 days. Her symptoms worsened last night and she came to the ED, where she was found to be in atrial fibrillation with RVR and underlying left bundle branch block.  Her BP at admission was 90/50 mmHg. Pt states she follows a cardiologist in [place] who performed a stress test on her in March which did not show any ischemia. Pt states since last night she is feeling better. She does complain of one episode of feeling her heart beating in her chest when she went to use the bathroom this AM but does not express any other concerns. She states her medication was decreased overnight and that she is not having any troubles with this. She denies any nausea, vomiting, diarrhea, constipation.  Review of Systems:   Review of Systems   Constitutional: Positive for activity change. Negative for chills and fever.   HENT: Negative for ear pain, facial swelling, hearing loss, rhinorrhea, sinus pain, sneezing, sore throat and tinnitus.    Eyes: Negative for discharge and redness.   Respiratory: Negative for cough, chest tightness, shortness of breath and wheezing.    Cardiovascular: Positive for palpitations. Negative for chest pain.   Gastrointestinal: Negative for abdominal pain, constipation, diarrhea, nausea and vomiting.   Genitourinary: Negative for dysuria, frequency and urgency.   Skin: Negative for rash and wound.   Neurological: Negative for tremors, syncope and speech difficulty.   Psychiatric/Behavioral: Negative for agitation, behavioral problems and confusion. The patient is not nervous/anxious.        Objective:     Vital Signs  Temp:  [96 °F (35.6 °C)-97.8 °F  (36.6 °C)] 97.8 °F (36.6 °C)  Heart Rate:  [] 78  Resp:  [18-20] 18  BP: ()/(51-94) 117/63    Physical Exam  Physical Exam   Constitutional: She is oriented to person, place, and time. She appears well-developed and well-nourished. No distress.   HENT:   Head: Normocephalic and atraumatic.   Right Ear: External ear normal.   Left Ear: External ear normal.   Eyes: Conjunctivae and EOM are normal. No scleral icterus.   Neck: Normal range of motion.   Cardiovascular: Normal rate.  An irregularly irregular rhythm present.   Pulmonary/Chest: Effort normal and breath sounds normal. No stridor.   Abdominal: Soft. There is no tenderness.   Musculoskeletal: She exhibits no edema.   Neurological: She is alert and oriented to person, place, and time.   Skin: Skin is warm. She is not diaphoretic.   Psychiatric: She has a normal mood and affect. Her behavior is normal. Thought content normal.       Medication Review    Current Facility-Administered Medications:   •  atorvastatin (LIPITOR) tablet 40 mg, 40 mg, Oral, Nightly, Vashti Guzman MD, 40 mg at 09/10/18 2125  •  dextrose (D50W) 25 g/ 50mL Intravenous Solution 25 g, 25 g, Intravenous, Q15 Min PRN, Vashti Guzman MD  •  dextrose (GLUTOSE) oral gel 15 g, 15 g, Oral, Q15 Min PRN, Vashti Guzman MD  •  glucagon (human recombinant) (GLUCAGEN DIAGNOSTIC) injection 1 mg, 1 mg, Subcutaneous, PRN, Vashti Guzman MD  •  insulin aspart (novoLOG) injection 0-9 Units, 0-9 Units, Subcutaneous, 4x Daily AC & at Bedtime, Vashti Guzman MD  •  ondansetron (ZOFRAN) tablet 4 mg, 4 mg, Oral, Q6H PRN **OR** ondansetron ODT (ZOFRAN-ODT) disintegrating tablet 4 mg, 4 mg, Oral, Q6H PRN **OR** ondansetron (ZOFRAN) injection 4 mg, 4 mg, Intravenous, Q6H PRN, Vashti Guzman MD  •  oxyCODONE-acetaminophen (PERCOCET)  MG per tablet 1 tablet, 1 tablet, Oral, Q6H PRN, Vashti Guzman MD  •  rivaroxaban (XARELTO) tablet 20 mg, 20 mg, Oral,  Daily With Dinner, Vashti Guzman MD  •  sennosides-docusate sodium (SENOKOT-S) 8.6-50 MG tablet 2 tablet, 2 tablet, Oral, Nightly PRN, Vashti Guzman MD  •  sertraline (ZOLOFT) tablet 100 mg, 100 mg, Oral, Daily, Vashti Guzman MD, 100 mg at 09/11/18 0817  •  sodium chloride 0.9 % flush 1-10 mL, 1-10 mL, Intravenous, PRN, Vashti Guzman MD  •  sodium chloride 0.9 % infusion, 25 mL/hr, Intravenous, Continuous, Vashti Guzman MD, Last Rate: 25 mL/hr at 09/11/18 0203, 25 mL/hr at 09/11/18 0203  •  tiZANidine (ZANAFLEX) tablet 4 mg, 4 mg, Oral, Q8H PRN, Vashti Guzman MD     Diagnostic Data    Lab Results (last 24 hours)     Procedure Component Value Units Date/Time    Hemoglobin A1c [[id number]]  (Abnormal) Collected:  09/10/18 1521    Specimen:  Blood Updated:  09/11/18 0839     Hemoglobin A1C 6.2 (H) %     Troponin [[id number]]  (Normal) Collected:  09/11/18 0801    Specimen:  Blood Updated:  09/11/18 0830     Troponin I <0.012 ng/mL     POC Glucose Once [[id number]]  (Normal) Collected:  09/11/18 0632    Specimen:  Blood Updated:  09/11/18 0643     Glucose 123 mg/dL      Comment: Result Not ConfirmedOperator: [id number] [facility] ID: [id number]       Troponin [[id number]]  (Normal) Collected:  09/11/18 0157    Specimen:  Blood Updated:  09/11/18 0317     Troponin I <0.012 ng/mL     Basic Metabolic Panel [[id number]]  (Abnormal) Collected:  09/11/18 0157    Specimen:  Blood Updated:  09/11/18 0306     Glucose 118 (H) mg/dL      BUN 21 mg/dL      Creatinine 0.97 mg/dL      Sodium 140 mmol/L      Potassium 3.9 mmol/L      Chloride 106 mmol/L      CO2 27.0 mmol/L      Calcium 8.7 mg/dL      eGFR Non African Amer 57 mL/min/1.73      BUN/Creatinine Ratio 21.6     Anion Gap 7.0 mmol/L     Narrative:       The MDRD GFR formula is only valid for adults with stable renal function between ages 18 and 70.    CBC & Differential [[id number]] Collected:  09/11/18 0157    Specimen:   Blood Updated:  09/11/18 0252    Narrative:       The following orders were created for panel order CBC & Differential.  Procedure                               Abnormality         Status                     ---------                               -----------         ------                     CBC Auto Differential[[id number]]        Normal              Final result                 Please view results for these tests on the individual orders.    CBC Auto Differential [[id number]]  (Normal) Collected:  09/11/18 0157    Specimen:  Blood Updated:  09/11/18 0252     WBC 8.85 10*3/mm3      RBC 4.26 10*6/mm3      Hemoglobin 12.9 g/dL      Hematocrit 37.5 %      MCV 88.0 fL      MCH 30.3 pg      MCHC 34.4 g/dL      RDW 12.9 %      RDW-SD 41.2 fl      MPV 8.0 fL      Platelets 263 10*3/mm3      Neutrophil % 58.3 %      Lymphocyte % 29.8 %      Monocyte % 8.4 %      Eosinophil % 2.9 %      Basophil % 0.5 %      Immature Grans % 0.1 %      Neutrophils, Absolute 5.16 10*3/mm3      Lymphocytes, Absolute 2.64 10*3/mm3      Monocytes, Absolute 0.74 10*3/mm3      Eosinophils, Absolute 0.26 10*3/mm3      Basophils, Absolute 0.04 10*3/mm3      Immature Grans, Absolute 0.01 10*3/mm3      nRBC 0.0 /100 WBC     Extra Tubes [[id number]] Collected:  09/10/18 2054    Specimen:  Blood from Blood, Venous Line Updated:  09/10/18 2201    Narrative:       The following orders were created for panel order Extra Tubes.  Procedure                               Abnormality         Status                     ---------                               -----------         ------                     Gold Top - SST[[id number]]                                   Final result                 Please view results for these tests on the individual orders.    Gold Top - SST [[id number]] Collected:  09/10/18 2054    Specimen:  Blood Updated:  09/10/18 2201     Extra Tube Hold for add-ons.     Comment: Auto resulted.       Troponin [[id number]]  (Normal) Collected:   09/10/18 2049    Specimen:  Blood Updated:  09/10/18 2131     Troponin I 0.013 ng/mL     TSH [[id number]]  (Normal) Collected:  09/10/18 1521    Specimen:  Blood Updated:  09/10/18 2043     TSH 1.940 mIU/mL     T4, Free [[id number]]  (Normal) Collected:  09/10/18 1521    Specimen:  Blood Updated:  09/10/18 2029     Free T4 1.04 ng/dL     Magnesium [[id number]]  (Normal) Collected:  09/10/18 1521    Specimen:  Blood Updated:  09/10/18 2012     Magnesium 1.9 mg/dL     POC Glucose Once [[id number]]  (Normal) Collected:  09/10/18 1931    Specimen:  Blood Updated:  09/10/18 1948     Glucose 122 mg/dL      Comment: Result Not ConfirmedOperator: [id number] [facility] ID: [id number]       Lipid Panel [[id number]]  (Abnormal) Collected:  09/10/18 1521    Specimen:  Blood Updated:  09/10/18 1945     Total Cholesterol 148 mg/dL      Triglycerides 352 (H) mg/dL      HDL Cholesterol 24 (L) mg/dL      LDL Cholesterol  75 mg/dL      LDL/HDL Ratio 2.23    Urinalysis, Microscopic Only - Urine, Clean Catch [[id number]]  (Abnormal) Collected:  09/10/18 1714    Specimen:  Urine from Urine, Clean Catch Updated:  09/10/18 1753     RBC, UA 3-5 (A) /HPF      WBC, UA 3-5 /HPF      Bacteria, UA 4+ (A) /HPF      Squamous Epithelial Cells, UA 3-5 (A) /HPF      Hyaline Casts, UA None Seen /LPF      Methodology Automated Microscopy    Urinalysis With Microscopic If Indicated (No Culture) - Urine, Clean Catch [[id number]]  (Abnormal) Collected:  09/10/18 1714    Specimen:  Urine from Urine, Clean Catch Updated:  09/10/18 1725     Color, UA Yellow     Appearance, UA Clear     pH, UA 6.5     Specific Alfred, UA 1.024     Comment: Result obtained by Refractometer        Glucose, UA Negative     Ketones, UA Negative     Bilirubin, UA Negative     Blood, UA Moderate (2+) (A)     Protein, UA 30 mg/dL (1+) (A)     Leuk Esterase, UA Trace (A)     Nitrite, UA Negative     Urobilinogen, UA 0.2 E.U./dL    [place] Draw [[id number]] Collected:  09/10/18  1521    Specimen:  Blood Updated:  09/10/18 1632    Narrative:       The following orders were created for panel order [place] Draw.  Procedure                               Abnormality         Status                     ---------                               -----------         ------                     Light Blue Top[[id number]]                                   Final result               Green Top (Gel)[[id number]]                                  Final result               Lavender Top[[id number]]                                     Final result               Gold Top - SST[[id number]]                                   Final result                 Please view results for these tests on the individual orders.    Light Blue Top [[id number]] Collected:  09/10/18 1521    Specimen:  Blood Updated:  09/10/18 1632     Extra Tube hold for add-on     Comment: Auto resulted       Green Top (Gel) [[id number]] Collected:  09/10/18 1521    Specimen:  Blood Updated:  09/10/18 1632     Extra Tube Hold for add-ons.     Comment: Auto resulted.       Lavender Top [[id number]] Collected:  09/10/18 1521    Specimen:  Blood Updated:  09/10/18 1632     Extra Tube hold for add-on     Comment: Auto resulted       Gold Top - SST [[id number]] Collected:  09/10/18 1521    Specimen:  Blood Updated:  09/10/18 1632     Extra Tube Hold for add-ons.     Comment: Auto resulted.       BNP [[id number]]  (Abnormal) Collected:  09/10/18 1521    Specimen:  Blood Updated:  09/10/18 1602     proBNP 1,370.0 (H) pg/mL     Troponin [[id number]]  (Normal) Collected:  09/10/18 1521    Specimen:  Blood Updated:  09/10/18 1602     Troponin I <0.012 ng/mL     Comprehensive Metabolic Panel [[id number]]  (Abnormal) Collected:  09/10/18 1521    Specimen:  Blood Updated:  09/10/18 1551     Glucose 129 (H) mg/dL      BUN 16 mg/dL      Creatinine 0.95 mg/dL      Sodium 142 mmol/L      Potassium 4.1 mmol/L      Chloride 105 mmol/L      CO2 27.0 mmol/L      Calcium 9.1 mg/dL       Total Protein 7.1 g/dL      Albumin 3.80 g/dL      ALT (SGPT) 34 U/L      AST (SGOT) 28 U/L      Alkaline Phosphatase 56 U/L      Total Bilirubin 0.5 mg/dL      eGFR Non African Amer 58 mL/min/1.73      Globulin 3.3 gm/dL      A/G Ratio 1.2 g/dL      BUN/Creatinine Ratio 16.8     Anion Gap 10.0 mmol/L     Narrative:       The MDRD GFR formula is only valid for adults with stable renal function between ages 18 and 70.    CBC & Differential [[id number]] Collected:  09/10/18 1521    Specimen:  Blood Updated:  09/10/18 1530    Narrative:       The following orders were created for panel order CBC & Differential.  Procedure                               Abnormality         Status                     ---------                               -----------         ------                     CBC Auto Differential[[id number]]        Normal              Final result                 Please view results for these tests on the individual orders.    CBC Auto Differential [[id number]]  (Normal) Collected:  09/10/18 1521    Specimen:  Blood Updated:  09/10/18 1530     WBC 6.96 10*3/mm3      RBC 4.57 10*6/mm3      Hemoglobin 13.7 g/dL      Hematocrit 39.5 %      MCV 86.4 fL      MCH 30.0 pg      MCHC 34.7 g/dL      RDW 12.8 %      RDW-SD 40.2 fl      MPV 8.7 fL      Platelets 251 10*3/mm3      Neutrophil % 64.3 %      Lymphocyte % 24.9 %      Monocyte % 7.5 %      Eosinophil % 2.9 %      Basophil % 0.3 %      Immature Grans % 0.1 %      Neutrophils, Absolute 4.48 10*3/mm3      Lymphocytes, Absolute 1.73 10*3/mm3      Monocytes, Absolute 0.52 10*3/mm3      Eosinophils, Absolute 0.20 10*3/mm3      Basophils, Absolute 0.02 10*3/mm3      Immature Grans, Absolute 0.01 10*3/mm3            Imaging Results (last 24 hours)     Procedure Component Value Units Date/Time    XR Chest 1 View [[id number]] Collected:  09/10/18 1527     Updated:  09/10/18 1555    Narrative:         EXAM:         Radiograph(s), Chest   VIEWS:   Frontal  ; 1        DATE/TIME:  9/10/2018 3:42 PM CDT                INDICATION:   chest discomfort, atrial fib    COMPARISON:  CXR: 1/9/17             FINDINGS:             - lines/tubes:    none     - cardiac:         size within normal limits         - mediastinum: contour within normal limits         - lungs:         no focal air space process, pulmonary  interstitial edema, nodule(s)/mass             - pleura:         no evidence of  fluid                  - osseous:         unremarkable for age                  - misc.:         Impression:       CONCLUSION:        1. No evidence of an active cardiopulmonary process.                                                              Electronically signed by:  SUJATA Pruitt MD  9/10/2018 3:54  PM CDT Workstation: [id number]          I reviewed the patient's new clinical results.    Assessment/Plan:     Hospital Problem List     * (Principal)Atrial fibrillation with RVR (CMS/[facility])    Overview Addendum 9/11/2018  9:12 AM by Shelley Dubose MD     -Will continue IV Cardizem infusion as tolerated by BP; will consider amiodarone  -Will consult cardiology in the morning  -ECHO ordered to evaluate cardiac function  -Monitoring cardiac activity with telemetry  -CHADSVASc: 4 - continue anticoagulation with Xarelto  -Patient was on rhythm control at home: Rythmol TID  -TSH and Mg+2 WNL         Arthralgia of right hip    Overview Addendum 9/11/2018 12:09 AM by Vashti Guzman MD     -Continue Percocet 10mg PO PRN  -[facility] report #[id number] obtained; consistent with patient's prescribed medications         Type 2 diabetes mellitus without complication, without long-term current use of insulin (CMS/[facility])    Overview Signed 9/10/2018  8:23 PM by Vashti Guzman MD     -Last HbA1c was 6.5% approximately two years ago  -Orders placed for new HbA1c; will recommend that patient follow up with PCP to discuss risks and benefits of continued metformin  -Will hold metformin during IP  course  -ADA diet recommended; will consult nutrition/dietary  -SSI provided with regular fingersticks to monitor glucose         Class 3 obesity due to excess calories with serious comorbidity and body mass index (BMI) of 45.0 to 49.9 in adult (CMS/[facility])    Overview Signed 9/11/2018 12:11 AM by Vashti Guzman MD     -Encourage lifestyle modifications such as portion control and regular physical activity  -We'll consult nutrition/dietary to educate patient         Polypharmacy            DVT prophylaxis: SCDs/TEDs  Code Status and Medical Interventions:   Ordered at: 09/10/18 2005     Level Of Support Discussed With:    Patient     Code Status:    CPR     Medical Interventions (Level of Support Prior to Arrest):    Full       Plan for disposition:Where: home and When:  symptoms improve      Time: 15 minutes

## 2018-09-11 NOTE — PLAN OF CARE
Problem: Patient Care Overview  Goal: Individualization and Mutuality  Outcome: Ongoing (interventions implemented as appropriate)    Goal: Discharge Needs Assessment  Outcome: Ongoing (interventions implemented as appropriate)      Problem: Arrhythmia/Dysrhythmia (Symptomatic) (Adult)  Goal: Signs and Symptoms of Listed Potential Problems Will be Absent, Minimized or Managed (Arrhythmia/Dysrhythmia)  Outcome: Ongoing (interventions implemented as appropriate)      Problem: Pain, Chronic (Adult)  Goal: Identify Related Risk Factors and Signs and Symptoms  Outcome: Outcome(s) achieved Date Met: 09/11/18    Goal: Acceptable Pain/Comfort Level and Functional Ability  Outcome: Ongoing (interventions implemented as appropriate)

## 2018-09-11 NOTE — PLAN OF CARE
Problem: Patient Care Overview  Goal: Discharge Needs Assessment  Outcome: Ongoing (interventions implemented as appropriate)      Problem: Arrhythmia/Dysrhythmia (Symptomatic) (Adult)  Goal: Signs and Symptoms of Listed Potential Problems Will be Absent, Minimized or Managed (Arrhythmia/Dysrhythmia)  Outcome: Ongoing (interventions implemented as appropriate)

## 2018-09-11 NOTE — CONSULTS
Adult Nutrition  Assessment    Patient Name:  Juliana Alcazar  YOB: 1945  MRN: [id number]  Admit Date:  9/10/2018    Assessment Date:  9/11/2018    Comments:  Pt reporets good appetite.  Reports 1-2 lb wt loss the past couple of days due to illness/decreased intake.  Inake 50% - 1x.  Blood glucose, HgbA1C are elevated.  Sliding scale novolog prescribed.  Consult received regarding diet ed.  BMI 45 with pt at 222% IBW.  Making Lifestyle Choices for a Healthier Weight and Basic ADA Diet Guidelines used to provide ed regarding Wt Loss ADA Diet/Carbohydrate Counting and diet copies given.          Reason for Assessment     Row Name 09/11/18 5535          Reason for Assessment    Reason For Assessment per organizational policy;physician consult   Wt Loss ADA/Carbohydrate Counting diet ed     Diagnosis diabetes diagnosis/complications;other (see comments)   dx Morbid Obesity     Identified At Risk by Screening Criteria BMI;need for education                   Labs/Tests/Procedures/Meds     Row Name 09/11/18 4524          Labs/Procedures/Meds    Lab Results Reviewed reviewed, pertinent        Medications    Pertinent Medications Reviewed reviewed, pertinent               Estimated/Assessed Needs     Row Name 09/11/18 4465          Calculation Measurements    Weight Used For Calculations 71.2 kg (156 lb 15.5 oz)        Estimated/Assessed Needs    Additional Documentation Calorie Requirements (Group);Latimer-St. Jeor Equation (Group);Fluid Requirements (Group);Protein Requirements (Group)        Calorie Requirements    Estimated Calorie Requirement (kcal/day) 1575     Estimated Calorie Need Method Latimer-St Jeor        KCAL/KG    14 Kcal/Kg (kcal) 996.8     15 Kcal/Kg (kcal) 1068     18 Kcal/Kg (kcal) 1281.6     20 Kcal/Kg (kcal) 1424     25 Kcal/Kg (kcal) 1780     30 Kcal/Kg (kcal) 2136     35 Kcal/Kg (kcal) 2492     40 Kcal/Kg (kcal) 2848     45 Kcal/Kg (kcal) 3204     50 Kcal/Kg (kcal) 3560         [place]-St. Jeor Equation    RMR ([place]-St. Jeor Equation) 1212        Protein Requirements    Est Protein Requirement Amount (gms/kg) 1.2 gm protein     Estimated Protein Requirements (gms/day) 85.44        Fluid Requirements    Estimated Fluid Requirements (mL/day) 1781     RDA Method (mL) 1781     Guillermina-Seth Method (over 20 kg) 2924             Nutrition Prescription Ordered     Row Name 09/11/18 1701          Nutrition Prescription PO    Current PO Diet Regular     Common Modifiers Consistent Carbohydrate             Evaluation of Received Nutrient/Fluid Intake     Row Name 09/11/18 1701 09/11/18 1659       Calculation Measurements    Weight Used For Calculations  -- 71.2 kg (156 lb 15.5 oz)       PO Evaluation    Number of Meals 1  --    % PO Intake 50  --            Evaluation of Prescribed Nutrient/Fluid Intake     Row Name 09/11/18 1659          Calculation Measurements    Weight Used For Calculations 71.2 kg (156 lb 15.5 oz)           Electronically signed by:  Lorena Grossman RD  09/11/18 5:02 PM

## 2018-09-11 NOTE — CONSULTS
CARDIOLOGY CONSULTATION NOTE    Referring Provider: Cameron Mae DO/ Family medicine service    Reason for Consultation: Atrial  fibrillation with rapid ventricular response    Juliana Alcazar  1945  72 y.o. female      HPI    Juliana Alcazar is a 72 y.o.  female with a history of paroxysmal atrial fibrillation, hypertension, hyperlipidemia, obesity, sleep apnea who presented to [facility] with episodes of palpitation, rapid heart rate which has been going on for 48-72 hours.  Her symptoms got worse last night and hence was brought to the ER in [place].  She was found to be in atrial fibrillation with RVR with underlying left bundle branch block.  Her initial blood pressure was low at 90/50 mmHg.  She was initially rate controlled with 5 mg of IV Lopressor and subsequently IV Cardizem was started at a very low-dose of  2.5-5 mg/ hr.  At the time of my examination her blood pressure was 95/60 and heart rate 65-90 bpm.       Patient has a past medical history of pulmonary vein isolation in December 2013 and cardioversion in March 2018.  Her cardiologist is Dr. Juan Nielsen at [facility] in [place], [place].  Apparently she had a stress Cardiolite in March this year which was negative for myocardial ischemia.  Following cardioversion, she was started on Rythmol.  The patient has had a difficult time taking the medication 3 times a day.      The patient did have some degree of chest pressure, shortness of breath and mild diaphoresis.  No paris chest pain.  Cardiac enzymes have been negative for myocardial injury.    SUBJECTIVE    Past Medical History:   Diagnosis Date   • Acute sinusitis    • Anorectal fistula     Anorectal fistula - status post repair      • Atrial fibrillation (CMS/HCC)    • Backache    • Carpal tunnel syndrome    • Chest pain, non-cardiac    • Degenerative joint disease involving multiple joints    • Depressive disorder    • Diarrhea    • Dyspnea     • Electrocardiogram abnormal    • Essential hypertension    • Female stress incontinence    • Generalized anxiety disorder    • GERD (gastroesophageal reflux disease)    • H/O tubal ligation    • Hemorrhoids    • Hypercholesterolemia    • Hyperlipidemia    • Hypertensive disorder    • Low back pain     C/O - low back pain      • Normal gynecologic examination    • Obesity    • Otitis media, left    • Palpitations     a   • Primary fibromyalgia syndrome    • Sleep apnea    • Tachycardia    • Type 2 diabetes mellitus (CMS/HCC)          Past Surgical History:   Procedure Laterality Date   • BARIATRIC SURGERY     • BREAST BIOPSY     • CARDIAC ABLATION     • COLONOSCOPY N/A 3/31/2017    Procedure: COLONOSCOPY;  Surgeon: Pan Muller MD;  Location: [facility] ENDOSCOPY;  Service:    • ENDOSCOPY N/A 3/31/2017    Procedure: ESOPHAGOGASTRODUODENOSCOPY dilation;  Surgeon: Pan Mluler MD;  Location: [facility] ENDOSCOPY;  Service:    • ENDOSCOPY AND COLONOSCOPY  09/27/2000     A single small sessile polyp was seen in the rectum which measured 1 mm.211.3 External hemorrhoids were present. 455.3    • INJECTION OF MEDICATION  01/07/2016    Celestone (betamethasone) (2)        • JOINT REPLACEMENT      right knee 2008   • KNEE ARTHROSCOPY  07/09/2008     Right total knee atthroplasty. Osteoarthritis of the right knee.    • STOMACH SURGERY  1976    Revise stomach-bowel fusion (1)    history of jejunal surgery    • TONSILLECTOMY     • TUBAL ABDOMINAL LIGATION     • UPPER GASTROINTESTINAL ENDOSCOPY  03/31/2017         Family History   Problem Relation Age of Onset   • Hyperlipidemia Mother    • Hypertension Mother    • Lung cancer Mother    • Hyperlipidemia Father    • Hypertension Father    • Lung cancer Sister    • Gallbladder disease Maternal Grandmother    • Heart disease Paternal Grandmother    • Diabetes Paternal Grandfather          Social History     Social History   • Marital status:      Spouse name: N/A   • Number  of children: N/A   • Years of education: N/A     Occupational History   • Not on file.     Social History Main Topics   • Smoking status: Never Smoker   • Smokeless tobacco: Never Used   • Alcohol use No   • Drug use: No   • Sexual activity: Not Currently     Partners: Male     Other Topics Concern   • Not on file     Social History Narrative   • No narrative on file         Allergies   Allergen Reactions   • Adhesive Tape    • Celebrex [Celecoxib]    • Darvon [Propoxyphene]    • Demerol [Meperidine]    • Dilantin [Phenytoin]    • Dilaudid [Hydromorphone Hcl]    • Iodinated Diagnostic Agents    • Latex    • Morphine And Related    • Nsaids    • Penicillins Swelling     Patient had a reaction to penicillin in 2010.  Reaction included hives and swelling.  Patient states she has tolerated cephalexin in the past.   • Phenergan [Promethazine]    • Vioxx [Rofecoxib]    • Vistaril [Hydroxyzine Hcl]          Current Facility-Administered Medications   Medication Dose Route Frequency Provider Last Rate Last Dose   • atorvastatin (LIPITOR) tablet 40 mg  40 mg Oral Nightly Vashti Guzman MD   40 mg at 09/10/18 2125   • dextrose (D50W) 25 g/ 50mL Intravenous Solution 25 g  25 g Intravenous Q15 Min PRN Vashti Guzman MD       • dextrose (GLUTOSE) oral gel 15 g  15 g Oral Q15 Min PRN Vashti Guzman MD       • glucagon (human recombinant) (GLUCAGEN DIAGNOSTIC) injection 1 mg  1 mg Subcutaneous PRN Vashti Guzman MD       • insulin aspart (novoLOG) injection 0-9 Units  0-9 Units Subcutaneous 4x Daily AC & at Bedtime Vashti Guzman MD       • ondansetron (ZOFRAN) tablet 4 mg  4 mg Oral Q6H PRN Vashti Guzman MD        Or   • ondansetron ODT (ZOFRAN-ODT) disintegrating tablet 4 mg  4 mg Oral Q6H PRN Vashti Guzman MD        Or   • ondansetron (ZOFRAN) injection 4 mg  4 mg Intravenous Q6H PRN Vashti Guzman MD       • oxyCODONE-acetaminophen (PERCOCET)  MG per tablet 1  "tablet  1 tablet Oral Q6H PRN Vashti Guzman MD       • rivaroxaban (XARELTO) tablet 20 mg  20 mg Oral Daily With Dinner Vashti Guzman MD       • sennosides-docusate sodium (SENOKOT-S) 8.6-50 MG tablet 2 tablet  2 tablet Oral Nightly PRN Vashti Guzman MD       • sertraline (ZOLOFT) tablet 100 mg  100 mg Oral Daily Vashti Guzman MD       • sodium chloride 0.9 % flush 1-10 mL  1-10 mL Intravenous PRN Vashti Guzman MD       • sodium chloride 0.9 % infusion  25 mL/hr Intravenous Continuous Vashti Guzman MD 25 mL/hr at 09/11/18 0203 25 mL/hr at 09/11/18 0203   • tiZANidine (ZANAFLEX) tablet 4 mg  4 mg Oral Q8H PRN Vashti Guzman MD             OBJECTIVE    /68 (BP Location: Left arm, Patient Position: Lying)   Pulse 91   Temp 97.6 °F (36.4 °C) (Oral)   Resp 18   Ht 162.6 cm (64\")   Wt 121 kg (267 lb 6.4 oz)   SpO2 94%   BMI 45.90 kg/m²       Review of Systems     Constitutional:  Denies recent weight loss, weight gain, fever or chills     HENT:  Denies any hearing loss, epistaxis, hoarseness, or difficulty speaking.     Eyes: Wears eyeglasses or contact lenses     Respiratory:  Dyspnea with exertion,no cough, wheezing, or hemoptysis.     Cardiovascular: See history of present illness    Gastrointestinal:  Denies change in bowel habits, dyspepsia, ulcer disease, hematochezia, or melena.     Endocrine: Negative for cold intolerance, heat intolerance, polydipsia, polyphagia and polyuria.     Genitourinary: Negative.      Musculoskeletal: Denies any history of arthritic symptoms or back problems     Skin:  Denies any change in hair or nails, rashes, or skin lesions.     Allergic/Immunologic: Negative.  Negative for environmental allergies, food allergies and immunocompromised state.     Neurological:  Denies any history of recurrent headaches, strokes, TIA, or seizure disorder.     Hematological: Denies any food allergies, seasonal allergies, bleeding " disorders, or lymphadenopathy.     Psychiatric/Behavioral: Denies any history of depression, substance abuse, or change in cognitive function.       Physical Exam     Constitutional: Cooperative, alert and oriented, well-developed, well-nourished, in no acute distress.     HENT:   Head: Normocephalic, normal hair patterns, no masses or tenderness.  Ears, Nose, and Throat: No gross abnormalities. No pallor or cyanosis.  Eyes: EOMS intact, PERRL, conjunctivae and lids unremarkable. Fundoscopic exam and visual fields not performed.   Neck: No palpable masses or adenopathy, no thyromegaly, no JVD, carotid pulses are full and equal bilaterally and without  Bruits.     Cardiovascular: Irregular rhythm, S1 variable, S2 normal, no S3 or S4.  No murmurs, gallops, or rubs detected.     Pulmonary/Chest: Chest: Increased AP diameter of the chest, normal respiratory excursion, no intercostal retraction, no use of accessory muscles. Pulmonary: Normal breath sounds - no rales or rhonchi.    Abdominal: Abdomen soft, bowel sounds normoactive, no masses, no hepatosplenomegaly, non-tender, no bruits.     Musculoskeletal: No deformities, clubbing, cyanosis, erythema, or edema observed.     Neurological: No gross motor or sensory deficits noted, Cranial nerves 2-12 normal. affect appropriate, oriented to time, person, place.     Skin: Warm and dry to the touch, no apparent skin lesions or masses noted.     Psychiatric: Normal mood and affect. Behavior is normal. Judgment and thought content normal.     RESULTS  Lab Results (last 24 hours)     Procedure Component Value Units Date/Time    Troponin [[id number]] Collected:  09/11/18 0801    Specimen:  Blood Updated:  09/11/18 0805    POC Glucose Once [[id number]]  (Normal) Collected:  09/11/18 0632    Specimen:  Blood Updated:  09/11/18 0643     Glucose 123 mg/dL      Comment: Result Not ConfirmedOperator: [id number] HOMAJANE Saucedo ID: [id number]       Troponin [[id number]]  (Normal)  Collected:  09/11/18 0157    Specimen:  Blood Updated:  09/11/18 0317     Troponin I <0.012 ng/mL     Basic Metabolic Panel [[id number]]  (Abnormal) Collected:  09/11/18 0157    Specimen:  Blood Updated:  09/11/18 0306     Glucose 118 (H) mg/dL      BUN 21 mg/dL      Creatinine 0.97 mg/dL      Sodium 140 mmol/L      Potassium 3.9 mmol/L      Chloride 106 mmol/L      CO2 27.0 mmol/L      Calcium 8.7 mg/dL      eGFR Non African Amer 57 mL/min/1.73      BUN/Creatinine Ratio 21.6     Anion Gap 7.0 mmol/L     Narrative:       The MDRD GFR formula is only valid for adults with stable renal function between ages 18 and 70.    CBC & Differential [[id number]] Collected:  09/11/18 0157    Specimen:  Blood Updated:  09/11/18 0252    Narrative:       The following orders were created for panel order CBC & Differential.  Procedure                               Abnormality         Status                     ---------                               -----------         ------                     CBC Auto Differential[[id number]]        Normal              Final result                 Please view results for these tests on the individual orders.    CBC Auto Differential [[id number]]  (Normal) Collected:  09/11/18 0157    Specimen:  Blood Updated:  09/11/18 0252     WBC 8.85 10*3/mm3      RBC 4.26 10*6/mm3      Hemoglobin 12.9 g/dL      Hematocrit 37.5 %      MCV 88.0 fL      MCH 30.3 pg      MCHC 34.4 g/dL      RDW 12.9 %      RDW-SD 41.2 fl      MPV 8.0 fL      Platelets 263 10*3/mm3      Neutrophil % 58.3 %      Lymphocyte % 29.8 %      Monocyte % 8.4 %      Eosinophil % 2.9 %      Basophil % 0.5 %      Immature Grans % 0.1 %      Neutrophils, Absolute 5.16 10*3/mm3      Lymphocytes, Absolute 2.64 10*3/mm3      Monocytes, Absolute 0.74 10*3/mm3      Eosinophils, Absolute 0.26 10*3/mm3      Basophils, Absolute 0.04 10*3/mm3      Immature Grans, Absolute 0.01 10*3/mm3      nRBC 0.0 /100 WBC     Extra Tubes [[id number]] Collected:   09/10/18 2054    Specimen:  Blood from Blood, Venous Line Updated:  09/10/18 2201    Narrative:       The following orders were created for panel order Extra Tubes.  Procedure                               Abnormality         Status                     ---------                               -----------         ------                     Gold Top - SST[[id number]]                                   Final result                 Please view results for these tests on the individual orders.    Gold Top - SST [[id number]] Collected:  09/10/18 2054    Specimen:  Blood Updated:  09/10/18 2201     Extra Tube Hold for add-ons.     Comment: Auto resulted.       Troponin [[id number]]  (Normal) Collected:  09/10/18 2049    Specimen:  Blood Updated:  09/10/18 2131     Troponin I 0.013 ng/mL     TSH [[id number]]  (Normal) Collected:  09/10/18 1521    Specimen:  Blood Updated:  09/10/18 2043     TSH 1.940 mIU/mL     Hemoglobin A1c [[id number]] Collected:  09/10/18 1521    Specimen:  Blood Updated:  09/10/18 2034    T4, Free [[id number]]  (Normal) Collected:  09/10/18 1521    Specimen:  Blood Updated:  09/10/18 2029     Free T4 1.04 ng/dL     Magnesium [[id number]]  (Normal) Collected:  09/10/18 1521    Specimen:  Blood Updated:  09/10/18 2012     Magnesium 1.9 mg/dL     POC Glucose Once [[id number]]  (Normal) Collected:  09/10/18 1931    Specimen:  Blood Updated:  09/10/18 1948     Glucose 122 mg/dL      Comment: Result Not ConfirmedOperator: [id number] [facility] ID: [id number]       Lipid Panel [[id number]]  (Abnormal) Collected:  09/10/18 1521    Specimen:  Blood Updated:  09/10/18 1945     Total Cholesterol 148 mg/dL      Triglycerides 352 (H) mg/dL      HDL Cholesterol 24 (L) mg/dL      LDL Cholesterol  75 mg/dL      LDL/HDL Ratio 2.23    Urinalysis, Microscopic Only - Urine, Clean Catch [[id number]]  (Abnormal) Collected:  09/10/18 1714    Specimen:  Urine from Urine, Clean Catch Updated:  09/10/18 1753     RBC, UA 3-5 (A)  /HPF      WBC, UA 3-5 /HPF      Bacteria, UA 4+ (A) /HPF      Squamous Epithelial Cells, UA 3-5 (A) /HPF      Hyaline Casts, UA None Seen /LPF      Methodology Automated Microscopy    Urinalysis With Microscopic If Indicated (No Culture) - Urine, Clean Catch [[id number]]  (Abnormal) Collected:  09/10/18 1714    Specimen:  Urine from Urine, Clean Catch Updated:  09/10/18 1725     Color, UA Yellow     Appearance, UA Clear     pH, UA 6.5     Specific [place], UA 1.024     Comment: Result obtained by Refractometer        Glucose, UA Negative     Ketones, UA Negative     Bilirubin, UA Negative     Blood, UA Moderate (2+) (A)     Protein, UA 30 mg/dL (1+) (A)     Leuk Esterase, UA Trace (A)     Nitrite, UA Negative     Urobilinogen, UA 0.2 E.U./dL    [place] Draw [[id number]] Collected:  09/10/18 1521    Specimen:  Blood Updated:  09/10/18 1632    Narrative:       The following orders were created for panel order [place] Draw.  Procedure                               Abnormality         Status                     ---------                               -----------         ------                     Light Blue Top[[id number]]                                   Final result               Green Top (Gel)[[id number]]                                  Final result               Lavender Top[[id number]]                                     Final result               Gold Top - SST[[id number]]                                   Final result                 Please view results for these tests on the individual orders.    Light Blue Top [[id number]] Collected:  09/10/18 1521    Specimen:  Blood Updated:  09/10/18 1632     Extra Tube hold for add-on     Comment: Auto resulted       Green Top (Gel) [[id number]] Collected:  09/10/18 1521    Specimen:  Blood Updated:  09/10/18 1632     Extra Tube Hold for add-ons.     Comment: Auto resulted.       Lavender Top [[id number]] Collected:  09/10/18 1521    Specimen:  Blood Updated:  09/10/18 1632      Extra Tube hold for add-on     Comment: Auto resulted       Gold Top - SST [[id number]] Collected:  09/10/18 1521    Specimen:  Blood Updated:  09/10/18 1632     Extra Tube Hold for add-ons.     Comment: Auto resulted.       BNP [[id number]]  (Abnormal) Collected:  09/10/18 1521    Specimen:  Blood Updated:  09/10/18 1602     proBNP 1,370.0 (H) pg/mL     Troponin [[id number]]  (Normal) Collected:  09/10/18 1521    Specimen:  Blood Updated:  09/10/18 1602     Troponin I <0.012 ng/mL     Comprehensive Metabolic Panel [[id number]]  (Abnormal) Collected:  09/10/18 1521    Specimen:  Blood Updated:  09/10/18 1551     Glucose 129 (H) mg/dL      BUN 16 mg/dL      Creatinine 0.95 mg/dL      Sodium 142 mmol/L      Potassium 4.1 mmol/L      Chloride 105 mmol/L      CO2 27.0 mmol/L      Calcium 9.1 mg/dL      Total Protein 7.1 g/dL      Albumin 3.80 g/dL      ALT (SGPT) 34 U/L      AST (SGOT) 28 U/L      Alkaline Phosphatase 56 U/L      Total Bilirubin 0.5 mg/dL      eGFR Non African Amer 58 mL/min/1.73      Globulin 3.3 gm/dL      A/G Ratio 1.2 g/dL      BUN/Creatinine Ratio 16.8     Anion Gap 10.0 mmol/L     Narrative:       The MDRD GFR formula is only valid for adults with stable renal function between ages 18 and 70.    CBC & Differential [[id number]] Collected:  09/10/18 1521    Specimen:  Blood Updated:  09/10/18 1530    Narrative:       The following orders were created for panel order CBC & Differential.  Procedure                               Abnormality         Status                     ---------                               -----------         ------                     CBC Auto Differential[[id number]]        Normal              Final result                 Please view results for these tests on the individual orders.    CBC Auto Differential [[id number]]  (Normal) Collected:  09/10/18 1521    Specimen:  Blood Updated:  09/10/18 1530     WBC 6.96 10*3/mm3      RBC 4.57 10*6/mm3      Hemoglobin 13.7 g/dL       Hematocrit 39.5 %      MCV 86.4 fL      MCH 30.0 pg      MCHC 34.7 g/dL      RDW 12.8 %      RDW-SD 40.2 fl      MPV 8.7 fL      Platelets 251 10*3/mm3      Neutrophil % 64.3 %      Lymphocyte % 24.9 %      Monocyte % 7.5 %      Eosinophil % 2.9 %      Basophil % 0.3 %      Immature Grans % 0.1 %      Neutrophils, Absolute 4.48 10*3/mm3      Lymphocytes, Absolute 1.73 10*3/mm3      Monocytes, Absolute 0.52 10*3/mm3      Eosinophils, Absolute 0.20 10*3/mm3      Basophils, Absolute 0.02 10*3/mm3      Immature Grans, Absolute 0.01 10*3/mm3         Imaging Results (last 24 hours)     Procedure Component Value Units Date/Time    XR Chest 1 View [[id number]] Collected:  09/10/18 1527     Updated:  09/10/18 1555    Narrative:         EXAM:         Radiograph(s), Chest   VIEWS:   Frontal  ; 1       DATE/TIME:  9/10/2018 3:42 PM CDT                INDICATION:   chest discomfort, atrial fib    COMPARISON:  CXR: 1/9/17             FINDINGS:             - lines/tubes:    none     - cardiac:         size within normal limits         - mediastinum: contour within normal limits         - lungs:         no focal air space process, pulmonary  interstitial edema, nodule(s)/mass             - pleura:         no evidence of  fluid                  - osseous:         unremarkable for age                  - misc.:         Impression:       CONCLUSION:        1. No evidence of an active cardiopulmonary process.                                                              Electronically signed by:  SUJATA Pruitt MD  9/10/2018 3:54  PM CDT Workstation: [id number]            ASSESSMENT AND PLAN    Mrs. Alcazar 72-year-old  lady with multiple medical issues including hypertension, hyperlipidemia, obesity, sleep apnea on CPAP, and paroxysmal atrial fibrillation status post ablation about 5 years prior.  She's had a reoccurrence of atrial fibrillation, in spite of being on antiarrhythmic therapy with Rythmol and anticoagulation with  Xarelto.   Atrial fibrillation most likely secondary to obesity, sleep apnea, hypertension, diastolic dysfunction.  Suspicion for ongoing ischemia is low.  I discussed the different treatment options with her in detail and have recommended elective cardioversion since she has been on anticoagulation since March this year.  I believe that pulmonary vein isolation for the second time would be reasonable in view of the reoccurrence of symptoms associated with hypotension.  I will consider starting her on amiodarone post-cardioversion.  She has been on sotalol in the past and has failed both sotalol and Rythmol.  The patient wants to consider her options and get back with me.  All risks and benefits of cardioversion have been explained to her and she will be ASA 2 and Mallampati 2.  Echocardiogram ordered will be reviewed.  Thank you for asking me to see this patient.    Lillie Velez MD  9/11/2018  8:14 AM

## 2018-09-12 LAB
ANION GAP SERPL CALCULATED.3IONS-SCNC: 8 MMOL/L (ref 5–15)
BASOPHILS # BLD AUTO: 0.02 10*3/MM3 (ref 0–0.2)
BASOPHILS NFR BLD AUTO: 0.3 % (ref 0–2)
BUN BLD-MCNC: 14 MG/DL (ref 7–21)
BUN/CREAT SERPL: 17.5 (ref 7–25)
CALCIUM SPEC-SCNC: 8.6 MG/DL (ref 8.4–10.2)
CHLORIDE SERPL-SCNC: 105 MMOL/L (ref 95–110)
CO2 SERPL-SCNC: 28 MMOL/L (ref 22–31)
CREAT BLD-MCNC: 0.8 MG/DL (ref 0.5–1)
DEPRECATED RDW RBC AUTO: 41 FL (ref 36.4–46.3)
EOSINOPHIL # BLD AUTO: 0.24 10*3/MM3 (ref 0–0.7)
EOSINOPHIL NFR BLD AUTO: 3.6 % (ref 0–7)
ERYTHROCYTE [DISTWIDTH] IN BLOOD BY AUTOMATED COUNT: 12.7 % (ref 11.5–14.5)
GFR SERPL CREATININE-BSD FRML MDRD: 71 ML/MIN/1.73 (ref 39–90)
GLUCOSE BLD-MCNC: 122 MG/DL (ref 60–100)
GLUCOSE BLDC GLUCOMTR-MCNC: 112 MG/DL (ref 70–130)
GLUCOSE BLDC GLUCOMTR-MCNC: 116 MG/DL (ref 70–130)
GLUCOSE BLDC GLUCOMTR-MCNC: 122 MG/DL (ref 70–130)
GLUCOSE BLDC GLUCOMTR-MCNC: 122 MG/DL (ref 70–130)
HCT VFR BLD AUTO: 35.9 % (ref 35–45)
HGB BLD-MCNC: 12.3 G/DL (ref 12–15.5)
IMM GRANULOCYTES # BLD: 0 10*3/MM3 (ref 0–0.02)
IMM GRANULOCYTES NFR BLD: 0 % (ref 0–0.5)
LYMPHOCYTES # BLD AUTO: 2.22 10*3/MM3 (ref 0.6–4.2)
LYMPHOCYTES NFR BLD AUTO: 33.5 % (ref 10–50)
MCH RBC QN AUTO: 29.9 PG (ref 26.5–34)
MCHC RBC AUTO-ENTMCNC: 34.3 G/DL (ref 31.4–36)
MCV RBC AUTO: 87.1 FL (ref 80–98)
MONOCYTES # BLD AUTO: 0.4 10*3/MM3 (ref 0–0.9)
MONOCYTES NFR BLD AUTO: 6 % (ref 0–12)
NEUTROPHILS # BLD AUTO: 3.74 10*3/MM3 (ref 2–8.6)
NEUTROPHILS NFR BLD AUTO: 56.6 % (ref 37–80)
PLATELET # BLD AUTO: 234 10*3/MM3 (ref 150–450)
PMV BLD AUTO: 8.6 FL (ref 8–12)
POTASSIUM BLD-SCNC: 3.7 MMOL/L (ref 3.5–5.1)
RBC # BLD AUTO: 4.12 10*6/MM3 (ref 3.77–5.16)
SODIUM BLD-SCNC: 141 MMOL/L (ref 137–145)
WBC NRBC COR # BLD: 6.62 10*3/MM3 (ref 3.2–9.8)

## 2018-09-12 PROCEDURE — 99232 SBSQ HOSP IP/OBS MODERATE 35: CPT | Performed by: INTERNAL MEDICINE

## 2018-09-12 PROCEDURE — 99232 SBSQ HOSP IP/OBS MODERATE 35: CPT | Performed by: STUDENT IN AN ORGANIZED HEALTH CARE EDUCATION/TRAINING PROGRAM

## 2018-09-12 PROCEDURE — 85025 COMPLETE CBC W/AUTO DIFF WBC: CPT | Performed by: FAMILY MEDICINE

## 2018-09-12 PROCEDURE — 25010000002 ONDANSETRON PER 1 MG: Performed by: FAMILY MEDICINE

## 2018-09-12 PROCEDURE — 25010000002 AMIODARONE IN DEXTROSE 5% 360-4.14 MG/200ML-% SOLUTION: Performed by: INTERNAL MEDICINE

## 2018-09-12 PROCEDURE — 82962 GLUCOSE BLOOD TEST: CPT

## 2018-09-12 PROCEDURE — 80048 BASIC METABOLIC PNL TOTAL CA: CPT | Performed by: FAMILY MEDICINE

## 2018-09-12 RX ORDER — LISINOPRIL 20 MG/1
20 TABLET ORAL
Status: DISCONTINUED | OUTPATIENT
Start: 2018-09-12 | End: 2018-09-13 | Stop reason: HOSPADM

## 2018-09-12 RX ORDER — FUROSEMIDE 20 MG/1
20 TABLET ORAL DAILY
Status: DISCONTINUED | OUTPATIENT
Start: 2018-09-12 | End: 2018-09-13 | Stop reason: HOSPADM

## 2018-09-12 RX ORDER — ENALAPRILAT 2.5 MG/2ML
1.25 INJECTION INTRAVENOUS ONCE
Status: COMPLETED | OUTPATIENT
Start: 2018-09-12 | End: 2018-09-12

## 2018-09-12 RX ORDER — AMIODARONE HYDROCHLORIDE 200 MG/1
200 TABLET ORAL
Status: DISCONTINUED | OUTPATIENT
Start: 2018-09-12 | End: 2018-09-13 | Stop reason: HOSPADM

## 2018-09-12 RX ADMIN — FUROSEMIDE 20 MG: 20 TABLET ORAL at 10:04

## 2018-09-12 RX ADMIN — ONDANSETRON HYDROCHLORIDE 4 MG: 4 TABLET, FILM COATED ORAL at 18:24

## 2018-09-12 RX ADMIN — AMIODARONE HYDROCHLORIDE 200 MG: 200 TABLET ORAL at 16:29

## 2018-09-12 RX ADMIN — ATORVASTATIN CALCIUM 40 MG: 40 TABLET, FILM COATED ORAL at 21:24

## 2018-09-12 RX ADMIN — RIVAROXABAN 20 MG: 10 TABLET, FILM COATED ORAL at 21:25

## 2018-09-12 RX ADMIN — ENALAPRILAT 1.25 MG: 1.25 INJECTION INTRAVENOUS at 09:01

## 2018-09-12 RX ADMIN — SERTRALINE HYDROCHLORIDE 100 MG: 50 TABLET ORAL at 08:41

## 2018-09-12 RX ADMIN — ONDANSETRON 4 MG: 2 INJECTION, SOLUTION INTRAMUSCULAR; INTRAVENOUS at 08:56

## 2018-09-12 RX ADMIN — AMIODARONE HYDROCHLORIDE 0.5 MG/MIN: 1.8 INJECTION, SOLUTION INTRAVENOUS at 06:13

## 2018-09-12 RX ADMIN — LISINOPRIL 20 MG: 20 TABLET ORAL at 08:42

## 2018-09-12 NOTE — PLAN OF CARE
Problem: Patient Care Overview  Goal: Plan of Care Review  Outcome: Ongoing (interventions implemented as appropriate)   09/12/18 1250   Coping/Psychosocial   Plan of Care Reviewed With patient   Plan of Care Review   Progress improving   OTHER   Outcome Summary Amio gtt continues, Pt VSS, HR-NSR,        Problem: Arrhythmia/Dysrhythmia (Symptomatic) (Adult)  Goal: Signs and Symptoms of Listed Potential Problems Will be Absent, Minimized or Managed (Arrhythmia/Dysrhythmia)  Outcome: Ongoing (interventions implemented as appropriate)      Problem: Pain, Chronic (Adult)  Goal: Acceptable Pain/Comfort Level and Functional Ability  Outcome: Ongoing (interventions implemented as appropriate)

## 2018-09-12 NOTE — PROGRESS NOTES
FAMILY MEDICINE DAILY PROGRESS NOTE  NAME: Juliana Alcazar  : 1945  MRN: [id number]     LOS: 2 days     PROVIDER OF SERVICE: Shelley Dubose MD    Chief Complaint: Atrial fibrillation with RVR (CMS/HCC)    Subjective:     Interval History:  History taken from: patient  Pt is a 72 y.o. Female who presented to ED 2 days agp for rapid heart rate for 2-3 days.PT was found to be in atrial fibrillation with RVR and underlying left bundle branch block.  Her BP at admission was 90/50 mmHg. Her atrial fibrillation corrected yesterday spontaneously and Dr. Trujillo started her on Amiodarone. Pt states since starting her Amiodarone she is feeling much better.Her BP ruby last night, and she states she is to see Dr. Trujillo today for evaluation of whether she should continue her home medication of Lisinopril. She  does not express any other concerns. . She denies any nausea, vomiting, diarrhea, constipation.  Review of Systems:   Review of Systems   Constitutional: Negative for activity change, chills, diaphoresis and fever.   HENT: Negative for ear pain, facial swelling, hearing loss, rhinorrhea, sinus pain, sneezing, sore throat and tinnitus.    Eyes: Negative for discharge and redness.   Respiratory: Negative for cough, chest tightness, shortness of breath and wheezing.    Cardiovascular: Negative for chest pain and palpitations.   Gastrointestinal: Negative for abdominal pain, constipation, diarrhea, nausea and vomiting.   Genitourinary: Negative for dysuria, frequency and urgency.   Musculoskeletal: Positive for arthralgias (Left knee pain) and back pain (Chronic).   Skin: Negative for rash and wound.   Neurological: Negative for tremors, syncope, speech difficulty and headaches.   Psychiatric/Behavioral: Negative for agitation, behavioral problems and confusion. The patient is not nervous/anxious.        Objective:     Vital Signs  Temp:  [97.1 °F (36.2 °C)-98.1 °F (36.7 °C)] 97.1 °F (36.2 °C)  Heart Rate:  [67-91]  78  Resp:  [18-20] 18  BP: (117-177)/(63-82) 174/78    Physical Exam  Physical Exam   Constitutional: She is oriented to person, place, and time. She appears well-developed and well-nourished. No distress.   HENT:   Head: Normocephalic and atraumatic.   Right Ear: External ear normal.   Left Ear: External ear normal.   Eyes: Conjunctivae and EOM are normal. No scleral icterus.   Neck: Normal range of motion.   Cardiovascular: Normal rate and regular rhythm.  Exam reveals no gallop and no friction rub.    No murmur heard.  Pulmonary/Chest: Effort normal and breath sounds normal. No stridor. No respiratory distress. She has no wheezes.   Abdominal: Soft. There is no tenderness.   Musculoskeletal: She exhibits no edema.   Neurological: She is alert and oriented to person, place, and time.   Skin: Skin is warm and dry. She is not diaphoretic. No erythema.   Psychiatric: She has a normal mood and affect. Her behavior is normal. Thought content normal.       Medication Review    Current Facility-Administered Medications:   •  [COMPLETED] amiodarone in dextrose 5% (NEXTERONE) loading dose 150mg/100mL, 150 mg, Intravenous, Once, 150 mg at 18 1235 **FOLLOWED BY** [] amiodarone (NEXTERONE) 360 mg/200 mL (1.8 mg/mL) infusion, 1 mg/min, Intravenous, Continuous, Last Rate: 33.3 mL/hr at 18 1700, 1 mg/min at 18 1700 **FOLLOWED BY** amiodarone (NEXTERONE) 360 mg/200 mL (1.8 mg/mL) infusion, 0.5 mg/min, Intravenous, Continuous, Lillie Velez MD, Last Rate: 16.67 mL/hr at 18 0613, 0.5 mg/min at 18 0613  •  atorvastatin (LIPITOR) tablet 40 mg, 40 mg, Oral, Nightly, Vashti Guzman MD, 40 mg at 18 2106  •  dextrose (D50W) 25 g/ 50mL Intravenous Solution 25 g, 25 g, Intravenous, Q15 Min PRN, Vashti Guzman MD  •  dextrose (GLUTOSE) oral gel 15 g, 15 g, Oral, Q15 Min Megan LOPEZ Lilly Maryam, MD  •  glucagon (human recombinant) (GLUCAGEN DIAGNOSTIC) injection 1 mg, 1  mg, Subcutaneous, PRN, Vashti Guzman MD  •  insulin aspart (novoLOG) injection 0-9 Units, 0-9 Units, Subcutaneous, 4x Daily AC & at Bedtime, Vashti Guzman MD, Stopped at 09/12/18 0730  •  ondansetron (ZOFRAN) tablet 4 mg, 4 mg, Oral, Q6H PRN **OR** ondansetron ODT (ZOFRAN-ODT) disintegrating tablet 4 mg, 4 mg, Oral, Q6H PRN **OR** ondansetron (ZOFRAN) injection 4 mg, 4 mg, Intravenous, Q6H PRN, Vashti Guzman MD  •  oxyCODONE-acetaminophen (PERCOCET)  MG per tablet 1 tablet, 1 tablet, Oral, Q6H PRN, Vashti Guzman MD  •  rivaroxaban (XARELTO) tablet 20 mg, 20 mg, Oral, Daily With Dinner, Vashti Guzman MD, 20 mg at 09/11/18 1746  •  sennosides-docusate sodium (SENOKOT-S) 8.6-50 MG tablet 2 tablet, 2 tablet, Oral, Nightly PRN, Vashti Guzman MD  •  sertraline (ZOLOFT) tablet 100 mg, 100 mg, Oral, Daily, Vashti Guzman MD, 100 mg at 09/11/18 0817  •  sodium chloride 0.9 % flush 1-10 mL, 1-10 mL, Intravenous, PRN, Vashti Guzman MD  •  sodium chloride 0.9 % infusion, 25 mL/hr, Intravenous, Continuous, Vashti Guzman MD, Last Rate: 25 mL/hr at 09/11/18 1700, 25 mL/hr at 09/11/18 1700  •  tiZANidine (ZANAFLEX) tablet 4 mg, 4 mg, Oral, Q8H PRN, Vashti Guzman MD     Diagnostic Data    Lab Results (last 24 hours)     Procedure Component Value Units Date/Time    Basic Metabolic Panel [[id number]]  (Abnormal) Collected:  09/12/18 0534    Specimen:  Blood Updated:  09/12/18 0635     Glucose 122 (H) mg/dL      BUN 14 mg/dL      Creatinine 0.80 mg/dL      Sodium 141 mmol/L      Potassium 3.7 mmol/L      Chloride 105 mmol/L      CO2 28.0 mmol/L      Calcium 8.6 mg/dL      eGFR Non African Amer 71 mL/min/1.73      BUN/Creatinine Ratio 17.5     Anion Gap 8.0 mmol/L     Narrative:       The MDRD GFR formula is only valid for adults with stable renal function between ages 18 and 70.    CBC & Differential [[id number]] Collected:  09/12/18 0534    Specimen:   Blood Updated:  09/12/18 0612    Narrative:       The following orders were created for panel order CBC & Differential.  Procedure                               Abnormality         Status                     ---------                               -----------         ------                     CBC Auto Differential[[id number]]        Normal              Final result                 Please view results for these tests on the individual orders.    CBC Auto Differential [[id number]]  (Normal) Collected:  09/12/18 0534    Specimen:  Blood Updated:  09/12/18 0612     WBC 6.62 10*3/mm3      RBC 4.12 10*6/mm3      Hemoglobin 12.3 g/dL      Hematocrit 35.9 %      MCV 87.1 fL      MCH 29.9 pg      MCHC 34.3 g/dL      RDW 12.7 %      RDW-SD 41.0 fl      MPV 8.6 fL      Platelets 234 10*3/mm3      Neutrophil % 56.6 %      Lymphocyte % 33.5 %      Monocyte % 6.0 %      Eosinophil % 3.6 %      Basophil % 0.3 %      Immature Grans % 0.0 %      Neutrophils, Absolute 3.74 10*3/mm3      Lymphocytes, Absolute 2.22 10*3/mm3      Monocytes, Absolute 0.40 10*3/mm3      Eosinophils, Absolute 0.24 10*3/mm3      Basophils, Absolute 0.02 10*3/mm3      Immature Grans, Absolute 0.00 10*3/mm3     POC Glucose Once [[id number]]  (Normal) Collected:  09/12/18 0534    Specimen:  Blood Updated:  09/12/18 0551     Glucose 122 mg/dL      Comment: RN NotifiedOperator: [id number] [facility] ID: [id number]       POC Glucose Once [[id number]]  (Abnormal) Collected:  09/1945    Specimen:  Blood Updated:  09/11/18 2012     Glucose 160 (H) mg/dL      Comment: RN NotifiedOperator: [id number] [facility] [place] ID: [id number]       POC Glucose Once [[id number]]  (Abnormal) Collected:  09/11/18 1654    Specimen:  Blood Updated:  09/11/18 1715     Glucose 139 (H) mg/dL      Comment: RN NotifiedOperator: [id number] [facility] [id number]       POC Glucose Once [[id number]]  (Normal) Collected:  09/11/18 1132    Specimen:  Blood  Updated:  09/11/18 1147     Glucose 130 mg/dL      Comment: RN NotifiedOperator: [id number] SIDNEY Schneider ID: [id number]       Hemoglobin A1c [[id number]]  (Abnormal) Collected:  09/10/18 1521    Specimen:  Blood Updated:  09/11/18 0839     Hemoglobin A1C 6.2 (H) %     Troponin [[id number]]  (Normal) Collected:  09/11/18 0801    Specimen:  Blood Updated:  09/11/18 0830     Troponin I <0.012 ng/mL            Imaging Results (last 24 hours)     ** No results found for the last 24 hours. **          I reviewed the patient's new clinical results.    Assessment/Plan:     Hospital Problem List     * (Principal)Atrial fibrillation with RVR (CMS/[facility])    Overview Addendum 9/12/2018  7:17 AM by Shelley Dubose MD     -Cardiology following. Seeing Dr. Trujillo, he started her on Amiodarone yesterday  -ECHO ordered to evaluate cardiac function  · Left ventricular wall thickness is consistent with mild concentric hypertrophy.  · Left ventricular systolic function is normal. Estimated EF = 55%.  · Left atrial cavity size is moderately dilated.  · Mild tricuspid valve regurgitation is present.  -Monitoring cardiac activity with telemetry  -CHADSVASc: 4 - continue anticoagulation with Xarelto  -Patient was on rhythm control at home: Rythmol TID  -TSH and Mg+2 WNL         Arthralgia of right hip    Overview Addendum 9/11/2018 12:09 AM by Vashti Guzman MD     -Continue Percocet 10mg PO PRN  -[facility] report #[id number] obtained; consistent with patient's prescribed medications         Type 2 diabetes mellitus without complication, without long-term current use of insulin (CMS/[facility])    Overview Addendum 9/12/2018  7:19 AM by Shelley Dubose MD     -Last HbA1c was 6.2  -Will hold metformin during IP course  -ADA diet recommended; will consult nutrition/dietary  -SSI provided with regular fingersticks to monitor glucose         Class 3 obesity due to excess calories with serious comorbidity and body mass index (BMI) of 45.0 to  49.9 in adult (CMS/[facility])    Overview Signed 9/11/2018 12:11 AM by Vashti Guzman MD     -Encourage lifestyle modifications such as portion control and regular physical activity  -We'll consult nutrition/dietary to educate patient         Polypharmacy            DVT prophylaxis: SCDs/TEDs  Code Status and Medical Interventions:   Ordered at: 09/10/18 2005     Level Of Support Discussed With:    Patient     Code Status:    CPR     Medical Interventions (Level of Support Prior to Arrest):    Full       Plan for disposition:Where: home and When:  symptoms improve      Time: 15 minutes        This document has been electronically signed by Shelley Dubose MD on September 12, 2018 7:19 AM

## 2018-09-12 NOTE — PLAN OF CARE
Problem: Patient Care Overview  Goal: Plan of Care Review  Outcome: Ongoing (interventions implemented as appropriate)    Goal: Individualization and Mutuality  Outcome: Ongoing (interventions implemented as appropriate)    Goal: Discharge Needs Assessment  Outcome: Ongoing (interventions implemented as appropriate)      Problem: Arrhythmia/Dysrhythmia (Symptomatic) (Adult)  Goal: Signs and Symptoms of Listed Potential Problems Will be Absent, Minimized or Managed (Arrhythmia/Dysrhythmia)  Outcome: Ongoing (interventions implemented as appropriate)      Problem: Pain, Chronic (Adult)  Goal: Acceptable Pain/Comfort Level and Functional Ability  Outcome: Ongoing (interventions implemented as appropriate)

## 2018-09-12 NOTE — MEDICAL STUDENT
"FAMILY MEDICINE DAILY PROGRESS NOTE  NAME: Juliana Alcazar  : 1945  MRN: [id number]     LOS: 1 day     PROVIDER OF SERVICE: Danya Chamorro    Chief Complaint: Atrial fibrillation with RVR (CMS/HCC)    Subjective:     Interval History:  History taken from: patient  Mrs. Alcazar is a 73 yo female with a history of paroxysmal atrial fibrillation and type 2 diabetes. She presented to the ED yesterday with palpitations, chest tightness, and dyspnea. She continues to have chest \"heaviness\" and she reports that she \"feels like her hear will jump out of her chest.\" She also continues to have palpitations she describes as minor. She describes muscle stiffness that she contributes to laying flat. She denies chest pain, shortness of breath, and abdominal pain.    Review of Systems:   Review of Systems   Constitutional: Positive for fatigue. Negative for chills, diaphoresis and fever.   HENT: Negative for hearing loss, sinus pain and sinus pressure.    Eyes: Negative for pain and visual disturbance.   Respiratory: Positive for chest tightness. Negative for cough and shortness of breath.    Cardiovascular: Positive for palpitations. Negative for chest pain and leg swelling.   Gastrointestinal: Negative for abdominal pain, diarrhea, nausea and vomiting.   Genitourinary: Negative for difficulty urinating, frequency and urgency.   Musculoskeletal: Positive for myalgias. Negative for arthralgias and neck pain.   Skin: Negative for color change, pallor, rash and wound.   Neurological: Negative for dizziness, weakness and light-headedness.   Psychiatric/Behavioral: Negative for confusion. The patient is not nervous/anxious.        Objective:     Vital Signs  Temp:  [96 °F (35.6 °C)-97.6 °F (36.4 °C)] 97.6 °F (36.4 °C)  Heart Rate:  [] 79  Resp:  [18-20] 18  BP: ()/(51-94) 113/68    Physical Exam  Physical Exam   Constitutional: She is oriented to person, place, and time. She appears well-developed and " well-nourished.   HENT:   Head: Normocephalic.   Right Ear: External ear normal.   Left Ear: External ear normal.   Eyes: Conjunctivae and EOM are normal.   Neck: Normal range of motion. Neck supple.   Cardiovascular: Intact distal pulses.    Distant heart sounds.   Pulmonary/Chest: Effort normal and breath sounds normal.   Abdominal: Soft. Bowel sounds are normal.   Musculoskeletal: Normal range of motion. She exhibits no edema.   Neurological: She is alert and oriented to person, place, and time.   Skin: Skin is warm and dry.   Psychiatric: She has a normal mood and affect. Her behavior is normal. Judgment and thought content normal.       Medication Review    Current Facility-Administered Medications:   •  atorvastatin (LIPITOR) tablet 40 mg, 40 mg, Oral, Nightly, Vashti Guzman MD, 40 mg at 09/10/18 2125  •  dextrose (D50W) 25 g/ 50mL Intravenous Solution 25 g, 25 g, Intravenous, Q15 Min PRN, Vashti Guzman MD  •  dextrose (GLUTOSE) oral gel 15 g, 15 g, Oral, Q15 Min PRN, Vashti Guzman MD  •  diltiaZEM (CARDIZEM) 125 mg in 100 mL sodium chloride 0.9% (total volume 125 mL), 5-15 mg/hr, Intravenous, Titrated, Keaton Castillo MD, Last Rate: 2.5 mL/hr at 09/11/18 0149, 2.5 mg/hr at 09/11/18 0149  •  glucagon (human recombinant) (GLUCAGEN DIAGNOSTIC) injection 1 mg, 1 mg, Subcutaneous, PRN, Vashti Guzman MD  •  insulin aspart (novoLOG) injection 0-9 Units, 0-9 Units, Subcutaneous, 4x Daily AC & at Bedtime, Vashti Guzman MD  •  ondansetron (ZOFRAN) tablet 4 mg, 4 mg, Oral, Q6H PRN **OR** ondansetron ODT (ZOFRAN-ODT) disintegrating tablet 4 mg, 4 mg, Oral, Q6H PRN **OR** ondansetron (ZOFRAN) injection 4 mg, 4 mg, Intravenous, Q6H PRN, Vashti Guzman MD  •  oxyCODONE-acetaminophen (PERCOCET)  MG per tablet 1 tablet, 1 tablet, Oral, Q6H PRN, Vashti Guzman MD  •  rivaroxaban (XARELTO) tablet 20 mg, 20 mg, Oral, Daily With Dinner, Vashti Guzman MD  •   sennosides-docusate sodium (SENOKOT-S) 8.6-50 MG tablet 2 tablet, 2 tablet, Oral, Nightly PRN, Vashti Guzman MD  •  sertraline (ZOLOFT) tablet 100 mg, 100 mg, Oral, Daily, Vashti Guzman MD  •  sodium chloride 0.9 % flush 1-10 mL, 1-10 mL, Intravenous, PRN, Vashti Guzman MD  •  sodium chloride 0.9 % infusion, 25 mL/hr, Intravenous, Continuous, Vashti Guzman MD, Last Rate: 25 mL/hr at 09/11/18 0203, 25 mL/hr at 09/11/18 0203  •  tiZANidine (ZANAFLEX) tablet 4 mg, 4 mg, Oral, Q8H PRN, Vashti Guzman MD     Diagnostic Data    Lab Results (last 24 hours)     Procedure Component Value Units Date/Time    POC Glucose Once [[id number]]  (Normal) Collected:  09/11/18 0632    Specimen:  Blood Updated:  09/11/18 0643     Glucose 123 mg/dL      Comment: Result Not ConfirmedOperator: [id number] [facility] [id number]       Troponin [[id number]]  (Normal) Collected:  09/11/18 0157    Specimen:  Blood Updated:  09/11/18 0317     Troponin I <0.012 ng/mL     Basic Metabolic Panel [[id number]]  (Abnormal) Collected:  09/11/18 0157    Specimen:  Blood Updated:  09/11/18 0306     Glucose 118 (H) mg/dL      BUN 21 mg/dL      Creatinine 0.97 mg/dL      Sodium 140 mmol/L      Potassium 3.9 mmol/L      Chloride 106 mmol/L      CO2 27.0 mmol/L      Calcium 8.7 mg/dL      eGFR Non African Amer 57 mL/min/1.73      BUN/Creatinine Ratio 21.6     Anion Gap 7.0 mmol/L     Narrative:       The MDRD GFR formula is only valid for adults with stable renal function between ages 18 and 70.    CBC & Differential [[id number]] Collected:  09/11/18 0157    Specimen:  Blood Updated:  09/11/18 0252    Narrative:       The following orders were created for panel order CBC & Differential.  Procedure                               Abnormality         Status                     ---------                               -----------         ------                     CBC Auto Differential[[id number]]        Normal               Final result                 Please view results for these tests on the individual orders.    CBC Auto Differential [[id number]]  (Normal) Collected:  09/11/18 0157    Specimen:  Blood Updated:  09/11/18 0252     WBC 8.85 10*3/mm3      RBC 4.26 10*6/mm3      Hemoglobin 12.9 g/dL      Hematocrit 37.5 %      MCV 88.0 fL      MCH 30.3 pg      MCHC 34.4 g/dL      RDW 12.9 %      RDW-SD 41.2 fl      MPV 8.0 fL      Platelets 263 10*3/mm3      Neutrophil % 58.3 %      Lymphocyte % 29.8 %      Monocyte % 8.4 %      Eosinophil % 2.9 %      Basophil % 0.5 %      Immature Grans % 0.1 %      Neutrophils, Absolute 5.16 10*3/mm3      Lymphocytes, Absolute 2.64 10*3/mm3      Monocytes, Absolute 0.74 10*3/mm3      Eosinophils, Absolute 0.26 10*3/mm3      Basophils, Absolute 0.04 10*3/mm3      Immature Grans, Absolute 0.01 10*3/mm3      nRBC 0.0 /100 WBC     Extra Tubes [[id number]] Collected:  09/10/18 2054    Specimen:  Blood from Blood, Venous Line Updated:  09/10/18 2201    Narrative:       The following orders were created for panel order Extra Tubes.  Procedure                               Abnormality         Status                     ---------                               -----------         ------                     Gold Top - SST[[id number]]                                   Final result                 Please view results for these tests on the individual orders.    Gold Top - SST [[id number]] Collected:  09/10/18 2054    Specimen:  Blood Updated:  09/10/18 2201     Extra Tube Hold for add-ons.     Comment: Auto resulted.       Troponin [[id number]]  (Normal) Collected:  09/10/18 2049    Specimen:  Blood Updated:  09/10/18 2131     Troponin I 0.013 ng/mL     TSH [[id number]]  (Normal) Collected:  09/10/18 1521    Specimen:  Blood Updated:  09/10/18 2043     TSH 1.940 mIU/mL     Hemoglobin A1c [[id number]] Collected:  09/10/18 1521    Specimen:  Blood Updated:  09/10/18 2034    T4, Free [[id number]]  (Normal) Collected:   09/10/18 1521    Specimen:  Blood Updated:  09/10/18 2029     Free T4 1.04 ng/dL     Magnesium [[id number]]  (Normal) Collected:  09/10/18 1521    Specimen:  Blood Updated:  09/10/18 2012     Magnesium 1.9 mg/dL     POC Glucose Once [[id number]]  (Normal) Collected:  09/10/18 1931    Specimen:  Blood Updated:  09/10/18 1948     Glucose 122 mg/dL      Comment: Result Not ConfirmedOperator: [id number] [facility] ID: [id number]       Lipid Panel [[id number]]  (Abnormal) Collected:  09/10/18 1521    Specimen:  Blood Updated:  09/10/18 1945     Total Cholesterol 148 mg/dL      Triglycerides 352 (H) mg/dL      HDL Cholesterol 24 (L) mg/dL      LDL Cholesterol  75 mg/dL      LDL/HDL Ratio 2.23    Urinalysis, Microscopic Only - Urine, Clean Catch [[id number]]  (Abnormal) Collected:  09/10/18 1714    Specimen:  Urine from Urine, Clean Catch Updated:  09/10/18 1753     RBC, UA 3-5 (A) /HPF      WBC, UA 3-5 /HPF      Bacteria, UA 4+ (A) /HPF      Squamous Epithelial Cells, UA 3-5 (A) /HPF      Hyaline Casts, UA None Seen /LPF      Methodology Automated Microscopy    Urinalysis With Microscopic If Indicated (No Culture) - Urine, Clean Catch [[id number]]  (Abnormal) Collected:  09/10/18 1714    Specimen:  Urine from Urine, Clean Catch Updated:  09/10/18 1725     Color, UA Yellow     Appearance, UA Clear     pH, UA 6.5     Specific [place], UA 1.024     Comment: Result obtained by Refractometer        Glucose, UA Negative     Ketones, UA Negative     Bilirubin, UA Negative     Blood, UA Moderate (2+) (A)     Protein, UA 30 mg/dL (1+) (A)     Leuk Esterase, UA Trace (A)     Nitrite, UA Negative     Urobilinogen, UA 0.2 E.U./dL    [place] Draw [[id number]] Collected:  09/10/18 1521    Specimen:  Blood Updated:  09/10/18 1632    Narrative:       The following orders were created for panel order [place] Draw.  Procedure                               Abnormality         Status                     ---------                                -----------         ------                     Light Blue Top[[id number]]                                   Final result               Green Top (Gel)[[id number]]                                  Final result               Lavender Top[[id number]]                                     Final result               Gold Top - SST[[id number]]                                   Final result                 Please view results for these tests on the individual orders.    Light Blue Top [[id number]] Collected:  09/10/18 1521    Specimen:  Blood Updated:  09/10/18 1632     Extra Tube hold for add-on     Comment: Auto resulted       Green Top (Gel) [[id number]] Collected:  09/10/18 1521    Specimen:  Blood Updated:  09/10/18 1632     Extra Tube Hold for add-ons.     Comment: Auto resulted.       Lavender Top [[id number]] Collected:  09/10/18 1521    Specimen:  Blood Updated:  09/10/18 1632     Extra Tube hold for add-on     Comment: Auto resulted       Gold Top - SST [[id number]] Collected:  09/10/18 1521    Specimen:  Blood Updated:  09/10/18 1632     Extra Tube Hold for add-ons.     Comment: Auto resulted.       BNP [[id number]]  (Abnormal) Collected:  09/10/18 1521    Specimen:  Blood Updated:  09/10/18 1602     proBNP 1,370.0 (H) pg/mL     Troponin [[id number]]  (Normal) Collected:  09/10/18 1521    Specimen:  Blood Updated:  09/10/18 1602     Troponin I <0.012 ng/mL     Comprehensive Metabolic Panel [[id number]]  (Abnormal) Collected:  09/10/18 1521    Specimen:  Blood Updated:  09/10/18 1551     Glucose 129 (H) mg/dL      BUN 16 mg/dL      Creatinine 0.95 mg/dL      Sodium 142 mmol/L      Potassium 4.1 mmol/L      Chloride 105 mmol/L      CO2 27.0 mmol/L      Calcium 9.1 mg/dL      Total Protein 7.1 g/dL      Albumin 3.80 g/dL      ALT (SGPT) 34 U/L      AST (SGOT) 28 U/L      Alkaline Phosphatase 56 U/L      Total Bilirubin 0.5 mg/dL      eGFR Non African Amer 58 mL/min/1.73      Globulin 3.3 gm/dL      A/G Ratio 1.2 g/dL       BUN/Creatinine Ratio 16.8     Anion Gap 10.0 mmol/L     Narrative:       The MDRD GFR formula is only valid for adults with stable renal function between ages 18 and 70.    CBC & Differential [[id number]] Collected:  09/10/18 1521    Specimen:  Blood Updated:  09/10/18 1530    Narrative:       The following orders were created for panel order CBC & Differential.  Procedure                               Abnormality         Status                     ---------                               -----------         ------                     CBC Auto Differential[[id number]]        Normal              Final result                 Please view results for these tests on the individual orders.    CBC Auto Differential [[id number]]  (Normal) Collected:  09/10/18 1521    Specimen:  Blood Updated:  09/10/18 1530     WBC 6.96 10*3/mm3      RBC 4.57 10*6/mm3      Hemoglobin 13.7 g/dL      Hematocrit 39.5 %      MCV 86.4 fL      MCH 30.0 pg      MCHC 34.7 g/dL      RDW 12.8 %      RDW-SD 40.2 fl      MPV 8.7 fL      Platelets 251 10*3/mm3      Neutrophil % 64.3 %      Lymphocyte % 24.9 %      Monocyte % 7.5 %      Eosinophil % 2.9 %      Basophil % 0.3 %      Immature Grans % 0.1 %      Neutrophils, Absolute 4.48 10*3/mm3      Lymphocytes, Absolute 1.73 10*3/mm3      Monocytes, Absolute 0.52 10*3/mm3      Eosinophils, Absolute 0.20 10*3/mm3      Basophils, Absolute 0.02 10*3/mm3      Immature Grans, Absolute 0.01 10*3/mm3            Imaging Results (last 24 hours)     Procedure Component Value Units Date/Time    XR Chest 1 View [[id number]] Collected:  09/10/18 1527     Updated:  09/10/18 1555    Narrative:         EXAM:         Radiograph(s), Chest   VIEWS:   Frontal  ; 1       DATE/TIME:  9/10/2018 3:42 PM CDT                INDICATION:   chest discomfort, atrial fib    COMPARISON:  CXR: 1/9/17             FINDINGS:             - lines/tubes:    none     - cardiac:         size within normal limits         - mediastinum: contour  within normal limits         - lungs:         no focal air space process, pulmonary  interstitial edema, nodule(s)/mass             - pleura:         no evidence of  fluid                  - osseous:         unremarkable for age                  - misc.:         Impression:       CONCLUSION:        1. No evidence of an active cardiopulmonary process.                                                              Electronically signed by:  SUJATA Pruitt MD  9/10/2018 3:54  PM CDT Workstation: [id number]          I reviewed the patient's new clinical results.    Assessment/Plan:     Hospital Problem List     * (Principal)Atrial fibrillation with RVR (CMS/[facility])    Overview Addendum 9/11/2018 12:22 AM by Vashti Guzman MD     -Status post IV Lopressor 5mg in the ED  -Will continue IV Cardizem infusion as tolerated by BP; will consider amiodarone  -Will consult cardiology in the morning  -ECHO ordered to evaluate cardiac function  -Monitoring cardiac activity with telemetry  -CHADSVASc: 4 - continue anticoagulation with Xarelto  -Patient was on rhythm control at home: Rythmol TID  -TSH and Mg+2 WNL         Arthralgia of right hip    Overview Addendum 9/11/2018 12:09 AM by Vashti Guzman MD     -Continue Percocet 10mg PO PRN  -[facility] report #[id number] obtained; consistent with patient's prescribed medications         Type 2 diabetes mellitus without complication, without long-term current use of insulin (CMS/[facility])    Overview Signed 9/10/2018  8:23 PM by Vashti Guzman MD     -Last HbA1c was 6.5% approximately two years ago  -Orders placed for new HbA1c; will recommend that patient follow up with PCP to discuss risks and benefits of continued metformin  -Will hold metformin during IP course  -ADA diet recommended; will consult nutrition/dietary  -SSI provided with regular fingersticks to monitor glucose         Class 3 obesity due to excess calories with serious comorbidity and body mass index (BMI) of  45.0 to 49.9 in adult (CMS/[facility])    Overview Signed 9/11/2018 12:11 AM by Vashti Guzman MD     -Encourage lifestyle modifications such as portion control and regular physical activity  -We'll consult nutrition/dietary to educate patient         Polypharmacy            DVT prophylaxis: Xarelto  Code Status and Medical Interventions:   Ordered at: 09/10/18 2005     Level Of Support Discussed With:    Patient     Code Status:    CPR     Medical Interventions (Level of Support Prior to Arrest):    Full       Plan for disposition:      Time: 15 mintues      This document has been electronically signed by Danya Chamorro on September 11, 2018 7:08 AM

## 2018-09-12 NOTE — NURSING NOTE
Dr Guzman notified regarding Pt B/P being elevated and PTA lisinopril not being restarted. No orders received

## 2018-09-12 NOTE — MEDICAL STUDENT
FAMILY MEDICINE DAILY PROGRESS NOTE  NAME: Juliana Alcazar  : 1945  MRN: [id number]     LOS: 2 days     PROVIDER OF SERVICE: Danya Chamorro    Chief Complaint: Atrial fibrillation with RVR (CMS/HCC)    Subjective:     Interval History:  History taken from: patient chart  Mrs. Alcazar is a 73 yo female with a history of paroxysmal atrial fibrillation and type 2 diabetes, who presented to the ED with palpitations, chest tightness, and dyspnea. She was diagnosed with atrial fibrillation with rvr and left bundle branch block. She converted to normal sinus rhythm yesterday. Amiodarone drip was started yesterday. She denies chest pain, pressure, shortness of breath, nausea, vomiting, and abdominal pain.    Review of Systems:   Review of Systems   Constitutional: Positive for fatigue. Negative for chills, diaphoresis and fever.   HENT: Positive for sore throat. Negative for sinus pain, sinus pressure and trouble swallowing.    Eyes: Positive for pain and visual disturbance.   Respiratory: Negative for chest tightness and shortness of breath.    Cardiovascular: Negative for chest pain, palpitations and leg swelling.   Gastrointestinal: Negative for abdominal pain, constipation, diarrhea, nausea and vomiting.   Genitourinary: Negative for dysuria and flank pain.   Musculoskeletal: Positive for back pain. Negative for myalgias.   Skin: Negative for color change, pallor and rash.   Neurological: Negative for dizziness, light-headedness and headaches.   Psychiatric/Behavioral: Negative for agitation and confusion. The patient is not nervous/anxious.        Objective:     Vital Signs  Temp:  [97.1 °F (36.2 °C)-98.1 °F (36.7 °C)] 97.1 °F (36.2 °C)  Heart Rate:  [67-91] 78  Resp:  [18-20] 18  BP: (117-177)/(63-82) 174/78    Physical Exam  Physical Exam    Medication Review    Current Facility-Administered Medications:   •  [COMPLETED] amiodarone in dextrose 5% (NEXTERONE) loading dose 150mg/100mL, 150 mg, Intravenous,  Once, 150 mg at 18 1235 **FOLLOWED BY** [] amiodarone (NEXTERONE) 360 mg/200 mL (1.8 mg/mL) infusion, 1 mg/min, Intravenous, Continuous, Last Rate: 33.3 mL/hr at 18 1700, 1 mg/min at 18 1700 **FOLLOWED BY** amiodarone (NEXTERONE) 360 mg/200 mL (1.8 mg/mL) infusion, 0.5 mg/min, Intravenous, Continuous, Lillie Velez MD, Last Rate: 16.67 mL/hr at 18 0613, 0.5 mg/min at 18 0613  •  atorvastatin (LIPITOR) tablet 40 mg, 40 mg, Oral, Nightly, Vashti Guzman MD, 40 mg at 18 2106  •  dextrose (D50W) 25 g/ 50mL Intravenous Solution 25 g, 25 g, Intravenous, Q15 Min PRN, Vashti Guzman MD  •  dextrose (GLUTOSE) oral gel 15 g, 15 g, Oral, Q15 Min PRN, Vashti Guzman MD  •  glucagon (human recombinant) (GLUCAGEN DIAGNOSTIC) injection 1 mg, 1 mg, Subcutaneous, PRN, Vashti Guzman MD  •  insulin aspart (novoLOG) injection 0-9 Units, 0-9 Units, Subcutaneous, 4x Daily AC & at Bedtime, Vashti Guzman MD, Stopped at 18 0730  •  ondansetron (ZOFRAN) tablet 4 mg, 4 mg, Oral, Q6H PRN **OR** ondansetron ODT (ZOFRAN-ODT) disintegrating tablet 4 mg, 4 mg, Oral, Q6H PRN **OR** ondansetron (ZOFRAN) injection 4 mg, 4 mg, Intravenous, Q6H PRN, Vashti Guzmna MD  •  oxyCODONE-acetaminophen (PERCOCET)  MG per tablet 1 tablet, 1 tablet, Oral, Q6H PRN, Vashti Guzman MD  •  rivaroxaban (XARELTO) tablet 20 mg, 20 mg, Oral, Daily With Dinner, Vashti Guzman MD, 20 mg at 18 1746  •  sennosides-docusate sodium (SENOKOT-S) 8.6-50 MG tablet 2 tablet, 2 tablet, Oral, Nightly PRN, Vashti Guzman MD  •  sertraline (ZOLOFT) tablet 100 mg, 100 mg, Oral, Daily, Vashti Guzman MD, 100 mg at 18 0817  •  sodium chloride 0.9 % flush 1-10 mL, 1-10 mL, Intravenous, PRN, Vashti Guzman MD  •  sodium chloride 0.9 % infusion, 25 mL/hr, Intravenous, Continuous, Vashti Guzman MD, Last Rate: 25 mL/hr at  09/11/18 1700, 25 mL/hr at 09/11/18 1700  •  tiZANidine (ZANAFLEX) tablet 4 mg, 4 mg, Oral, Q8H Megan LOPEZ Lilly Maryam, MD     Diagnostic Data    Lab Results (last 24 hours)     Procedure Component Value Units Date/Time    Basic Metabolic Panel [[id number]]  (Abnormal) Collected:  09/12/18 0534    Specimen:  Blood Updated:  09/12/18 0635     Glucose 122 (H) mg/dL      BUN 14 mg/dL      Creatinine 0.80 mg/dL      Sodium 141 mmol/L      Potassium 3.7 mmol/L      Chloride 105 mmol/L      CO2 28.0 mmol/L      Calcium 8.6 mg/dL      eGFR Non African Amer 71 mL/min/1.73      BUN/Creatinine Ratio 17.5     Anion Gap 8.0 mmol/L     Narrative:       The MDRD GFR formula is only valid for adults with stable renal function between ages 18 and 70.    CBC & Differential [[id number]] Collected:  09/12/18 0534    Specimen:  Blood Updated:  09/12/18 0612    Narrative:       The following orders were created for panel order CBC & Differential.  Procedure                               Abnormality         Status                     ---------                               -----------         ------                     CBC Auto Differential[[id number]]        Normal              Final result                 Please view results for these tests on the individual orders.    CBC Auto Differential [[id number]]  (Normal) Collected:  09/12/18 0534    Specimen:  Blood Updated:  09/12/18 0612     WBC 6.62 10*3/mm3      RBC 4.12 10*6/mm3      Hemoglobin 12.3 g/dL      Hematocrit 35.9 %      MCV 87.1 fL      MCH 29.9 pg      MCHC 34.3 g/dL      RDW 12.7 %      RDW-SD 41.0 fl      MPV 8.6 fL      Platelets 234 10*3/mm3      Neutrophil % 56.6 %      Lymphocyte % 33.5 %      Monocyte % 6.0 %      Eosinophil % 3.6 %      Basophil % 0.3 %      Immature Grans % 0.0 %      Neutrophils, Absolute 3.74 10*3/mm3      Lymphocytes, Absolute 2.22 10*3/mm3      Monocytes, Absolute 0.40 10*3/mm3      Eosinophils, Absolute 0.24 10*3/mm3      Basophils, Absolute 0.02  10*3/mm3      Immature Grans, Absolute 0.00 10*3/mm3     POC Glucose Once [[id number]]  (Normal) Collected:  09/12/18 0534    Specimen:  Blood Updated:  09/12/18 0551     Glucose 122 mg/dL      Comment: RN NotifiedOperator: [id number] RACHEL BERNARDONAMeter ID: [id number]       POC Glucose Once [[id number]]  (Abnormal) Collected:  09/1945    Specimen:  Blood Updated:  09/11/18 2012     Glucose 160 (H) mg/dL      Comment: RN NotifiedOperator: [id number] RACHEL SHAWNAMeter ID: [id number]       POC Glucose Once [[id number]]  (Abnormal) Collected:  09/11/18 1654    Specimen:  Blood Updated:  09/11/18 1715     Glucose 139 (H) mg/dL      Comment: RN NotifiedOperator: [id number] SIDNEY OROZCOMeter ID: [id number]       POC Glucose Once [[id number]]  (Normal) Collected:  09/11/18 1132    Specimen:  Blood Updated:  09/11/18 1147     Glucose 130 mg/dL      Comment: RN NotifiedOperator: [id number] SIDNEY OROZCOMeter ID: [id number]       Hemoglobin A1c [[id number]]  (Abnormal) Collected:  09/10/18 1521    Specimen:  Blood Updated:  09/11/18 0839     Hemoglobin A1C 6.2 (H) %     Troponin [[id number]]  (Normal) Collected:  09/11/18 0801    Specimen:  Blood Updated:  09/11/18 0830     Troponin I <0.012 ng/mL            Imaging Results (last 24 hours)     ** No results found for the last 24 hours. **          I reviewed the patient's new clinical results.    Assessment/Plan:     Hospital Problem List     * (Principal)Atrial fibrillation with RVR (CMS/HCC)    Overview Addendum 9/12/2018  7:17 AM by Shelley Dubose MD     -Cardiology following. Seeing Dr. Trujillo, he started her on Amiodarone yesterday  -ECHO ordered to evaluate cardiac function  · Left ventricular wall thickness is consistent with mild concentric hypertrophy.  · Left ventricular systolic function is normal. Estimated EF = 55%.  · Left atrial cavity size is moderately dilated.  · Mild tricuspid valve regurgitation is present.  -Monitoring cardiac activity  with telemetry  -CHADSVASc: 4 - continue anticoagulation with Xarelto  -Patient was on rhythm control at home: Rythmol TID  -TSH and Mg+2 WNL         Arthralgia of right hip    Overview Addendum 9/11/2018 12:09 AM by Vashti Guzman MD     -Continue Percocet 10mg PO PRN  -[facility] report #[id number] obtained; consistent with patient's prescribed medications         Type 2 diabetes mellitus without complication, without long-term current use of insulin (CMS/[facility])    Overview Addendum 9/12/2018  7:19 AM by Shelley Dubose MD     -Last HbA1c was 6.2  -Will hold metformin during IP course  -ADA diet recommended; will consult nutrition/dietary  -SSI provided with regular fingersticks to monitor glucose         Class 3 obesity due to excess calories with serious comorbidity and body mass index (BMI) of 45.0 to 49.9 in adult (CMS/[facility])    Overview Signed 9/11/2018 12:11 AM by Vashti Guzman MD     -Encourage lifestyle modifications such as portion control and regular physical activity  -We'll consult nutrition/dietary to educate patient         Polypharmacy            DVT prophylaxis: Xarelto  Code Status and Medical Interventions:   Ordered at: 09/10/18 2005     Level Of Support Discussed With:    Patient     Code Status:    CPR     Medical Interventions (Level of Support Prior to Arrest):    Full       Plan for disposition:Where: home and When:  tomorrow      Time: 15 min      This document has been electronically signed by Danya Chamorro on September 12, 2018 7:23 AM

## 2018-09-12 NOTE — SIGNIFICANT NOTE
Juliana Alcazar is a 72 y.o.  female admitted for atrial fibrillation with rapid ventricular response and started on a Cardizem infusion.  During this time patient's home antihypertensive medications were held due to concerns of potential hypotension.  Cardiology was consulted and recommended potential electrocardioversion as patient continued to remain in atrial fibrillation despite rate control.  Amiodarone infusion was started and patient converted to normal sinus rhythm.  We'll discuss patient's care with cardiology, particularly the need for rate control.  Will continue to hold home antihypertensive medications in anticipation of potential rate control.  If patient is better managed with rhythm control then we'll restart patient's home antihypertensive therapy, lisinopril.     Signature  Vashti Guzman MD  [facility] Family Medicine Resident, PGY III        This document has been electronically signed by Vashti Guzman MD on September 12, 2018 7:12 AM

## 2018-09-12 NOTE — PROGRESS NOTES
CARDIOLOGY PROGRESS NOTE      LOS: 2 days   Patient Care Team:  Miranda Llanes APRN as PCP - General    Problems/Diagnoses:     Juliana Alcazar is a 72 y.o.  female with a history of paroxysmal atrial fibrillation, hypertension, hyperlipidemia, obesity, sleep apnea   who presented with recurrence of atrial fibrillation.  She has history of pulmonary vein isolation for atrial fibrillation about 5 years prior    Subjective     Interval History: The patient spontaneously converted to sinus rhythm yesterday and was started on IV amiodarone protocol.  She remains in sinus rhythm today with underlying LBBB  She denies any chest pain or shortness of breath today.  Blood pressure was noted to be elevated yesterday evening and today morning.    Review of Systems:     Constitutional:  Denies recent weight loss, weight gain, fever or chills     HENT:  Denies any hearing loss, epistaxis, hoarseness, or difficulty speaking.     Eyes: Wears eyeglasses or contact lenses     Respiratory:  Denies dyspnea with exertion,no cough, wheezing, or hemoptysis.     Cardiovascular: Negative for palpitations, chest pain,     Gastrointestinal:  Denies change in bowel habits, dyspepsia, ulcer disease, hematochezia, or melena.     Endocrine: Negative for cold intolerance, heat intolerance, polydipsia, polyphagia and polyuria.     Genitourinary: Negative.      Objective     Vital Signs  Temp:  [97.1 °F (36.2 °C)-98.1 °F (36.7 °C)] 97.1 °F (36.2 °C)  Heart Rate:  [67-78] 78  Resp:  [18-20] 18  BP: (117-177)/(63-82) 174/78    Physical Exam:    Constitutional: Cooperative, alert and oriented,  in no acute distress.     HENT:   Head: Normocephalic, normal hair patterns, no masses or tenderness.  Ears, Nose, and Throat: No gross abnormalities. No pallor or cyanosis.  Eyes: EOMS intact, PERRL, conjunctivae and lids unremarkable. Fundoscopic exam and visual fields not performed.   Neck: No palpable masses or adenopathy, no thyromegaly, no JVD,  carotid pulses are full and equal bilaterally and without  Bruits.     Cardiovascular: Regular rhythm, S1 and S2 normal, no S3 or S4.No murmurs, gallops, or rubs detected.     Pulmonary/Chest: Chest: normal symmetry, no tenderness to palpation, normal respiratory excursion, no intercostal retraction, no use of accessory muscles.            Pulmonary: Normal breath sounds. No rales or rhonchi.    Abdominal: Abdomen soft, bowel sounds normoactive, no masses, no hepatosplenomegaly, non-tender, no bruits.     Musculoskeletal: No deformities, clubbing, cyanosis, erythema, or edema observed.     Results Review:    Lab Results (last 24 hours)     Procedure Component Value Units Date/Time    Basic Metabolic Panel [[id number]]  (Abnormal) Collected:  09/12/18 0534    Specimen:  Blood Updated:  09/12/18 0635     Glucose 122 (H) mg/dL      BUN 14 mg/dL      Creatinine 0.80 mg/dL      Sodium 141 mmol/L      Potassium 3.7 mmol/L      Chloride 105 mmol/L      CO2 28.0 mmol/L      Calcium 8.6 mg/dL      eGFR Non African Amer 71 mL/min/1.73      BUN/Creatinine Ratio 17.5     Anion Gap 8.0 mmol/L     Narrative:       The MDRD GFR formula is only valid for adults with stable renal function between ages 18 and 70.    CBC & Differential [[id number]] Collected:  09/12/18 0534    Specimen:  Blood Updated:  09/12/18 0612    Narrative:       The following orders were created for panel order CBC & Differential.  Procedure                               Abnormality         Status                     ---------                               -----------         ------                     CBC Auto Differential[[id number]]        Normal              Final result                 Please view results for these tests on the individual orders.    CBC Auto Differential [[id number]]  (Normal) Collected:  09/12/18 0534    Specimen:  Blood Updated:  09/12/18 0612     WBC 6.62 10*3/mm3      RBC 4.12 10*6/mm3      Hemoglobin 12.3 g/dL      Hematocrit 35.9 %       MCV 87.1 fL      MCH 29.9 pg      MCHC 34.3 g/dL      RDW 12.7 %      RDW-SD 41.0 fl      MPV 8.6 fL      Platelets 234 10*3/mm3      Neutrophil % 56.6 %      Lymphocyte % 33.5 %      Monocyte % 6.0 %      Eosinophil % 3.6 %      Basophil % 0.3 %      Immature Grans % 0.0 %      Neutrophils, Absolute 3.74 10*3/mm3      Lymphocytes, Absolute 2.22 10*3/mm3      Monocytes, Absolute 0.40 10*3/mm3      Eosinophils, Absolute 0.24 10*3/mm3      Basophils, Absolute 0.02 10*3/mm3      Immature Grans, Absolute 0.00 10*3/mm3     POC Glucose Once [[id number]]  (Normal) Collected:  09/12/18 0534    Specimen:  Blood Updated:  09/12/18 0551     Glucose 122 mg/dL      Comment: RN NotifiedOperator: [id number] Vioozer ID: [id number]       POC Glucose Once [[id number]]  (Abnormal) Collected:  09/1945    Specimen:  Blood Updated:  09/11/18 2012     Glucose 160 (H) mg/dL      Comment: RN NotifiedOperator: [id number] Vioozer ID: [id number]       POC Glucose Once [[id number]]  (Abnormal) Collected:  09/11/18 1654    Specimen:  Blood Updated:  09/11/18 1715     Glucose 139 (H) mg/dL      Comment: RN NotifiedOperator: [id number] SIDNEY Ecelles Carsoner ID: [id number]       POC Glucose Once [[id number]]  (Normal) Collected:  09/11/18 1132    Specimen:  Blood Updated:  09/11/18 1147     Glucose 130 mg/dL      Comment: RN NotifiedOperator: [id number] SIDNEY MetropiaMeter ID: [id number]       Hemoglobin A1c [[id number]]  (Abnormal) Collected:  09/10/18 1521    Specimen:  Blood Updated:  09/11/18 0839     Hemoglobin A1C 6.2 (H) %     Troponin [[id number]]  (Normal) Collected:  09/11/18 0801    Specimen:  Blood Updated:  09/11/18 0830     Troponin I <0.012 ng/mL             Medication Review:   Current Facility-Administered Medications   Medication Dose Route Frequency Provider Last Rate Last Dose   • amiodarone (NEXTERONE) 360 mg/200 mL (1.8 mg/mL) infusion  0.5 mg/min Intravenous Continuous Lillie Velez  MD Meagan 16.67 mL/hr at 09/12/18 0613 0.5 mg/min at 09/12/18 0613   • atorvastatin (LIPITOR) tablet 40 mg  40 mg Oral Nightly Vashti Guzman MD   40 mg at 09/11/18 2106   • dextrose (D50W) 25 g/ 50mL Intravenous Solution 25 g  25 g Intravenous Q15 Min PRN Vashti Guzman MD       • dextrose (GLUTOSE) oral gel 15 g  15 g Oral Q15 Min PRN Vashti Guzman MD       • glucagon (human recombinant) (GLUCAGEN DIAGNOSTIC) injection 1 mg  1 mg Subcutaneous PRN Vashti Guzman MD       • insulin aspart (novoLOG) injection 0-9 Units  0-9 Units Subcutaneous 4x Daily AC & at Bedtime Vashti Guzman MD   Stopped at 09/12/18 0730   • lisinopril (PRINIVIL,ZESTRIL) tablet 20 mg  20 mg Oral Q24H Lillie Velez MD       • ondansetron (ZOFRAN) tablet 4 mg  4 mg Oral Q6H PRN Vashti Guzman MD        Or   • ondansetron ODT (ZOFRAN-ODT) disintegrating tablet 4 mg  4 mg Oral Q6H PRN Vashti Guzman MD        Or   • ondansetron (ZOFRAN) injection 4 mg  4 mg Intravenous Q6H PRN Vashti Guzman MD       • oxyCODONE-acetaminophen (PERCOCET)  MG per tablet 1 tablet  1 tablet Oral Q6H PRN Vashti Guzman MD       • rivaroxaban (XARELTO) tablet 20 mg  20 mg Oral Daily With Dinner Vashti Guzman MD   20 mg at 09/11/18 1746   • sennosides-docusate sodium (SENOKOT-S) 8.6-50 MG tablet 2 tablet  2 tablet Oral Nightly PRN Vashti Guzman MD       • sertraline (ZOLOFT) tablet 100 mg  100 mg Oral Daily Vashti Guzman MD   100 mg at 09/11/18 0817   • sodium chloride 0.9 % flush 1-10 mL  1-10 mL Intravenous PRN Vashti Guzman MD       • sodium chloride 0.9 % infusion  25 mL/hr Intravenous Continuous Vashti Guzman MD 25 mL/hr at 09/11/18 1700 25 mL/hr at 09/11/18 1700   • tiZANidine (ZANAFLEX) tablet 4 mg  4 mg Oral Q8H PRVashti Griffith MD             Assessment/Plan     Mrs. Alcazar remains in sinus rhythm.  Continue IV amiodarone until  infusion complete, and start by mouth amiodarone 200 mg twice a day.  This may be continued for one week and the dose of amiodarone could be decreased to 200 mg daily thereafter.  Anticoagulation with Xarelto may be continued.  Restart lisinopril 20 mg daily for optimization of blood pressure.  Statin therapy will be continued.    The patient may follow-up with her primary cardiologist at [facility] in [place] in 1-2 weeks.  A repeat pulmonary vein isolation could be discussed.  May be discharged home later today if stable, from a cardiac standpoint.    Principal Problem:    Atrial fibrillation with RVR (CMS/HCC)  Active Problems:    Arthralgia of right hip    Type 2 diabetes mellitus without complication, without long-term current use of insulin (CMS/[facility])    Class 3 obesity due to excess calories with serious comorbidity and body mass index (BMI) of 45.0 to 49.9 in adult (CMS/[facility])    Polypharmacy        Lillie Velez MD  09/12/18  8:05 AM

## 2018-09-13 VITALS
WEIGHT: 265.8 LBS | TEMPERATURE: 97.9 F | OXYGEN SATURATION: 92 % | DIASTOLIC BLOOD PRESSURE: 68 MMHG | BODY MASS INDEX: 45.38 KG/M2 | HEART RATE: 70 BPM | RESPIRATION RATE: 18 BRPM | HEIGHT: 64 IN | SYSTOLIC BLOOD PRESSURE: 136 MMHG

## 2018-09-13 PROBLEM — I48.91 ATRIAL FIBRILLATION WITH RVR: Status: RESOLVED | Noted: 2018-09-10 | Resolved: 2018-09-13

## 2018-09-13 LAB
ANION GAP SERPL CALCULATED.3IONS-SCNC: 8 MMOL/L (ref 5–15)
BASOPHILS # BLD AUTO: 0.03 10*3/MM3 (ref 0–0.2)
BASOPHILS NFR BLD AUTO: 0.5 % (ref 0–2)
BUN BLD-MCNC: 12 MG/DL (ref 7–21)
BUN/CREAT SERPL: 15.6 (ref 7–25)
CALCIUM SPEC-SCNC: 8.6 MG/DL (ref 8.4–10.2)
CHLORIDE SERPL-SCNC: 104 MMOL/L (ref 95–110)
CO2 SERPL-SCNC: 29 MMOL/L (ref 22–31)
CREAT BLD-MCNC: 0.77 MG/DL (ref 0.5–1)
DEPRECATED RDW RBC AUTO: 41.4 FL (ref 36.4–46.3)
EOSINOPHIL # BLD AUTO: 0.22 10*3/MM3 (ref 0–0.7)
EOSINOPHIL NFR BLD AUTO: 3.4 % (ref 0–7)
ERYTHROCYTE [DISTWIDTH] IN BLOOD BY AUTOMATED COUNT: 12.9 % (ref 11.5–14.5)
GFR SERPL CREATININE-BSD FRML MDRD: 74 ML/MIN/1.73 (ref 39–90)
GLUCOSE BLD-MCNC: 117 MG/DL (ref 60–100)
GLUCOSE BLDC GLUCOMTR-MCNC: 115 MG/DL (ref 70–130)
GLUCOSE BLDC GLUCOMTR-MCNC: 127 MG/DL (ref 70–130)
HCT VFR BLD AUTO: 36.6 % (ref 35–45)
HGB BLD-MCNC: 12.3 G/DL (ref 12–15.5)
IMM GRANULOCYTES # BLD: 0.01 10*3/MM3 (ref 0–0.02)
IMM GRANULOCYTES NFR BLD: 0.2 % (ref 0–0.5)
LYMPHOCYTES # BLD AUTO: 1.92 10*3/MM3 (ref 0.6–4.2)
LYMPHOCYTES NFR BLD AUTO: 29.4 % (ref 10–50)
MCH RBC QN AUTO: 29.9 PG (ref 26.5–34)
MCHC RBC AUTO-ENTMCNC: 33.6 G/DL (ref 31.4–36)
MCV RBC AUTO: 88.8 FL (ref 80–98)
MONOCYTES # BLD AUTO: 0.52 10*3/MM3 (ref 0–0.9)
MONOCYTES NFR BLD AUTO: 8 % (ref 0–12)
NEUTROPHILS # BLD AUTO: 3.84 10*3/MM3 (ref 2–8.6)
NEUTROPHILS NFR BLD AUTO: 58.5 % (ref 37–80)
PLATELET # BLD AUTO: 216 10*3/MM3 (ref 150–450)
PMV BLD AUTO: 8.6 FL (ref 8–12)
POTASSIUM BLD-SCNC: 3.8 MMOL/L (ref 3.5–5.1)
RBC # BLD AUTO: 4.12 10*6/MM3 (ref 3.77–5.16)
SODIUM BLD-SCNC: 141 MMOL/L (ref 137–145)
WBC NRBC COR # BLD: 6.54 10*3/MM3 (ref 3.2–9.8)

## 2018-09-13 PROCEDURE — 85025 COMPLETE CBC W/AUTO DIFF WBC: CPT | Performed by: FAMILY MEDICINE

## 2018-09-13 PROCEDURE — 80048 BASIC METABOLIC PNL TOTAL CA: CPT | Performed by: FAMILY MEDICINE

## 2018-09-13 PROCEDURE — 82962 GLUCOSE BLOOD TEST: CPT

## 2018-09-13 RX ORDER — AMIODARONE HYDROCHLORIDE 200 MG/1
TABLET ORAL
Qty: 37 TABLET | Refills: 0 | Status: SHIPPED | OUTPATIENT
Start: 2018-09-13 | End: 2018-10-12 | Stop reason: SDUPTHER

## 2018-09-13 RX ADMIN — SERTRALINE HYDROCHLORIDE 100 MG: 50 TABLET ORAL at 09:17

## 2018-09-13 RX ADMIN — AMIODARONE HYDROCHLORIDE 200 MG: 200 TABLET ORAL at 09:17

## 2018-09-13 RX ADMIN — OXYCODONE HYDROCHLORIDE AND ACETAMINOPHEN 1 TABLET: 10; 325 TABLET ORAL at 06:31

## 2018-09-13 RX ADMIN — LISINOPRIL 20 MG: 20 TABLET ORAL at 09:17

## 2018-09-13 RX ADMIN — FUROSEMIDE 20 MG: 20 TABLET ORAL at 09:17

## 2018-09-13 NOTE — DISCHARGE SUMMARY
[address] [address]  T - [phone]     DISCHARGE SUMMARY         PATIENT  DEMOGRAPHICS   PATIENT NAME: Juliana Alcazar                      PHYSICIAN: Shelley Dubose MD  : 1945  MRN: [id number]    ADMISSION/DISCHARGE INFO   ADMISSION DATE: 9/10/2018   DISCHARGE DATE: 18    ADMISSION DIAGNOSES: Atrial fibrillation with RVR (CMS/[facility]) [I48.91]  DISCHARGE DIAGNOSES:    Active Problems:    Arthralgia of right hip    Type 2 diabetes mellitus without complication, without long-term current use of insulin (CMS/[facility])    Class 3 obesity due to excess calories with serious comorbidity and body mass index (BMI) of 45.0 to 49.9 in adult (CMS/[facility])    Polypharmacy      SERVICE: Family Medicine  ATTENDING PROVIDER: Dr. Leary  RESIDENT: Shelley Dubose MD     CONSULTS   Consult Orders (all)     Start     Ordered    18 07  Inpatient Cardiology Consult  Once     Specialty:  Cardiology  Provider:  Lillie Velez MD    18 0720    09/10/18 2000  Inpatient Nutrition Consult  Once     Provider:  (Not yet assigned)    09/10/18 2005          PROCEDURES     Imaging Results (last 24 hours)     ** No results found for the last 24 hours. **          Xr Chest 1 View    Result Date: 9/10/2018  Narrative: EXAM:         Radiograph(s), Chest VIEWS:   Frontal  ; 1     DATE/TIME:  9/10/2018 3:42 PM CDT            INDICATION:   chest discomfort, atrial fib  COMPARISON:  CXR: 17         FINDINGS:         - lines/tubes:    none   - cardiac:         size within normal limits       - mediastinum: contour within normal limits       - lungs:         no focal air space process, pulmonary interstitial edema, nodule(s)/mass           - pleura:         no evidence of  fluid                - osseous:         unremarkable for age                - misc.:        Impression: CONCLUSION:    1. No evidence of an active cardiopulmonary process.                                                   Electronically signed by:  SUJATA Pruitt MD  9/10/2018 3:54 PM CDT Workstation: [id number]      HISTORY OF PRESENT ILLNESS   Per Dr. Guzman at admission on 9/11/18:    Juliana Alcazar is a 72 y.o. morbidly obese  female with a concurrent medical history of paroxysmal atrial fibrillation.  Patient has a past medical history of cardiac ablation in December 2013 and electrocardioversion in March 2018.  Her cardiologist is Dr. Juan Nielsen at [facility] in [place], [place].  Her paroxysmal atrial fibrillation is controlled via rhythm control with Rythmol TID and full anticoagulation with Xarelto.      This morning patient woke up feeling diaphoretic and short of breath that worsened with activity.  Symptoms were associated with generalized weakness and malaise.  Patient reports symptoms were similar to prior episodes of atrial fibrillation.  She checked her blood pressure with an automated sphygmomanometer at home, blood pressure was 88/40 and heart rate was 116.  She then took a dose of her Rythmol which was due at 8 AM.  When symptoms persisted patient presented to the ED for further evaluation.     In the ED patient was found to be in atrial fibrillation with rapid ventricular response, heart rate ranged from 110-120 bpm.  Patient received 1 dose of IV Lopressor 5 mg that was successful in reducing heart rate to 80-90 bpm.  However tachyarrhythmia persisted and patient was started on a Cardizem infusion.  Patient was admitted to the telemetry floor for further management of her atrial fibrillation with RVR.      DIAGNOSTIC DATA   Adult Transthoracic Echo 9/11/18  · Left ventricular wall thickness is consistent with mild concentric hypertrophy.  · Left ventricular systolic function is normal. Estimated EF = 55%.  · Left atrial cavity size is moderately dilated.  · Mild tricuspid valve regurgitation is present.    HOSPITAL COURSE   Pt is a 72 y.o. Obese female with  "[facility] of paroxysmal atrial fibrillation. She has a hx of cardiac ablation in December 2013 and electrocardioversion in March 2018. Her paroxysmal atrial fibrillation is controlled with Rhythmol TID for rhythm control and anticoagulation with Xarelto.      She presented to the ED on 9/10/18 complaining of SOA and diaphoresis upon awakening that morning that worsened with exertion. Pt has associated weakness and malaise and stated that these symptoms were similar to her prior episodes of atrial fibrillation. In ED pt was found to be in atrial fibrillation with RVR and a heart rate of 110-120 bpm. She received a dose of IV Lopressor 5mg and heart rate came down to 80-90 bpm for a short time but tachyarrhythmia then returned.  PT was started on Cardizem infusion and admitted to the telemetry floor for further management. Troponins were negative x3.  Pt's initial dose of Cardizem caused hypotension so dose was titrated to 2.5mg.      On the following day, pt stated that her symptoms were much improved. She did have a singular episode of \"feeling her heart beating in her chest\" while walking to the bathroom but this did not recur. Cardiology was consulted and Dr. Trujillo was following. He recommended electrical cardioversion and secondary pulmonary vein isolation due to recurrence of symptoms associated with hypotension. Pt stated she would need time to think this over.  PT wa started on amiodarone infusion in the PM.    Pt's rhythm corrected to normal sinus while on amiodarone infusion. She was transitioned to PO amiodarone in anticipation of discharge the following day.  On 9/12/18 however the pt had an episode of vomiting with a rise in her BP and discharge was held to allow the patient to stabilize. She was restarted on Lisinopril after clearance from cardiology. On 9/13/18 pt was reexamined and found to be doing much better. She did not endorse any new episodes of nausea and vomiting and BP was stable.      DISCHARGE " CONDITION   Stable    DISPOSITION   To Home      DISCHARGE MEDICATIONS        Your medication list      START taking these medications      Instructions Last Dose Given Next Dose Due   amiodarone 200 MG tablet  Commonly known as:  PACERONE      Take 1 tablet by mouth every 12 hours for 1 week. Then take 1 tablet by mouth daily          CONTINUE taking these medications      Instructions Last Dose Given Next Dose Due   atorvastatin 40 MG tablet  Commonly known as:  LIPITOR      Take 1 tablet by mouth Every Night.       diclofenac 75 MG EC tablet  Commonly known as:  VOLTAREN      Take 1 tablet by mouth 2 (Two) Times a Day.       fenofibrate 54 MG tablet  Commonly known as:  LOFIBRA      Take 1 tablet by mouth Daily.       furosemide 20 MG tablet  Commonly known as:  LASIX      Take 1 tablet by mouth Daily.       lisinopril 20 MG tablet  Commonly known as:  PRINIVIL,ZESTRIL      Take 1 tablet by mouth Daily.       metFORMIN 500 MG tablet  Commonly known as:  GLUCOPHAGE      Take 1 tablet by mouth Daily With Breakfast.       oxyCODONE-acetaminophen  MG per tablet  Commonly known as:  PERCOCET      Take 1 tablet by mouth Every 6 (Six) Hours As Needed for Moderate Pain .       propafenone 150 MG tablet  Commonly known as:  RYTHMOL      Take 150 mg by mouth Every 8 (Eight) Hours.       rivaroxaban 20 MG tablet  Commonly known as:  XARELTO      Take 1 tablet by mouth Daily.       sertraline 100 MG tablet  Commonly known as:  ZOLOFT      1 tab in the am and 1/2 in the pm       terbinafine 1 % cream  Commonly known as:  LAMISIL AT      Apply  topically to the appropriate area as directed 2 (Two) Times a Day.       tiZANidine 4 MG tablet  Commonly known as:  ZANAFLEX      Take 1 tablet by mouth Every 8 (Eight) Hours As Needed for Muscle Spasms.             Where to Get Your Medications      These medications were sent to [facility] [address] [place] [address] [phone] [facility] [phone] [facility]  [address], [place] [id number]    Phone:  [phone] ·   amiodarone 200 MG tablet         INSTRUCTIONS   Activity:   Activity Instructions     Activity as Tolerated             Diet:   Diet Instructions     Diet: Regular, Consistent Carbohydrate       Discharge Diet:   Regular  Consistent Carbohydrate             Special Instructions: Patient instructed to call M.D. or return to ED with worsening shortness of breath, chest pain, fever greater than 100.4°F or any other medical concerns.    FOLLOW UP   Additional Instructions for the Follow-ups that You Need to Schedule     Call MD With Problems / Concerns    As directed      Instructions: Call if symptoms return, or if any other concerns    Order Comments:  Instructions: Call if symptoms return, or if any other concerns          Discharge Follow-up with Specified Provider: Dr. Juan Nielsen; 1 Day    As directed      To:  Dr. Juan Nielsen    Follow Up:  1 Day    Follow Up Details:  Pt to follow up with cardiologist for scheduled appointment tomorrow 10 AM in [place]           Follow-up Information     Miranda Llanes APRN .    Specialty:  Family Medicine  Contact information:  [facility] [address]  [place] [id number] [phone]                  PENDING TEST RESULTS AT DISCHARGE      TIME   Time: 30 minutes were spent planning this discharge.          Dr. Leary is the attending at time of discharge, He is aware of the patient's status and agrees with the above discharge summary.

## 2018-09-13 NOTE — PLAN OF CARE
Problem: Patient Care Overview  Goal: Plan of Care Review  Outcome: Ongoing (interventions implemented as appropriate)   09/13/18 2733   Coping/Psychosocial   Plan of Care Reviewed With patient   Plan of Care Review   Progress improving   OTHER   Outcome Summary Pt VSS, HR-NSR, No CP noted       Problem: Arrhythmia/Dysrhythmia (Symptomatic) (Adult)  Goal: Signs and Symptoms of Listed Potential Problems Will be Absent, Minimized or Managed (Arrhythmia/Dysrhythmia)  Outcome: Ongoing (interventions implemented as appropriate)      Problem: Pain, Chronic (Adult)  Goal: Acceptable Pain/Comfort Level and Functional Ability  Outcome: Ongoing (interventions implemented as appropriate)

## 2018-09-13 NOTE — PROGRESS NOTES
FAMILY MEDICINE DAILY PROGRESS NOTE  NAME: Juliana Alcazar  : 1945  MRN: [id number]     LOS: 3 days     PROVIDER OF SERVICE: Shelley Dubose MD    Chief Complaint: Atrial fibrillation with RVR (CMS/HCC)    Subjective:     Interval History:  History taken from: patient  Pt is a 72 y.o. Female who presented to ED 2 days ago for rapid heart rate for 2-3 days. PT was found to be in atrial fibrillation with RVR and underlying left bundle branch block.  Her BP at admission was 90/50 mmHg. Her atrial fibrillation corrected 2 days ago spontaneously and Dr. Trujillo started her on IV Amiodarone, this has now been switched to PO. Pt states since starting her Amiodarone she is feeling much better. Dr Trujillo has restarted her on her Lisinopril due to rising BP. She had an episode of vomiting yesterday, but states that this has not recurred. She  does not express any other concerns. She denies any nausea, vomiting, diarrhea, constipation.  Review of Systems:   Review of Systems   Constitutional: Negative for activity change, chills, diaphoresis and fever.   HENT: Negative for ear pain, facial swelling, hearing loss, rhinorrhea, sinus pain, sneezing, sore throat and tinnitus.    Eyes: Negative for discharge and redness.   Respiratory: Negative for cough, chest tightness, shortness of breath and wheezing.    Cardiovascular: Negative for chest pain and palpitations.   Gastrointestinal: Negative for abdominal pain, constipation, diarrhea, nausea and vomiting.   Genitourinary: Negative for dysuria, frequency and urgency.   Musculoskeletal: Positive for arthralgias (Left knee pain) and back pain (Chronic).   Skin: Negative for rash and wound.   Neurological: Negative for tremors, syncope, speech difficulty and headaches.   Psychiatric/Behavioral: Negative for agitation, behavioral problems and confusion. The patient is not nervous/anxious.        Objective:     Vital Signs  Temp:  [97.5 °F (36.4 °C)-99.5 °F (37.5 °C)] 97.6 °F  (36.4 °C)  Heart Rate:  [68-88] 83  Resp:  [18] 18  BP: (127-203)/(60-94) 140/71    Physical Exam  Physical Exam   Constitutional: She is oriented to person, place, and time. She appears well-developed and well-nourished. No distress.   HENT:   Head: Normocephalic and atraumatic.   Right Ear: External ear normal.   Left Ear: External ear normal.   Eyes: Conjunctivae and EOM are normal. Right eye exhibits no discharge. Left eye exhibits no discharge. No scleral icterus.   Neck: Normal range of motion.   Cardiovascular: Normal rate and regular rhythm.  Exam reveals no gallop and no friction rub.    No murmur heard.  Pulmonary/Chest: Effort normal and breath sounds normal. No stridor. No respiratory distress. She has no wheezes.   Abdominal: Soft. There is no tenderness.   Musculoskeletal: She exhibits no edema.   Neurological: She is alert and oriented to person, place, and time.   Skin: Skin is warm and dry. She is not diaphoretic. No erythema.   Psychiatric: She has a normal mood and affect. Her behavior is normal. Thought content normal.       Medication Review    Current Facility-Administered Medications:   •  amiodarone (PACERONE) tablet 200 mg, 200 mg, Oral, Q24H, Lillie Velez MD, 200 mg at 09/12/18 1629  •  atorvastatin (LIPITOR) tablet 40 mg, 40 mg, Oral, Nightly, Vashti Guzman MD, 40 mg at 09/12/18 2124  •  dextrose (GLUTOSE) oral gel 15 g, 15 g, Oral, Q15 Min PRN, Vashti Guzman MD  •  furosemide (LASIX) tablet 20 mg, 20 mg, Oral, Daily, Dilan Collins MD, 20 mg at 09/12/18 1004  •  glucagon (human recombinant) (GLUCAGEN DIAGNOSTIC) injection 1 mg, 1 mg, Subcutaneous, PRN, Vashti Guzman MD  •  insulin aspart (novoLOG) injection 0-9 Units, 0-9 Units, Subcutaneous, 4x Daily AC & at Bedtime, Vashti Guzman MD, Stopped at 09/12/18 0730  •  lisinopril (PRINIVIL,ZESTRIL) tablet 20 mg, 20 mg, Oral, Q24H, Lillie Velez MD, 20 mg at 09/12/18 0842  •   ondansetron (ZOFRAN) tablet 4 mg, 4 mg, Oral, Q6H PRN, 4 mg at 09/12/18 1824 **OR** ondansetron ODT (ZOFRAN-ODT) disintegrating tablet 4 mg, 4 mg, Oral, Q6H PRN **OR** ondansetron (ZOFRAN) injection 4 mg, 4 mg, Intravenous, Q6H PRN, Vashti Guzman MD, 4 mg at 09/12/18 0856  •  oxyCODONE-acetaminophen (PERCOCET)  MG per tablet 1 tablet, 1 tablet, Oral, Q6H PRN, Vashti Guzman MD, 1 tablet at 09/13/18 0631  •  rivaroxaban (XARELTO) tablet 20 mg, 20 mg, Oral, Daily With Dinner, Vashti Guzman MD, 20 mg at 09/12/18 2125  •  sennosides-docusate sodium (SENOKOT-S) 8.6-50 MG tablet 2 tablet, 2 tablet, Oral, Nightly PRN, Vashti Guzman MD  •  sertraline (ZOLOFT) tablet 100 mg, 100 mg, Oral, Daily, Vashti Guzman MD, 100 mg at 09/12/18 0841  •  tiZANidine (ZANAFLEX) tablet 4 mg, 4 mg, Oral, Q8H PRN, Vashti Guzman MD     Diagnostic Data    Lab Results (last 24 hours)     Procedure Component Value Units Date/Time    POC Glucose Once [[id number]]  (Normal) Collected:  09/13/18 0555    Specimen:  Blood Updated:  09/13/18 0609     Glucose 115 mg/dL      Comment: Result Not ConfirmedOperator: [id number] [facility] ID: [id number]       Basic Metabolic Panel [[id number]]  (Abnormal) Collected:  09/13/18 0525    Specimen:  Blood Updated:  09/13/18 0559     Glucose 117 (H) mg/dL      BUN 12 mg/dL      Creatinine 0.77 mg/dL      Sodium 141 mmol/L      Potassium 3.8 mmol/L      Chloride 104 mmol/L      CO2 29.0 mmol/L      Calcium 8.6 mg/dL      eGFR Non African Amer 74 mL/min/1.73      BUN/Creatinine Ratio 15.6     Anion Gap 8.0 mmol/L     Narrative:       The MDRD GFR formula is only valid for adults with stable renal function between ages 18 and 70.    CBC & Differential [[id number]] Collected:  09/13/18 0525    Specimen:  Blood Updated:  09/13/18 0545    Narrative:       The following orders were created for panel order CBC & Differential.  Procedure                                Abnormality         Status                     ---------                               -----------         ------                     CBC Auto Differential[[id number]]        Normal              Final result                 Please view results for these tests on the individual orders.    CBC Auto Differential [[id number]]  (Normal) Collected:  09/13/18 0525    Specimen:  Blood Updated:  09/13/18 0545     WBC 6.54 10*3/mm3      RBC 4.12 10*6/mm3      Hemoglobin 12.3 g/dL      Hematocrit 36.6 %      MCV 88.8 fL      MCH 29.9 pg      MCHC 33.6 g/dL      RDW 12.9 %      RDW-SD 41.4 fl      MPV 8.6 fL      Platelets 216 10*3/mm3      Neutrophil % 58.5 %      Lymphocyte % 29.4 %      Monocyte % 8.0 %      Eosinophil % 3.4 %      Basophil % 0.5 %      Immature Grans % 0.2 %      Neutrophils, Absolute 3.84 10*3/mm3      Lymphocytes, Absolute 1.92 10*3/mm3      Monocytes, Absolute 0.52 10*3/mm3      Eosinophils, Absolute 0.22 10*3/mm3      Basophils, Absolute 0.03 10*3/mm3      Immature Grans, Absolute 0.01 10*3/mm3     POC Glucose Once [[id number]]  (Normal) Collected:  09/12/18 2100    Specimen:  Blood Updated:  09/12/18 2239     Glucose 122 mg/dL      Comment: Sliding Scale AdminOperator: [id number] [facility] ID: [id number]       POC Glucose Once [[id number]]  (Normal) Collected:  09/12/18 1620    Specimen:  Blood Updated:  09/12/18 2105     Glucose 112 mg/dL      Comment: RN NotifiedOperator: [id number] SIDNEY Schneider ID: [id number]       POC Glucose Once [[id number]]  (Normal) Collected:  09/12/18 1213    Specimen:  Blood Updated:  09/12/18 1237     Glucose 116 mg/dL      Comment: RN NotifiedOperator: [id number] SIDNEY OROZCOMeter ID: [id number]              Imaging Results (last 24 hours)     ** No results found for the last 24 hours. **          I reviewed the patient's new clinical results.    Assessment/Plan:     Hospital Problem List     * (Principal)Atrial fibrillation with RVR (CMS/HCC)     Overview Addendum 9/13/2018  7:42 AM by Shelley Dubose MD     -Cardiology following. Seeing Dr. Trujillo, he started her on IV Amiodarone, this has now been transitioned to PO  -ECHO ordered to evaluate cardiac function  · Left ventricular wall thickness is consistent with mild concentric hypertrophy.  · Left ventricular systolic function is normal. Estimated EF = 55%.  · Left atrial cavity size is moderately dilated.  · Mild tricuspid valve regurgitation is present.  -Monitoring cardiac activity with telemetry  -CHADSVASc: 4 - continue anticoagulation with Xarelto  -Patient was on rhythm control at home: Rythmol TID  -TSH and Mg+2 WNL         Arthralgia of right hip    Overview Addendum 9/11/2018 12:09 AM by Vashti Guzman MD     -Continue Percocet 10mg PO PRN  -[facility]ACE report #[id number] obtained; consistent with patient's prescribed medications         Type 2 diabetes mellitus without complication, without long-term current use of insulin (CMS/[facility])    Overview Addendum 9/12/2018  7:19 AM by Shelley Dubose MD     -Last HbA1c was 6.2  -Will hold metformin during IP course  -ADA diet recommended; will consult nutrition/dietary  -SSI provided with regular fingersticks to monitor glucose         Class 3 obesity due to excess calories with serious comorbidity and body mass index (BMI) of 45.0 to 49.9 in adult (CMS/[facility])    Overview Signed 9/11/2018 12:11 AM by Vashti Guzman MD     -Encourage lifestyle modifications such as portion control and regular physical activity  -We'll consult nutrition/dietary to educate patient         Polypharmacy            DVT prophylaxis: SCDs/TEDs  Code Status and Medical Interventions:   Ordered at: 09/10/18 2005     Level Of Support Discussed With:    Patient     Code Status:    CPR     Medical Interventions (Level of Support Prior to Arrest):    Full       Plan for disposition:Where: home and When:  symptoms improve      Time: 15 minutes        This document has been  electronically signed by Shelley Dubose MD on September 13, 2018 7:42 AM

## 2018-09-13 NOTE — MEDICAL STUDENT
FAMILY MEDICINE DAILY PROGRESS NOTE  NAME: Juliana Alcazar  : 1945  MRN: [id number]     LOS: 3 days     PROVIDER OF SERVICE: Danya Chamorro    Chief Complaint: Atrial fibrillation with RVR (CMS/HCC)    Subjective:     Interval History:  History taken from: patient chart  Mrs. Alcazar is a 73 yo female with a history of paroxysmal atrial fibrillation, HTN, hyperlipidemia, sleep apnea, and obesity, who presented with atrial fibrillation w/RVR and LBBB. She spontaneously converted to normal sinus rhythm and was started on Amiodarone. Her blood pressure was elevated yesterday morning and after starting Lisinopril, she had two episodes of emesis. Her blood pressure is well controlled today. She woke up this morning with a non-radiating, dull, aching headache behind her right eye that she rated a 7/10 on the pain scale. She denies nausea, vomiting, lightheadedness, and dizziness. Otherwise, she reports feeling much better since starting the Amiodarone. She denies chest tightness, palpitations, and dyspnea.     Review of Systems:   Review of Systems   Constitutional: Negative for chills, diaphoresis, fatigue and fever.   HENT: Negative for facial swelling, sinus pain, sinus pressure and trouble swallowing.    Eyes: Negative for pain and visual disturbance.   Respiratory: Negative for chest tightness and shortness of breath.    Cardiovascular: Negative for chest pain, palpitations and leg swelling.   Gastrointestinal: Negative for abdominal pain, diarrhea, nausea and vomiting.   Genitourinary: Negative for difficulty urinating, dysuria and frequency.   Musculoskeletal: Positive for arthralgias and back pain.   Skin: Negative for color change, pallor and rash.   Neurological: Positive for headaches. Negative for dizziness and light-headedness.        Dull, aching pain behind right eye.   Psychiatric/Behavioral: Negative for confusion. The patient is not nervous/anxious.        Objective:     Vital Signs  Temp:  [97.5  °F (36.4 °C)-99.5 °F (37.5 °C)] 97.6 °F (36.4 °C)  Heart Rate:  [68-88] 83  Resp:  [18] 18  BP: (127-203)/(60-94) 140/71    Physical Exam  Physical Exam   Constitutional: She is oriented to person, place, and time. She appears well-developed and well-nourished.   HENT:   Head: Normocephalic and atraumatic.   Right Ear: External ear normal.   Left Ear: External ear normal.   Eyes: Conjunctivae and EOM are normal.   Neck: Normal range of motion. Neck supple.   Cardiovascular: Normal rate and intact distal pulses.    Pulmonary/Chest: Effort normal and breath sounds normal.   Abdominal: Soft. Bowel sounds are normal.   Musculoskeletal: Normal range of motion. She exhibits no edema.   Neurological: She is alert and oriented to person, place, and time.   Skin: Skin is warm and dry.   Psychiatric: She has a normal mood and affect. Her behavior is normal. Judgment and thought content normal.       Medication Review    Current Facility-Administered Medications:   •  amiodarone (PACERONE) tablet 200 mg, 200 mg, Oral, Q24H, Lillie Velez MD, 200 mg at 09/12/18 1629  •  atorvastatin (LIPITOR) tablet 40 mg, 40 mg, Oral, Nightly, Vashti Guzman MD, 40 mg at 09/12/18 2124  •  dextrose (GLUTOSE) oral gel 15 g, 15 g, Oral, Q15 Min PRN, Vashti Guzman MD  •  furosemide (LASIX) tablet 20 mg, 20 mg, Oral, Daily, Dilan Collins MD, 20 mg at 09/12/18 1004  •  glucagon (human recombinant) (GLUCAGEN DIAGNOSTIC) injection 1 mg, 1 mg, Subcutaneous, PRN, Vashti Guzman MD  •  insulin aspart (novoLOG) injection 0-9 Units, 0-9 Units, Subcutaneous, 4x Daily AC & at Bedtime, Vashti Guzman MD, Stopped at 09/12/18 0730  •  lisinopril (PRINIVIL,ZESTRIL) tablet 20 mg, 20 mg, Oral, Q24H, Lillie Velez MD, 20 mg at 09/12/18 4402  •  ondansetron (ZOFRAN) tablet 4 mg, 4 mg, Oral, Q6H PRN, 4 mg at 09/12/18 1824 **OR** ondansetron ODT (ZOFRAN-ODT) disintegrating tablet 4 mg, 4 mg, Oral, Q6H PRN  **OR** ondansetron (ZOFRAN) injection 4 mg, 4 mg, Intravenous, Q6H PRN, Vashti Guzman MD, 4 mg at 09/12/18 0856  •  oxyCODONE-acetaminophen (PERCOCET)  MG per tablet 1 tablet, 1 tablet, Oral, Q6H PRN, Vashti Guzman MD, 1 tablet at 09/13/18 0631  •  rivaroxaban (XARELTO) tablet 20 mg, 20 mg, Oral, Daily With Dinner, Vashti Guzman MD, 20 mg at 09/12/18 2125  •  sennosides-docusate sodium (SENOKOT-S) 8.6-50 MG tablet 2 tablet, 2 tablet, Oral, Nightly PRN, Vashti Guzman MD  •  sertraline (ZOLOFT) tablet 100 mg, 100 mg, Oral, Daily, Vashti Guzman MD, 100 mg at 09/12/18 0841  •  tiZANidine (ZANAFLEX) tablet 4 mg, 4 mg, Oral, Q8H PRN, Vashti Guzman MD     Diagnostic Data    Lab Results (last 24 hours)     Procedure Component Value Units Date/Time    POC Glucose Once [[id number]]  (Normal) Collected:  09/13/18 0555    Specimen:  Blood Updated:  09/13/18 0609     Glucose 115 mg/dL      Comment: Result Not ConfirmedOperator: [id number] [facility] ID: [id number]       Basic Metabolic Panel [[id number]]  (Abnormal) Collected:  09/13/18 0525    Specimen:  Blood Updated:  09/13/18 0559     Glucose 117 (H) mg/dL      BUN 12 mg/dL      Creatinine 0.77 mg/dL      Sodium 141 mmol/L      Potassium 3.8 mmol/L      Chloride 104 mmol/L      CO2 29.0 mmol/L      Calcium 8.6 mg/dL      eGFR Non African Amer 74 mL/min/1.73      BUN/Creatinine Ratio 15.6     Anion Gap 8.0 mmol/L     Narrative:       The MDRD GFR formula is only valid for adults with stable renal function between ages 18 and 70.    CBC & Differential [[id number]] Collected:  09/13/18 0525    Specimen:  Blood Updated:  09/13/18 0545    Narrative:       The following orders were created for panel order CBC & Differential.  Procedure                               Abnormality         Status                     ---------                               -----------         ------                     CBC Auto  Differential[[id number]]        Normal              Final result                 Please view results for these tests on the individual orders.    CBC Auto Differential [[id number]]  (Normal) Collected:  09/13/18 0525    Specimen:  Blood Updated:  09/13/18 0545     WBC 6.54 10*3/mm3      RBC 4.12 10*6/mm3      Hemoglobin 12.3 g/dL      Hematocrit 36.6 %      MCV 88.8 fL      MCH 29.9 pg      MCHC 33.6 g/dL      RDW 12.9 %      RDW-SD 41.4 fl      MPV 8.6 fL      Platelets 216 10*3/mm3      Neutrophil % 58.5 %      Lymphocyte % 29.4 %      Monocyte % 8.0 %      Eosinophil % 3.4 %      Basophil % 0.5 %      Immature Grans % 0.2 %      Neutrophils, Absolute 3.84 10*3/mm3      Lymphocytes, Absolute 1.92 10*3/mm3      Monocytes, Absolute 0.52 10*3/mm3      Eosinophils, Absolute 0.22 10*3/mm3      Basophils, Absolute 0.03 10*3/mm3      Immature Grans, Absolute 0.01 10*3/mm3     POC Glucose Once [[id number]]  (Normal) Collected:  09/12/18 2100    Specimen:  Blood Updated:  09/12/18 2239     Glucose 122 mg/dL      Comment: Sliding Scale AdminOperator: [id number] [facility] ID: [id number]       POC Glucose Once [[id number]]  (Normal) Collected:  09/12/18 1620    Specimen:  Blood Updated:  09/12/18 2105     Glucose 112 mg/dL      Comment: RN NotifiedOperator: [id number] SIDNEY Schneider ID: [id number]       POC Glucose Once [[id number]]  (Normal) Collected:  09/12/18 1213    Specimen:  Blood Updated:  09/12/18 1237     Glucose 116 mg/dL      Comment: RN NotifiedOperator: [id number] SIDNEY OROZCOMeter ID: [id number]              Imaging Results (last 24 hours)     ** No results found for the last 24 hours. **          I reviewed the patient's new clinical results.    Assessment/Plan:     Hospital Problem List     * (Principal)Atrial fibrillation with RVR (CMS/HCC)    Overview Addendum 9/12/2018  7:17 AM by Shelley Dubose MD     -Cardiology following. Seeing Dr. Trujillo, he started her on Amiodarone yesterday  -ECHO  ordered to evaluate cardiac function  · Left ventricular wall thickness is consistent with mild concentric hypertrophy.  · Left ventricular systolic function is normal. Estimated EF = 55%.  · Left atrial cavity size is moderately dilated.  · Mild tricuspid valve regurgitation is present.  -Monitoring cardiac activity with telemetry  -CHADSVASc: 4 - continue anticoagulation with Xarelto  -Patient was on rhythm control at home: Rythmol TID  -TSH and Mg+2 WNL         Arthralgia of right hip    Overview Addendum 9/11/2018 12:09 AM by Vashti Guzman MD     -Continue Percocet 10mg PO PRN  -[facility] report #[id number] obtained; consistent with patient's prescribed medications         Type 2 diabetes mellitus without complication, without long-term current use of insulin (CMS/[facility])    Overview Addendum 9/12/2018  7:19 AM by Shelley Dubose MD     -Last HbA1c was 6.2  -Will hold metformin during IP course  -ADA diet recommended; will consult nutrition/dietary  -SSI provided with regular fingersticks to monitor glucose         Class 3 obesity due to excess calories with serious comorbidity and body mass index (BMI) of 45.0 to 49.9 in adult (CMS/[facility])    Overview Signed 9/11/2018 12:11 AM by Vashti Guzman MD     -Encourage lifestyle modifications such as portion control and regular physical activity  -We'll consult nutrition/dietary to educate patient         Polypharmacy            DVT prophylaxis: Xarelto  Code Status and Medical Interventions:   Ordered at: 09/10/18 2005     Level Of Support Discussed With:    Patient     Code Status:    CPR     Medical Interventions (Level of Support Prior to Arrest):    Full       Plan for disposition:Where: home and When:  today      Time: 15 min      This document has been electronically signed by Danya Chamorro on September 13, 2018 6:53 AM

## 2018-09-14 ENCOUNTER — READMISSION MANAGEMENT (OUTPATIENT)
Dept: CALL CENTER | Facility: HOSPITAL | Age: 73
End: 2018-09-14

## 2018-09-14 NOTE — OUTREACH NOTE
Prep Survey      Responses   Facility patient discharged from?  [facility]   Is patient eligible?  Yes   Discharge diagnosis  AFIB/RVR   Does the patient have one of the following disease processes/diagnoses(primary or secondary)?  Other   Does the patient have Home health ordered?  No   Is there a DME ordered?  No   Prep survey completed?  Yes          Karolyn Quinones RN

## 2018-09-18 ENCOUNTER — READMISSION MANAGEMENT (OUTPATIENT)
Dept: CALL CENTER | Facility: HOSPITAL | Age: 73
End: 2018-09-18

## 2018-09-18 NOTE — OUTREACH NOTE
Medical Week 1 Survey      Responses   Facility patient discharged from?  [facility]   Does the patient have one of the following disease processes/diagnoses(primary or secondary)?  Other   Is there a successful TCM telephone encounter documented?  No   Week 1 attempt successful?  No   Unsuccessful attempts  Attempt 1          Demetria Leon RN

## 2018-09-19 ENCOUNTER — TELEPHONE (OUTPATIENT)
Dept: FAMILY MEDICINE CLINIC | Facility: CLINIC | Age: 73
End: 2018-09-19

## 2018-09-19 NOTE — TELEPHONE ENCOUNTER
----- Message from Yumiko Barraza sent at 2018 11:15 AM CDT -----  PT IS NEEDING A PRESCRIPTION FOR HER CPAP SUPPLIES IT GOES TO [facility]   1945   PHONE [phone]

## 2018-09-20 ENCOUNTER — OFFICE VISIT (OUTPATIENT)
Dept: FAMILY MEDICINE CLINIC | Facility: CLINIC | Age: 73
End: 2018-09-20

## 2018-09-20 VITALS
TEMPERATURE: 97.5 F | DIASTOLIC BLOOD PRESSURE: 60 MMHG | HEIGHT: 64 IN | WEIGHT: 272.6 LBS | HEART RATE: 56 BPM | OXYGEN SATURATION: 97 % | SYSTOLIC BLOOD PRESSURE: 150 MMHG | BODY MASS INDEX: 46.54 KG/M2 | RESPIRATION RATE: 18 BRPM

## 2018-09-20 DIAGNOSIS — I48.91 ATRIAL FIBRILLATION, UNSPECIFIED TYPE (HCC): Primary | ICD-10-CM

## 2018-09-20 DIAGNOSIS — I10 ESSENTIAL HYPERTENSION: ICD-10-CM

## 2018-09-20 PROCEDURE — 99213 OFFICE O/P EST LOW 20 MIN: CPT | Performed by: NURSE PRACTITIONER

## 2018-09-20 RX ORDER — METOPROLOL TARTRATE 50 MG/1
TABLET, FILM COATED ORAL
Qty: 180 TABLET | Refills: 1 | Status: SHIPPED | OUTPATIENT
Start: 2018-09-20 | End: 2019-10-25

## 2018-09-20 NOTE — PROGRESS NOTES
Subjective   Juliana Alcazar is a 72 y.o. female.     Here today for nissa.  She has been in the hospital recently for exacerbation of her atrial fib.  She developed pressure in her chest and some shortness of breath.  She was started on amioderone.  She thinks she is doing better     Atrial Fibrillation   Symptoms include bradycardia, hypertension and palpitations. Symptoms are negative for an AICD problem, chest pain, dizziness, hemodynamic instability, hypotension, pacemaker problem, shortness of breath, syncope, tachycardia and weakness. The symptoms have been improving. Past medical history includes atrial fibrillation. There are no medication compliance problems.        The following portions of the patient's history were reviewed and updated as appropriate: allergies, current medications, past family history, past medical history, past social history, past surgical history and problem list.    Review of Systems   Constitutional: Negative.    HENT: Negative.    Respiratory: Negative.  Negative for shortness of breath.    Cardiovascular: Positive for palpitations. Negative for chest pain and syncope.   Gastrointestinal: Negative.    Musculoskeletal: Negative.    Neurological: Negative.  Negative for dizziness and weakness.   Psychiatric/Behavioral: Negative.        Objective   Physical Exam   Constitutional: She is oriented to person, place, and time. She appears well-developed and well-nourished. No distress.   HENT:   Head: Normocephalic.   Right Ear: External ear normal.   Left Ear: External ear normal.   Mouth/Throat: Oropharynx is clear and moist.   Eyes: Pupils are equal, round, and reactive to light.   Neck: Normal range of motion. Neck supple. No thyromegaly present.   Cardiovascular: Normal rate, regular rhythm and normal heart sounds.  Exam reveals no friction rub.    No murmur heard.  Pulmonary/Chest: Effort normal and breath sounds normal. No respiratory distress. She has no wheezes. She has no  rales.   Abdominal: Soft.   Musculoskeletal: Normal range of motion.   Neurological: She is alert and oriented to person, place, and time.   Skin: Skin is warm and dry.   Psychiatric: She has a normal mood and affect. Thought content normal.   Nursing note and vitals reviewed.        Assessment/Plan   Diagnoses and all orders for this visit:    Atrial fibrillation, unspecified type (CMS/HCC)    Essential hypertension  -     metoprolol tartrate (LOPRESSOR) 50 MG tablet; 1/2 tab bid

## 2018-09-21 ENCOUNTER — READMISSION MANAGEMENT (OUTPATIENT)
Dept: CALL CENTER | Facility: HOSPITAL | Age: 73
End: 2018-09-21

## 2018-09-21 NOTE — OUTREACH NOTE
Medical Week 1 Survey      Responses   Facility patient discharged from?  [place]   Does the patient have one of the following disease processes/diagnoses(primary or secondary)?  Other   Is there a successful TCM telephone encounter documented?  No   Week 1 attempt successful?  Yes   Call start time  1751   Revoke  Decline to participate   Call end time  1751   Discharge diagnosis  AFIB/RVR          Maribell Juan RN

## 2018-10-12 RX ORDER — AMIODARONE HYDROCHLORIDE 200 MG/1
200 TABLET ORAL DAILY
Qty: 90 TABLET | Refills: 1 | Status: SHIPPED | OUTPATIENT
Start: 2018-10-12 | End: 2018-10-15 | Stop reason: SDUPTHER

## 2018-10-15 RX ORDER — AMIODARONE HYDROCHLORIDE 200 MG/1
200 TABLET ORAL DAILY
Qty: 90 TABLET | Refills: 1 | Status: SHIPPED | OUTPATIENT
Start: 2018-10-15 | End: 2019-10-25

## 2018-11-13 ENCOUNTER — APPOINTMENT (OUTPATIENT)
Dept: GENERAL RADIOLOGY | Facility: HOSPITAL | Age: 73
End: 2018-11-13

## 2018-11-13 ENCOUNTER — HOSPITAL ENCOUNTER (OUTPATIENT)
Facility: HOSPITAL | Age: 73
Setting detail: OBSERVATION
Discharge: HOME | End: 2018-11-15
Attending: EMERGENCY MEDICINE | Admitting: EMERGENCY MEDICINE

## 2018-11-13 DIAGNOSIS — R07.9 CHEST PAIN, UNSPECIFIED TYPE: Primary | ICD-10-CM

## 2018-11-13 LAB
ALBUMIN SERPL-MCNC: 3.9 G/DL (ref 3.4–4.8)
ALBUMIN/GLOB SERPL: 1.6 G/DL (ref 1.1–1.8)
ALP SERPL-CCNC: 53 U/L (ref 38–126)
ALT SERPL W P-5'-P-CCNC: 23 U/L (ref 9–52)
ANION GAP SERPL CALCULATED.3IONS-SCNC: 8 MMOL/L (ref 5–15)
APTT PPP: 39.6 SECONDS (ref 20–40.3)
AST SERPL-CCNC: 22 U/L (ref 14–36)
BASOPHILS # BLD AUTO: 0.01 10*3/MM3 (ref 0–0.2)
BASOPHILS NFR BLD AUTO: 0.1 % (ref 0–2)
BILIRUB SERPL-MCNC: 0.5 MG/DL (ref 0.2–1.3)
BUN BLD-MCNC: 15 MG/DL (ref 7–21)
BUN/CREAT SERPL: 17.2 (ref 7–25)
CALCIUM SPEC-SCNC: 9.1 MG/DL (ref 8.4–10.2)
CHLORIDE SERPL-SCNC: 101 MMOL/L (ref 95–110)
CK MB SERPL-CCNC: 1.15 NG/ML (ref 0–5)
CK SERPL-CCNC: 39 U/L (ref 30–135)
CO2 SERPL-SCNC: 28 MMOL/L (ref 22–31)
CREAT BLD-MCNC: 0.87 MG/DL (ref 0.5–1)
D-DIMER, QUANTITATIVE (MAD,POW, STR): 354 NG/ML (FEU) (ref 0–470)
DEPRECATED RDW RBC AUTO: 42.4 FL (ref 36.4–46.3)
EOSINOPHIL # BLD AUTO: 0.25 10*3/MM3 (ref 0–0.7)
EOSINOPHIL NFR BLD AUTO: 3.7 % (ref 0–7)
ERYTHROCYTE [DISTWIDTH] IN BLOOD BY AUTOMATED COUNT: 13.1 % (ref 11.5–14.5)
GFR SERPL CREATININE-BSD FRML MDRD: 64 ML/MIN/1.73 (ref 39–90)
GLOBULIN UR ELPH-MCNC: 2.5 GM/DL (ref 2.3–3.5)
GLUCOSE BLD-MCNC: 135 MG/DL (ref 60–100)
HCT VFR BLD AUTO: 38.1 % (ref 35–45)
HGB BLD-MCNC: 12.8 G/DL (ref 12–15.5)
HOLD SPECIMEN: NORMAL
IMM GRANULOCYTES # BLD: 0.02 10*3/MM3 (ref 0–0.02)
IMM GRANULOCYTES NFR BLD: 0.3 % (ref 0–0.5)
INR PPP: 1.63 (ref 0.8–1.2)
LYMPHOCYTES # BLD AUTO: 2.44 10*3/MM3 (ref 0.6–4.2)
LYMPHOCYTES NFR BLD AUTO: 35.9 % (ref 10–50)
MCH RBC QN AUTO: 30 PG (ref 26.5–34)
MCHC RBC AUTO-ENTMCNC: 33.6 G/DL (ref 31.4–36)
MCV RBC AUTO: 89.4 FL (ref 80–98)
MONOCYTES # BLD AUTO: 0.61 10*3/MM3 (ref 0–0.9)
MONOCYTES NFR BLD AUTO: 9 % (ref 0–12)
NEUTROPHILS # BLD AUTO: 3.47 10*3/MM3 (ref 2–8.6)
NEUTROPHILS NFR BLD AUTO: 51 % (ref 37–80)
NT-PROBNP SERPL-MCNC: 180 PG/ML (ref 0–900)
PLATELET # BLD AUTO: 237 10*3/MM3 (ref 150–450)
PMV BLD AUTO: 8.9 FL (ref 8–12)
POTASSIUM BLD-SCNC: 3.9 MMOL/L (ref 3.5–5.1)
PROT SERPL-MCNC: 6.4 G/DL (ref 6.3–8.6)
PROTHROMBIN TIME: 18.8 SECONDS (ref 11.1–15.3)
RBC # BLD AUTO: 4.26 10*6/MM3 (ref 3.77–5.16)
SODIUM BLD-SCNC: 137 MMOL/L (ref 137–145)
TROPONIN I SERPL-MCNC: <0.012 NG/ML
TROPONIN I SERPL-MCNC: <0.012 NG/ML
WBC NRBC COR # BLD: 6.8 10*3/MM3 (ref 3.2–9.8)

## 2018-11-13 PROCEDURE — 99285 EMERGENCY DEPT VISIT HI MDM: CPT

## 2018-11-13 PROCEDURE — 82553 CREATINE MB FRACTION: CPT | Performed by: EMERGENCY MEDICINE

## 2018-11-13 PROCEDURE — 94799 UNLISTED PULMONARY SVC/PX: CPT

## 2018-11-13 PROCEDURE — 93010 ELECTROCARDIOGRAM REPORT: CPT | Performed by: INTERNAL MEDICINE

## 2018-11-13 PROCEDURE — G0378 HOSPITAL OBSERVATION PER HR: HCPCS

## 2018-11-13 PROCEDURE — 83880 ASSAY OF NATRIURETIC PEPTIDE: CPT | Performed by: EMERGENCY MEDICINE

## 2018-11-13 PROCEDURE — 93005 ELECTROCARDIOGRAM TRACING: CPT

## 2018-11-13 PROCEDURE — 94760 N-INVAS EAR/PLS OXIMETRY 1: CPT

## 2018-11-13 PROCEDURE — 84484 ASSAY OF TROPONIN QUANT: CPT | Performed by: EMERGENCY MEDICINE

## 2018-11-13 PROCEDURE — 80053 COMPREHEN METABOLIC PANEL: CPT | Performed by: EMERGENCY MEDICINE

## 2018-11-13 PROCEDURE — 84484 ASSAY OF TROPONIN QUANT: CPT | Performed by: NURSE PRACTITIONER

## 2018-11-13 PROCEDURE — 85025 COMPLETE CBC W/AUTO DIFF WBC: CPT | Performed by: EMERGENCY MEDICINE

## 2018-11-13 PROCEDURE — 85730 THROMBOPLASTIN TIME PARTIAL: CPT | Performed by: EMERGENCY MEDICINE

## 2018-11-13 PROCEDURE — 82962 GLUCOSE BLOOD TEST: CPT

## 2018-11-13 PROCEDURE — 85379 FIBRIN DEGRADATION QUANT: CPT | Performed by: EMERGENCY MEDICINE

## 2018-11-13 PROCEDURE — 85610 PROTHROMBIN TIME: CPT | Performed by: EMERGENCY MEDICINE

## 2018-11-13 PROCEDURE — 82550 ASSAY OF CK (CPK): CPT | Performed by: EMERGENCY MEDICINE

## 2018-11-13 PROCEDURE — 93005 ELECTROCARDIOGRAM TRACING: CPT | Performed by: EMERGENCY MEDICINE

## 2018-11-13 PROCEDURE — 71045 X-RAY EXAM CHEST 1 VIEW: CPT

## 2018-11-13 RX ORDER — NICOTINE POLACRILEX 4 MG
15 LOZENGE BUCCAL
Status: DISCONTINUED | OUTPATIENT
Start: 2018-11-13 | End: 2018-11-15 | Stop reason: HOSPADM

## 2018-11-13 RX ORDER — FENOFIBRATE 48 MG/1
48 TABLET, COATED ORAL DAILY
Status: DISCONTINUED | OUTPATIENT
Start: 2018-11-14 | End: 2018-11-15 | Stop reason: HOSPADM

## 2018-11-13 RX ORDER — AMIODARONE HYDROCHLORIDE 200 MG/1
100 TABLET ORAL 2 TIMES DAILY
Status: DISCONTINUED | OUTPATIENT
Start: 2018-11-13 | End: 2018-11-15 | Stop reason: HOSPADM

## 2018-11-13 RX ORDER — OXYCODONE AND ACETAMINOPHEN 10; 325 MG/1; MG/1
1 TABLET ORAL ONCE
Status: COMPLETED | OUTPATIENT
Start: 2018-11-13 | End: 2018-11-13

## 2018-11-13 RX ORDER — ACETAMINOPHEN 325 MG/1
650 TABLET ORAL EVERY 4 HOURS PRN
Status: DISCONTINUED | OUTPATIENT
Start: 2018-11-13 | End: 2018-11-15 | Stop reason: HOSPADM

## 2018-11-13 RX ORDER — NITROGLYCERIN 0.4 MG/1
0.4 TABLET SUBLINGUAL
Status: DISCONTINUED | OUTPATIENT
Start: 2018-11-13 | End: 2018-11-15 | Stop reason: HOSPADM

## 2018-11-13 RX ORDER — ASPIRIN 81 MG/1
324 TABLET, CHEWABLE ORAL ONCE
Status: COMPLETED | OUTPATIENT
Start: 2018-11-13 | End: 2018-11-13

## 2018-11-13 RX ORDER — LISINOPRIL 20 MG/1
20 TABLET ORAL DAILY
Status: DISCONTINUED | OUTPATIENT
Start: 2018-11-14 | End: 2018-11-15 | Stop reason: HOSPADM

## 2018-11-13 RX ORDER — SODIUM CHLORIDE 0.9 % (FLUSH) 0.9 %
3-10 SYRINGE (ML) INJECTION AS NEEDED
Status: DISCONTINUED | OUTPATIENT
Start: 2018-11-13 | End: 2018-11-15 | Stop reason: HOSPADM

## 2018-11-13 RX ORDER — ONDANSETRON 2 MG/ML
4 INJECTION INTRAMUSCULAR; INTRAVENOUS EVERY 6 HOURS PRN
Status: DISCONTINUED | OUTPATIENT
Start: 2018-11-13 | End: 2018-11-15 | Stop reason: HOSPADM

## 2018-11-13 RX ORDER — OXYCODONE AND ACETAMINOPHEN 10; 325 MG/1; MG/1
1 TABLET ORAL EVERY 6 HOURS PRN
Status: DISCONTINUED | OUTPATIENT
Start: 2018-11-13 | End: 2018-11-15 | Stop reason: HOSPADM

## 2018-11-13 RX ORDER — DEXTROSE MONOHYDRATE 25 G/50ML
25 INJECTION, SOLUTION INTRAVENOUS
Status: DISCONTINUED | OUTPATIENT
Start: 2018-11-13 | End: 2018-11-15 | Stop reason: HOSPADM

## 2018-11-13 RX ORDER — SODIUM CHLORIDE 0.9 % (FLUSH) 0.9 %
3 SYRINGE (ML) INJECTION EVERY 12 HOURS SCHEDULED
Status: DISCONTINUED | OUTPATIENT
Start: 2018-11-13 | End: 2018-11-15 | Stop reason: HOSPADM

## 2018-11-13 RX ORDER — SODIUM CHLORIDE 0.9 % (FLUSH) 0.9 %
10 SYRINGE (ML) INJECTION AS NEEDED
Status: DISCONTINUED | OUTPATIENT
Start: 2018-11-13 | End: 2018-11-15 | Stop reason: HOSPADM

## 2018-11-13 RX ORDER — SODIUM CHLORIDE 9 MG/ML
75 INJECTION, SOLUTION INTRAVENOUS CONTINUOUS
Status: DISCONTINUED | OUTPATIENT
Start: 2018-11-13 | End: 2018-11-13

## 2018-11-13 RX ORDER — FUROSEMIDE 20 MG/1
20 TABLET ORAL DAILY
Status: DISCONTINUED | OUTPATIENT
Start: 2018-11-14 | End: 2018-11-15 | Stop reason: HOSPADM

## 2018-11-13 RX ORDER — ATORVASTATIN CALCIUM 40 MG/1
40 TABLET, FILM COATED ORAL NIGHTLY
Status: DISCONTINUED | OUTPATIENT
Start: 2018-11-13 | End: 2018-11-15 | Stop reason: HOSPADM

## 2018-11-13 RX ADMIN — SERTRALINE HYDROCHLORIDE 100 MG: 50 TABLET ORAL at 21:12

## 2018-11-13 RX ADMIN — OXYCODONE HYDROCHLORIDE AND ACETAMINOPHEN 1 TABLET: 10; 325 TABLET ORAL at 16:41

## 2018-11-13 RX ADMIN — ATORVASTATIN CALCIUM 40 MG: 40 TABLET, FILM COATED ORAL at 21:13

## 2018-11-13 RX ADMIN — ASPIRIN 81 MG CHEWABLE TABLET 324 MG: 81 TABLET CHEWABLE at 14:44

## 2018-11-13 RX ADMIN — SERTRALINE HYDROCHLORIDE 50 MG: 50 TABLET ORAL at 21:14

## 2018-11-13 RX ADMIN — RIVAROXABAN 20 MG: 10 TABLET, FILM COATED ORAL at 21:13

## 2018-11-13 RX ADMIN — Medication 100 MG: at 21:13

## 2018-11-13 RX ADMIN — Medication 3 ML: at 21:15

## 2018-11-13 RX ADMIN — METOPROLOL TARTRATE 25 MG: 25 TABLET ORAL at 21:13

## 2018-11-13 RX ADMIN — SODIUM CHLORIDE 75 ML/HR: 9 INJECTION, SOLUTION INTRAVENOUS at 14:39

## 2018-11-13 RX ADMIN — NITROGLYCERIN 0.4 MG: 0.4 TABLET SUBLINGUAL at 14:39

## 2018-11-13 NOTE — ED PROVIDER NOTES
"Subjective   72yo female pmh significant htn/hyperlipidemia/dm2/atrial fibrillation/obesity presents ED c/o acute onset substernal chest pain 1130hrs associated with interscapular pain \"ache/heavy\", with associated symptoms nausea, soa.  Pt denies exac or relieve factors; pt rates pain 6/10 at time of exam.        History provided by:  Patient  Chest Pain   Pain location:  Substernal area  Pain quality: aching    Pain radiates to:  Upper back  Onset quality:  Sudden  Duration:  1 day  Timing:  Constant  Chronicity:  New  Associated symptoms: shortness of breath    Associated symptoms: no cough and no palpitations        Review of Systems   Constitutional: Negative.    HENT: Negative.    Respiratory: Positive for shortness of breath. Negative for cough.    Cardiovascular: Positive for chest pain. Negative for palpitations and leg swelling.   Gastrointestinal: Negative.    Genitourinary: Negative.    Musculoskeletal: Negative.    Neurological: Negative for syncope.   All other systems reviewed and are negative.      Past Medical History:   Diagnosis Date   • Acute sinusitis    • Anorectal fistula     Anorectal fistula - status post repair      • Atrial fibrillation (CMS/HCC)    • Backache    • Carpal tunnel syndrome    • Chest pain, non-cardiac    • Degenerative joint disease involving multiple joints    • Depressive disorder    • Diarrhea    • Dyspnea    • Electrocardiogram abnormal    • Essential hypertension    • Female stress incontinence    • Generalized anxiety disorder    • GERD (gastroesophageal reflux disease)    • H/O tubal ligation    • Hemorrhoids    • Hypercholesterolemia    • Hyperlipidemia    • Hypertensive disorder    • Low back pain     C/O - low back pain      • Normal gynecologic examination    • Obesity    • Otitis media, left    • Palpitations     a   • Primary fibromyalgia syndrome    • Sleep apnea    • Tachycardia    • Type 2 diabetes mellitus (CMS/HCC)        Allergies   Allergen Reactions   • " Adhesive Tape    • Celebrex [Celecoxib]    • Darvon [Propoxyphene]    • Demerol [Meperidine]    • Dilantin [Phenytoin]    • Dilaudid [Hydromorphone Hcl]    • Iodinated Diagnostic Agents    • Latex    • Morphine And Related    • Nsaids    • Penicillins Swelling     Patient had a reaction to penicillin in 2010.  Reaction included hives and swelling.  Patient states she has tolerated cephalexin in the past.   • Phenergan [Promethazine]    • Vioxx [Rofecoxib]    • Vistaril [Hydroxyzine Hcl]        Past Surgical History:   Procedure Laterality Date   • BARIATRIC SURGERY     • BREAST BIOPSY     • CARDIAC ABLATION     • ENDOSCOPY AND COLONOSCOPY  09/27/2000     A single small sessile polyp was seen in the rectum which measured 1 mm.211.3 External hemorrhoids were present. 455.3    • INJECTION OF MEDICATION  01/07/2016    Celestone (betamethasone) (2)        • JOINT REPLACEMENT      right knee 2008   • KNEE ARTHROSCOPY  07/09/2008     Right total knee atthroplasty. Osteoarthritis of the right knee.    • STOMACH SURGERY  1976    Revise stomach-bowel fusion (1)    history of jejunal surgery    • TONSILLECTOMY     • TUBAL ABDOMINAL LIGATION     • UPPER GASTROINTESTINAL ENDOSCOPY  03/31/2017       Family History   Problem Relation Age of Onset   • Hyperlipidemia Mother    • Hypertension Mother    • Lung cancer Mother    • Hyperlipidemia Father    • Hypertension Father    • Lung cancer Sister    • Gallbladder disease Maternal Grandmother    • Heart disease Paternal Grandmother    • Diabetes Paternal Grandfather        Social History     Socioeconomic History   • Marital status:      Spouse name: Not on file   • Number of children: Not on file   • Years of education: Not on file   • Highest education level: Not on file   Tobacco Use   • Smoking status: Never Smoker   • Smokeless tobacco: Never Used   Substance and Sexual Activity   • Alcohol use: No   • Drug use: No   • Sexual activity: Not Currently     Partners: Male            Objective   Physical Exam   Constitutional: She is oriented to person, place, and time. She appears well-developed and well-nourished.   HENT:   Head: Normocephalic and atraumatic.   Eyes: Pupils are equal, round, and reactive to light.   Neck: Normal range of motion. Neck supple.   Cardiovascular: Normal rate, regular rhythm, normal heart sounds and intact distal pulses. Exam reveals no gallop.   No murmur heard.  Pulmonary/Chest: Effort normal and breath sounds normal. She has no wheezes. She has no rhonchi. She has no rales.   Abdominal: Soft. Bowel sounds are normal. There is no tenderness.   Musculoskeletal: Normal range of motion.        Right lower leg: She exhibits no tenderness and no edema.   Neurological: She is alert and oriented to person, place, and time.   Skin: Skin is warm and dry.   Nursing note and vitals reviewed.      Procedures           ED Course  ED Course as of Nov 13 1641   Tue Nov 13, 2018   1431 Pt reports no allergy to asa.  [SD]   1637 Ekg: SB/rate 57/1st degree AVB/RBBB/ q v1-v3. No change 09.11.2018.  [SD]      ED Course User Index  [SD] Krzysztof Yarbrough MD      Labs Reviewed   COMPREHENSIVE METABOLIC PANEL - Abnormal; Notable for the following components:       Result Value    Glucose 135 (*)     All other components within normal limits    Narrative:     The MDRD GFR formula is only valid for adults with stable renal function between ages 18 and 70.   PROTIME-INR - Abnormal; Notable for the following components:    Protime 18.8 (*)     INR 1.63 (*)     All other components within normal limits    Narrative:     Therapeutic range for most indications is 2.0-3.0 INR,  or 2.5-3.5 for mechanical heart valves.   APTT - Normal    Narrative:     The recommended Heparin therapeutic range is 68-97 seconds.   TROPONIN (IN-HOUSE) - Normal   CK - Normal   CK MB - Normal   BNP (IN-HOUSE) - Normal   D-DIMER, QUANTITATIVE - Normal    Narrative:     Dimer values <500 ng/ml FEU are FDA  approved as aid in diagnosis of deep venous thrombosis and pulmonary embolism.  This test should not be used in an exclusion strategy with pretest probability alone.    A recent guideline regarding diagnosis for pulmonary thromboembolism recommends an adjusted exclusion criterion of age x 10 ng/ml FEU for patients >50 years of age ([employer] Intern Med 2015; 163: 701-711).   CBC WITH AUTO DIFFERENTIAL - Normal   TROPONIN (IN-HOUSE)   CBC AND DIFFERENTIAL    Narrative:     The following orders were created for panel order CBC & Differential.  Procedure                               Abnormality         Status                     ---------                               -----------         ------                     CBC Auto Differential[[id number]]        Normal              Final result                 Please view results for these tests on the individual orders.   EXTRA TUBES    Narrative:     The following orders were created for panel order Extra Tubes.  Procedure                               Abnormality         Status                     ---------                               -----------         ------                     Gold Top - SST[[id number]]                                   Final result                 Please view results for these tests on the individual orders.   GOLD TOP - SST     Xr Chest 1 View    Result Date: 11/13/2018  Narrative: PROCEDURE: Single chest view portable erect REASON FOR EXAM:soa FINDINGS: Comparison exam dated September 10, 2018. Cardiac and pulmonary vasculature are normal. Left upper lung field stable small calcified lung parenchymal granuloma consistent with old granulomatous disease. Lungs are otherwise clear. Pleural spaces are normal. No acute osseous abnormality.     Impression: 1.  Evidence of old granulomatous disease. 2.  Otherwise unremarkable chest. Electronically signed by:  Abdias Ayala MD  11/13/2018 2:46 PM CST Workstation: [id number]              HEART Score (for prediction of  6-week risk of major adverse cardiac event) reviewed and/or performed as part of the patient evaluation and treatment planning process.  The result associated with this review/performance is: 6       MDM      Final diagnoses:   Chest pain, unspecified type            Krzysztof Yarbrough MD  11/13/18 9444

## 2018-11-13 NOTE — ED TRIAGE NOTES
Pt states she suddenly felt a pain between her shoulder blades and shortness of air while standing about 30 minutes ago. Pt referred to ED by clinic in [place].

## 2018-11-14 LAB
ANION GAP SERPL CALCULATED.3IONS-SCNC: 5 MMOL/L (ref 5–15)
BASOPHILS # BLD AUTO: 0.01 10*3/MM3 (ref 0–0.2)
BASOPHILS NFR BLD AUTO: 0.1 % (ref 0–2)
BUN BLD-MCNC: 14 MG/DL (ref 7–21)
BUN/CREAT SERPL: 17.3 (ref 7–25)
CALCIUM SPEC-SCNC: 8.8 MG/DL (ref 8.4–10.2)
CHLORIDE SERPL-SCNC: 104 MMOL/L (ref 95–110)
CO2 SERPL-SCNC: 28 MMOL/L (ref 22–31)
CREAT BLD-MCNC: 0.81 MG/DL (ref 0.5–1)
DEPRECATED RDW RBC AUTO: 41.7 FL (ref 36.4–46.3)
EOSINOPHIL # BLD AUTO: 0.25 10*3/MM3 (ref 0–0.7)
EOSINOPHIL NFR BLD AUTO: 2.7 % (ref 0–7)
ERYTHROCYTE [DISTWIDTH] IN BLOOD BY AUTOMATED COUNT: 13 % (ref 11.5–14.5)
GFR SERPL CREATININE-BSD FRML MDRD: 70 ML/MIN/1.73 (ref 39–90)
GLUCOSE BLD-MCNC: 128 MG/DL (ref 60–100)
GLUCOSE BLDC GLUCOMTR-MCNC: 125 MG/DL (ref 70–130)
GLUCOSE BLDC GLUCOMTR-MCNC: 140 MG/DL (ref 70–130)
GLUCOSE BLDC GLUCOMTR-MCNC: 141 MG/DL (ref 70–130)
GLUCOSE BLDC GLUCOMTR-MCNC: 168 MG/DL (ref 70–130)
HCT VFR BLD AUTO: 37.8 % (ref 35–45)
HGB BLD-MCNC: 12.8 G/DL (ref 12–15.5)
IMM GRANULOCYTES # BLD: 0.04 10*3/MM3 (ref 0–0.02)
IMM GRANULOCYTES NFR BLD: 0.4 % (ref 0–0.5)
LYMPHOCYTES # BLD AUTO: 1.66 10*3/MM3 (ref 0.6–4.2)
LYMPHOCYTES NFR BLD AUTO: 17.9 % (ref 10–50)
MCH RBC QN AUTO: 30 PG (ref 26.5–34)
MCHC RBC AUTO-ENTMCNC: 33.9 G/DL (ref 31.4–36)
MCV RBC AUTO: 88.5 FL (ref 80–98)
MONOCYTES # BLD AUTO: 0.59 10*3/MM3 (ref 0–0.9)
MONOCYTES NFR BLD AUTO: 6.4 % (ref 0–12)
NEUTROPHILS # BLD AUTO: 6.71 10*3/MM3 (ref 2–8.6)
NEUTROPHILS NFR BLD AUTO: 72.5 % (ref 37–80)
PLATELET # BLD AUTO: 209 10*3/MM3 (ref 150–450)
PMV BLD AUTO: 8.5 FL (ref 8–12)
POTASSIUM BLD-SCNC: 3.8 MMOL/L (ref 3.5–5.1)
RBC # BLD AUTO: 4.27 10*6/MM3 (ref 3.77–5.16)
SODIUM BLD-SCNC: 137 MMOL/L (ref 137–145)
TROPONIN I SERPL-MCNC: 0.01 NG/ML
TROPONIN I SERPL-MCNC: 0.01 NG/ML
WBC NRBC COR # BLD: 9.26 10*3/MM3 (ref 3.2–9.8)

## 2018-11-14 PROCEDURE — 80048 BASIC METABOLIC PNL TOTAL CA: CPT | Performed by: NURSE PRACTITIONER

## 2018-11-14 PROCEDURE — 63710000001 INSULIN ASPART PER 5 UNITS: Performed by: NURSE PRACTITIONER

## 2018-11-14 PROCEDURE — 82962 GLUCOSE BLOOD TEST: CPT

## 2018-11-14 PROCEDURE — 85025 COMPLETE CBC W/AUTO DIFF WBC: CPT | Performed by: NURSE PRACTITIONER

## 2018-11-14 PROCEDURE — G0378 HOSPITAL OBSERVATION PER HR: HCPCS

## 2018-11-14 PROCEDURE — 84484 ASSAY OF TROPONIN QUANT: CPT | Performed by: EMERGENCY MEDICINE

## 2018-11-14 PROCEDURE — 36415 COLL VENOUS BLD VENIPUNCTURE: CPT | Performed by: EMERGENCY MEDICINE

## 2018-11-14 PROCEDURE — 84484 ASSAY OF TROPONIN QUANT: CPT | Performed by: NURSE PRACTITIONER

## 2018-11-14 RX ADMIN — INSULIN ASPART 2 UNITS: 100 INJECTION, SOLUTION INTRAVENOUS; SUBCUTANEOUS at 17:18

## 2018-11-14 RX ADMIN — LISINOPRIL 20 MG: 20 TABLET ORAL at 09:01

## 2018-11-14 RX ADMIN — SERTRALINE HYDROCHLORIDE 100 MG: 50 TABLET ORAL at 09:01

## 2018-11-14 RX ADMIN — METOPROLOL TARTRATE 25 MG: 25 TABLET ORAL at 09:01

## 2018-11-14 RX ADMIN — FUROSEMIDE 20 MG: 20 TABLET ORAL at 09:01

## 2018-11-14 RX ADMIN — Medication 3 ML: at 09:03

## 2018-11-14 RX ADMIN — ACETAMINOPHEN 650 MG: 325 TABLET ORAL at 13:48

## 2018-11-14 RX ADMIN — FENOFIBRATE 48 MG: 48 TABLET ORAL at 09:01

## 2018-11-14 RX ADMIN — Medication 100 MG: at 20:53

## 2018-11-14 RX ADMIN — METOPROLOL TARTRATE 25 MG: 25 TABLET ORAL at 20:56

## 2018-11-14 RX ADMIN — RIVAROXABAN 20 MG: 10 TABLET, FILM COATED ORAL at 17:18

## 2018-11-14 RX ADMIN — Medication 3 ML: at 21:08

## 2018-11-14 RX ADMIN — ATORVASTATIN CALCIUM 40 MG: 40 TABLET, FILM COATED ORAL at 20:53

## 2018-11-14 RX ADMIN — SERTRALINE HYDROCHLORIDE 50 MG: 50 TABLET ORAL at 20:53

## 2018-11-14 RX ADMIN — INSULIN ASPART 2 UNITS: 100 INJECTION, SOLUTION INTRAVENOUS; SUBCUTANEOUS at 20:53

## 2018-11-14 RX ADMIN — Medication 100 MG: at 09:01

## 2018-11-14 NOTE — PROGRESS NOTES
[facility] Medicine Services  INPATIENT PROGRESS NOTE    Length of Stay: 0  Date of Admission: 11/13/2018  Primary Care Physician: Miranda Llanes APRN    Subjective   Chief Complaint:  Chest pain  HPI:  Patient states that she felt like she went into atrial fibrillation earlier this morning.  Telemetry showed NSR.  She states that she is currently feeling fine.    Review of Systems   Constitutional: Negative for appetite change, chills, fatigue, fever and unexpected weight change.   Respiratory: Negative for cough, choking, chest tightness, shortness of breath and wheezing.    Cardiovascular: Positive for chest pain. Negative for palpitations and leg swelling.   Gastrointestinal: Negative for abdominal pain, blood in stool, constipation, diarrhea, nausea and vomiting.   Genitourinary: Negative for dysuria, flank pain and hematuria.   Musculoskeletal: Positive for back pain.   Neurological: Negative for dizziness, seizures, syncope, speech difficulty, weakness, light-headedness, numbness and headaches.   Hematological: Does not bruise/bleed easily.        All pertinent negatives and positives are as above. All other systems have been reviewed and are negative unless otherwise stated.     Objective    Temp:  [97.2 °F (36.2 °C)-98.7 °F (37.1 °C)] 97.2 °F (36.2 °C)  Heart Rate:  [56-64] 63  Resp:  [18-20] 18  BP: (106-146)/(57-67) 106/59    Physical Exam   Constitutional: She appears well-developed and well-nourished.   HENT:   Head: Normocephalic and atraumatic.   Eyes: EOM are normal. Pupils are equal, round, and reactive to light.   Neck: Normal range of motion. Neck supple.   Cardiovascular: Normal rate, regular rhythm and normal heart sounds. Exam reveals no gallop and no friction rub.   No murmur heard.  Pulmonary/Chest: Effort normal and breath sounds normal. No respiratory distress. She has no wheezes. She has no rales. She exhibits no tenderness.   Abdominal: Soft.  Bowel sounds are normal. She exhibits no distension. There is no tenderness. There is no guarding.   Musculoskeletal: She exhibits no edema.   Skin: Skin is warm and dry.   Psychiatric: She has a normal mood and affect. Her behavior is normal. Thought content normal.   Vitals reviewed.          Results Review:  I have reviewed the labs, radiology results, and diagnostic studies.    Laboratory Data:   Results from last 7 days   Lab Units  11/14/18   0601  11/13/18   1440   SODIUM mmol/L  137  137   POTASSIUM mmol/L  3.8  3.9   CHLORIDE mmol/L  104  101   CO2 mmol/L  28.0  28.0   BUN mg/dL  14  15   CREATININE mg/dL  0.81  0.87   GLUCOSE mg/dL  128*  135*   CALCIUM mg/dL  8.8  9.1   BILIRUBIN mg/dL   --   0.5   ALK PHOS U/L   --   53   ALT (SGPT) U/L   --   23   AST (SGOT) U/L   --   22   ANION GAP mmol/L  5.0  8.0     Estimated Creatinine Clearance: 80.5 mL/min (by C-G formula based on SCr of 0.81 mg/dL).          Results from last 7 days   Lab Units  11/14/18   0601  11/13/18   1440   WBC 10*3/mm3  9.26  6.80   HEMOGLOBIN g/dL  12.8  12.8   HEMATOCRIT %  37.8  38.1   PLATELETS 10*3/mm3  209  237     Results from last 7 days   Lab Units  11/13/18   1440   INR   1.63*       Culture Data:   No results found for: BLOODCX  No results found for: URINECX  No results found for: RESPCX  No results found for: WOUNDCX  No results found for: STOOLCX  No components found for: BODYFLD    Radiology Data:   Imaging Results (last 24 hours)     ** No results found for the last 24 hours. **          I have reviewed the patient's current medications.     Assessment/Plan     Active Hospital Problems    Diagnosis   • Chest pain       Plan:    1.  Chest pain:  Workup negative so far.  Troponin negative.  D-dimer negative.  EKG shows NSR.  Patient states that she has had a negative stress test recently at [facility] where her primary cardiologist is.  I will attempt to speak with him and obtain records.  2.  Atrial fibrillation:  Has  maintained normal sinus rhythm here.  She states that she has tolerated amiodarone well.    3.  DM  4.  HTN:  Continue home meds  5.  Hyperlipidemia:  Fenofibrate and atorvastatin.              Discharge Planning: I expect patient to be discharged to home in 1-2 days.        This document has been electronically signed by Darius Mae MD on November 14, 2018 4:53 PM

## 2018-11-14 NOTE — H&P
[facility] Medicine Admission      Date of Admission: 11/13/2018      Primary Care Physician: Miranda Llanes APRN      Chief Complaint: Chest pain    HPI: This is a 73 year old  female with PMH of atrial fibrillation, depression, HTN, GERD, HLD, and DM II that presents to [facility] with complaints of having a sharp stabbing pain in between the shoulder blades earlier today.  Once that pain resolved, she developed substernal chest pain that she describes as heaviness.  She also has associated shortness of air and nausea.  The patient states she follows with Dr. Juan Nielsen at [place] for cardiology and had a recent follow up a few weeks ago and everything was okay at that time.     Concurrent Medical History:  has a past medical history of Acute sinusitis, Anorectal fistula, Atrial fibrillation (CMS/HCC), Backache, Carpal tunnel syndrome, Chest pain, non-cardiac, Degenerative joint disease involving multiple joints, Depressive disorder, Diarrhea, Dyspnea, Electrocardiogram abnormal, Essential hypertension, Female stress incontinence, Generalized anxiety disorder, GERD (gastroesophageal reflux disease), H/O tubal ligation, Hemorrhoids, Hypercholesterolemia, Hyperlipidemia, Hypertensive disorder, Low back pain, Normal gynecologic examination, Obesity, Otitis media, left, Palpitations, Primary fibromyalgia syndrome, Sleep apnea, Tachycardia, and Type 2 diabetes mellitus (CMS/HCC).    Past Surgical History:  has a past surgical history that includes Knee Arthroscopy (07/09/2008); Injection of Medication (01/07/2016); endoscopy and colonoscopy (09/27/2000); Stomach surgery (1976); Tubal ligation; Cardiac Ablation; Tonsillectomy; Joint replacement; Upper gastrointestinal endoscopy (03/31/2017); Bariatric Surgery; Breast biopsy; ESOPHAGOGASTRODUODENOSCOPY dilation (N/A, 3/31/2017); and COLONOSCOPY (N/A, 3/31/2017).    Family History: family history includes Diabetes  in her paternal grandfather; Gallbladder disease in her maternal grandmother; Heart disease in her paternal grandmother; Hyperlipidemia in her father and mother; Hypertension in her father and mother; Lung cancer in her mother and sister.    Social History:  reports that  has never smoked. she has never used smokeless tobacco. She reports that she does not drink alcohol or use drugs.    Allergies:   Allergies   Allergen Reactions   • Adhesive Tape    • Celebrex [Celecoxib]    • Darvon [Propoxyphene]    • Demerol [Meperidine]    • Dilantin [Phenytoin]    • Dilaudid [Hydromorphone Hcl]    • Iodinated Diagnostic Agents    • Latex    • Morphine And Related    • Nsaids    • Penicillins Swelling     Patient had a reaction to penicillin in 2010.  Reaction included hives and swelling.  Patient states she has tolerated cephalexin in the past.   • Phenergan [Promethazine]    • Vioxx [Rofecoxib]    • Vistaril [Hydroxyzine Hcl]        Medications:   Prior to Admission medications    Medication Sig Start Date End Date Taking? Authorizing Provider   amiodarone (PACERONE) 200 MG tablet Take 1 tablet by mouth Daily.  Patient taking differently: Take 100 mg by mouth 2 (Two) Times a Day. 10/15/18  Yes Miranda Llanes APRN   atorvastatin (LIPITOR) 40 MG tablet Take 1 tablet by mouth Every Night. 9/4/18  Yes Miranda Llanes APRN   fenofibrate (LOFIBRA) 54 MG tablet Take 1 tablet by mouth Daily. 9/4/18  Yes Miranda Llanes APRN   furosemide (LASIX) 20 MG tablet Take 1 tablet by mouth Daily. 9/4/18  Yes Miranda Llanes APRN   lisinopril (PRINIVIL,ZESTRIL) 20 MG tablet Take 1 tablet by mouth Daily. 9/4/18  Yes Miranda Llanes APRN   metFORMIN (GLUCOPHAGE) 500 MG tablet Take 1 tablet by mouth Daily With Breakfast. 9/4/18  Yes Miranda Llanes APRN   metoprolol tartrate (LOPRESSOR) 50 MG tablet 1/2 tab bid  Patient taking differently: Take 25 mg by mouth Every 12 (Twelve) Hours. 1/2 tab bid 9/20/18  Yes Miranda Llanes  KAVITHA   oxyCODONE-acetaminophen (PERCOCET)  MG per tablet Take 1 tablet by mouth Every 6 (Six) Hours As Needed for Moderate Pain .   Yes Provider, MD Salomón   rivaroxaban (XARELTO) 20 MG tablet Take 1 tablet by mouth Daily.  Patient taking differently: Take 20 mg by mouth Daily With Dinner. 9/4/18  Yes Miranda Llanes APRN   sertraline (ZOLOFT) 100 MG tablet 1 tab in the am and 1/2 in the pm  Patient taking differently: Take 100 mg by mouth Daily. 1 tab in the am and 1/2 in the pm 9/4/18  Yes Miranda Llanes APRN   sertraline (ZOLOFT) 50 MG tablet Take 50 mg by mouth Every Night.   Yes Provider, MD Salomón   propafenone (RYTHMOL) 150 MG tablet Take 150 mg by mouth Every 8 (Eight) Hours.  11/13/18 Yes ProviderSalomón MD   diclofenac (VOLTAREN) 75 MG EC tablet Take 1 tablet by mouth 2 (Two) Times a Day. 9/4/18 11/13/18  Miranda Llanes APRN   terbinafine (LAMISIL AT) 1 % cream Apply  topically to the appropriate area as directed 2 (Two) Times a Day. 9/4/18 11/13/18  Miranda Llanes APRN   tiZANidine (ZANAFLEX) 4 MG tablet Take 1 tablet by mouth Every 8 (Eight) Hours As Needed for Muscle Spasms. 9/4/18 11/13/18  Miranda Llanes APRN       Review of Systems:  Review of Systems   Constitutional: Negative for activity change and fatigue.   HENT: Negative for ear pain and sore throat.    Eyes: Negative for pain and discharge.   Respiratory: Positive for shortness of breath. Negative for cough.    Cardiovascular: Positive for chest pain. Negative for palpitations.   Gastrointestinal: Positive for nausea. Negative for abdominal pain.   Endocrine: Negative for cold intolerance and heat intolerance.   Genitourinary: Negative for difficulty urinating and dysuria.   Musculoskeletal: Negative for arthralgias and gait problem.   Skin: Negative for color change and rash.   Neurological: Negative for dizziness and weakness.   Psychiatric/Behavioral: Negative for agitation and confusion.       Otherwise complete ROS is negative except as mentioned above.    Physical Exam:   Temp:  [98.8 °F (37.1 °C)] 98.8 °F (37.1 °C)  Heart Rate:  [54-60] 56  Resp:  [20] 20  BP: ()/(48-77) 117/57  Physical Exam   Constitutional: She is oriented to person, place, and time. She appears well-developed and well-nourished.   HENT:   Head: Normocephalic and atraumatic.   Eyes: EOM are normal. Pupils are equal, round, and reactive to light.   Neck: Normal range of motion. Neck supple.   Cardiovascular: Normal rate and regular rhythm.   Pulmonary/Chest: Effort normal and breath sounds normal.   Abdominal: Soft. Bowel sounds are normal.   Musculoskeletal: Normal range of motion.   Neurological: She is alert and oriented to person, place, and time.   Skin: Skin is warm and dry.   Psychiatric: She has a normal mood and affect. Her behavior is normal.     Results Reviewed:  I have personally reviewed current lab, radiology, and data and agree with results.  Lab Results (last 24 hours)     Procedure Component Value Units Date/Time    Extra Tubes [[id number]] Collected:  11/13/18 1440    Specimen:  Blood, Venous Line Updated:  11/13/18 1545    Narrative:       The following orders were created for panel order Extra Tubes.  Procedure                               Abnormality         Status                     ---------                               -----------         ------                     Gold Top - SST[[id number]]                                   Final result                 Please view results for these tests on the individual orders.    Gold Top - SST [[id number]] Collected:  11/13/18 1440    Specimen:  Blood Updated:  11/13/18 1545     Extra Tube Hold for add-ons.     Comment: Auto resulted.       Troponin [[id number]]  (Normal) Collected:  11/13/18 1440    Specimen:  Blood Updated:  11/13/18 1540     Troponin I <0.012 ng/mL     CK-MB [[id number]]  (Normal) Collected:  11/13/18 1440    Specimen:  Blood Updated:  11/13/18  1540     CKMB 1.15 ng/mL     BNP [[id number]]  (Normal) Collected:  11/13/18 1440    Specimen:  Blood Updated:  11/13/18 1540     proBNP 180.0 pg/mL     Comprehensive Metabolic Panel [[id number]]  (Abnormal) Collected:  11/13/18 1440    Specimen:  Blood Updated:  11/13/18 1527     Glucose 135 mg/dL      BUN 15 mg/dL      Creatinine 0.87 mg/dL      Sodium 137 mmol/L      Potassium 3.9 mmol/L      Chloride 101 mmol/L      CO2 28.0 mmol/L      Calcium 9.1 mg/dL      Total Protein 6.4 g/dL      Albumin 3.90 g/dL      ALT (SGPT) 23 U/L      AST (SGOT) 22 U/L      Alkaline Phosphatase 53 U/L      Total Bilirubin 0.5 mg/dL      eGFR Non African Amer 64 mL/min/1.73      Globulin 2.5 gm/dL      A/G Ratio 1.6 g/dL      BUN/Creatinine Ratio 17.2     Anion Gap 8.0 mmol/L     Narrative:       The MDRD GFR formula is only valid for adults with stable renal function between ages 18 and 70.    CK [[id number]]  (Normal) Collected:  11/13/18 1440    Specimen:  Blood Updated:  11/13/18 1527     Creatine Kinase 39 U/L     aPTT [[id number]]  (Normal) Collected:  11/13/18 1440    Specimen:  Blood Updated:  11/13/18 1516     PTT 39.6 seconds     Narrative:       The recommended Heparin therapeutic range is 68-97 seconds.    D-dimer, Quantitative [[id number]]  (Normal) Collected:  11/13/18 1440    Specimen:  Blood Updated:  11/13/18 1516     D-Dimer, Quantitative 354 ng/mL (FEU)     Narrative:       Dimer values <500 ng/ml FEU are FDA approved as aid in diagnosis of deep venous thrombosis and pulmonary embolism.  This test should not be used in an exclusion strategy with pretest probability alone.    A recent guideline regarding diagnosis for pulmonary thromboembolism recommends an adjusted exclusion criterion of age x 10 ng/ml FEU for patients >50 years of age ([employer]ellen Intern Med 2015; 163: 701-711).    Protime-INR [[id number]]  (Abnormal) Collected:  11/13/18 1440    Specimen:  Blood Updated:  11/13/18 1513     Protime 18.8 Seconds      INR 1.63     Narrative:       Therapeutic range for most indications is 2.0-3.0 INR,  or 2.5-3.5 for mechanical heart valves.    CBC & Differential [[id number]] Collected:  11/13/18 1440    Specimen:  Blood Updated:  11/13/18 1457    Narrative:       The following orders were created for panel order CBC & Differential.  Procedure                               Abnormality         Status                     ---------                               -----------         ------                     CBC Auto Differential[[id number]]        Normal              Final result                 Please view results for these tests on the individual orders.    CBC Auto Differential [[id number]]  (Normal) Collected:  11/13/18 1440    Specimen:  Blood Updated:  11/13/18 1457     WBC 6.80 10*3/mm3      RBC 4.26 10*6/mm3      Hemoglobin 12.8 g/dL      Hematocrit 38.1 %      MCV 89.4 fL      MCH 30.0 pg      MCHC 33.6 g/dL      RDW 13.1 %      RDW-SD 42.4 fl      MPV 8.9 fL      Platelets 237 10*3/mm3      Neutrophil % 51.0 %      Lymphocyte % 35.9 %      Monocyte % 9.0 %      Eosinophil % 3.7 %      Basophil % 0.1 %      Immature Grans % 0.3 %      Neutrophils, Absolute 3.47 10*3/mm3      Lymphocytes, Absolute 2.44 10*3/mm3      Monocytes, Absolute 0.61 10*3/mm3      Eosinophils, Absolute 0.25 10*3/mm3      Basophils, Absolute 0.01 10*3/mm3      Immature Grans, Absolute 0.02 10*3/mm3         Imaging Results (last 24 hours)     Procedure Component Value Units Date/Time    XR Chest 1 View [[id number]] Collected:  11/13/18 1423     Updated:  11/13/18 1447    Narrative:         PROCEDURE: Single chest view portable erect    REASON FOR EXAM:soa    FINDINGS: Comparison exam dated September 10, 2018. Cardiac and  pulmonary vasculature are normal. Left upper lung field stable  small calcified lung parenchymal granuloma consistent with old  granulomatous disease. Lungs are otherwise clear. Pleural spaces  are normal. No acute osseous abnormality.       Impression:       1.  Evidence of old granulomatous disease.  2.  Otherwise unremarkable chest.    Electronically signed by:  Abdias Ayala MD  11/13/2018 2:46 PM CST  Workstation: [id number]        Active Hospital Problems    Diagnosis Date Noted   • Chest pain [R07.9] 11/13/2018       Plan:  1.  Chest pain, rule out ACS:  Continuous telemetry, serial cardiac enzymes and EKG.    2.  Diabetes mellitus, type II:  SSI.  Hold Metformin for now.     3.  Paroxsymal Atrial fibrillation:  Continue Amiodarone, metoprolol and Xarelto.   4.  Hypertension:  Continue home medications Lisinopril and Metoprolol.   5.  Hyperlipidemia:  Continue fenofibrate and atorvastatin.      I discussed the patients findings and my recommendations with: Patient.     The patient wishes to be a FULL CODE.      This document has been electronically signed by KAVITHA Boswell on November 13, 2018 6:20 PM

## 2018-11-14 NOTE — CONSULTS
Adult Nutrition  Assessment    Patient Name:  Juliana Alcazar  YOB: 1945  MRN: [id number]  Admit Date:  11/13/2018    Assessment Date:  11/14/2018    Comments:  BMI 45 with pt at 221% IBW.  Making Lifestyle Choices for a Healthier Weight used to provide ed regarding Wt Loss Diet and diet copy given.    Reason for Assessment     Row Name 11/14/18 1432          Reason for Assessment    Reason For Assessment  diagnosis/disease state;per organizational policy Wt Loss  Diet ed     Diagnosis  other (see comments) dx Morbid Obesity     Identified At Risk by Screening Criteria  BMI;need for education                 Estimated/Assessed Needs     Row Name 11/14/18 1433          Calculation Measurements    Weight Used For Calculations  71.1 kg (156 lb 12 oz)        Estimated/Assessed Needs    Additional Documentation  Kimball-St. Jeor Equation (Group);Calorie Requirements (Group);Fluid Requirements (Group);Protein Requirements (Group)        Calorie Requirements    Estimated Calorie Requirement (kcal/day)  1567     Estimated Calorie Need Method  Kimball-St Jeor        KCAL/KG    14 Kcal/Kg (kcal)  995.4     15 Kcal/Kg (kcal)  1066.5     18 Kcal/Kg (kcal)  1279.8     20 Kcal/Kg (kcal)  1422     25 Kcal/Kg (kcal)  1777.5     30 Kcal/Kg (kcal)  2133     35 Kcal/Kg (kcal)  2488.5     40 Kcal/Kg (kcal)  2844     45 Kcal/Kg (kcal)  3199.5     50 Kcal/Kg (kcal)  3555        Kimball-St. Jeor Equation    RMR (Kimball-St. Jeor Equation)  1206        Protein Requirements    Est Protein Requirement Amount (gms/kg)  1.2 gm protein     Estimated Protein Requirements (gms/day)  85.32        Fluid Requirements    Estimated Fluid Requirements (mL/day)  1778     RDA Method (mL)  1778     Hernando-Segar Method (over 20 kg)  2922         Nutrition Prescription Ordered     Row Name 11/14/18 1438          Nutrition Prescription PO    Current PO Diet  Regular     Common Modifiers  Cardiac;Consistent Carbohydrate         Evaluation  of Received Nutrient/Fluid Intake     Row Name 11/14/18 1433          Calculation Measurements    Weight Used For Calculations  71.1 kg (156 lb 12 oz)         Evaluation of Prescribed Nutrient/Fluid Intake     Row Name 11/14/18 1433          Calculation Measurements    Weight Used For Calculations  71.1 kg (156 lb 12 oz)             Electronically signed by:  Lorena Grossman RD  11/14/18 2:38 PM

## 2018-11-14 NOTE — PLAN OF CARE
Problem: Patient Care Overview  Goal: Plan of Care Review  Outcome: Ongoing (interventions implemented as appropriate)   11/14/18 0116   Coping/Psychosocial   Plan of Care Reviewed With patient   Plan of Care Review   Progress no change     Goal: Individualization and Mutuality  Outcome: Ongoing (interventions implemented as appropriate)    Goal: Discharge Needs Assessment  Outcome: Ongoing (interventions implemented as appropriate)   11/14/18 0116   Discharge Needs Assessment   Concerns to be Addressed denies needs/concerns at this time   Patient/Family Anticipates Transition to home   Patient/Family Anticipated Services at Transition none   Equipment Needed After Discharge none   Disability   Equipment Currently Used at Home none     Goal: Interprofessional Rounds/Family Conf  Outcome: Ongoing (interventions implemented as appropriate)   11/14/18 0116   Interdisciplinary Rounds/Family Conf   Participants nursing       Problem: Cardiac: ACS (Acute Coronary Syndrome) (Adult)  Goal: Signs and Symptoms of Listed Potential Problems Will be Absent, Minimized or Managed (Cardiac: ACS)  Outcome: Ongoing (interventions implemented as appropriate)   11/14/18 0116   Goal/Outcome Evaluation   Problems Assessed (Acute Coronary Syndrome) all   Problems Present (Acute Coronary Syn) none

## 2018-11-14 NOTE — PLAN OF CARE
Problem: Patient Care Overview  Goal: Plan of Care Review  Outcome: Ongoing (interventions implemented as appropriate)  Making Lifestyle Choices for a Healthier Weight used to provide ed regarding Wt Loss Diet and diet copy given.   11/14/18 8011   Coping/Psychosocial   Plan of Care Reviewed With patient   Plan of Care Review   Progress no change   OTHER   Outcome Summary initial visit

## 2018-11-15 VITALS
OXYGEN SATURATION: 95 % | HEIGHT: 64 IN | RESPIRATION RATE: 18 BRPM | BODY MASS INDEX: 45.45 KG/M2 | SYSTOLIC BLOOD PRESSURE: 124 MMHG | DIASTOLIC BLOOD PRESSURE: 79 MMHG | TEMPERATURE: 97.7 F | HEART RATE: 61 BPM | WEIGHT: 266.2 LBS

## 2018-11-15 LAB
ANION GAP SERPL CALCULATED.3IONS-SCNC: 6 MMOL/L (ref 5–15)
BASOPHILS # BLD AUTO: 0.02 10*3/MM3 (ref 0–0.2)
BASOPHILS NFR BLD AUTO: 0.3 % (ref 0–2)
BUN BLD-MCNC: 14 MG/DL (ref 7–21)
BUN/CREAT SERPL: 17.7 (ref 7–25)
CALCIUM SPEC-SCNC: 9 MG/DL (ref 8.4–10.2)
CHLORIDE SERPL-SCNC: 102 MMOL/L (ref 95–110)
CO2 SERPL-SCNC: 30 MMOL/L (ref 22–31)
CREAT BLD-MCNC: 0.79 MG/DL (ref 0.5–1)
DEPRECATED RDW RBC AUTO: 44 FL (ref 36.4–46.3)
EOSINOPHIL # BLD AUTO: 0.22 10*3/MM3 (ref 0–0.7)
EOSINOPHIL NFR BLD AUTO: 3.4 % (ref 0–7)
ERYTHROCYTE [DISTWIDTH] IN BLOOD BY AUTOMATED COUNT: 13.2 % (ref 11.5–14.5)
GFR SERPL CREATININE-BSD FRML MDRD: 72 ML/MIN/1.73 (ref 39–90)
GLUCOSE BLD-MCNC: 130 MG/DL (ref 60–100)
GLUCOSE BLDC GLUCOMTR-MCNC: 147 MG/DL (ref 70–130)
GLUCOSE BLDC GLUCOMTR-MCNC: 158 MG/DL (ref 70–130)
HCT VFR BLD AUTO: 38.7 % (ref 35–45)
HGB BLD-MCNC: 12.8 G/DL (ref 12–15.5)
IMM GRANULOCYTES # BLD: 0.03 10*3/MM3 (ref 0–0.02)
IMM GRANULOCYTES NFR BLD: 0.5 % (ref 0–0.5)
LYMPHOCYTES # BLD AUTO: 2.48 10*3/MM3 (ref 0.6–4.2)
LYMPHOCYTES NFR BLD AUTO: 38.6 % (ref 10–50)
MCH RBC QN AUTO: 30.2 PG (ref 26.5–34)
MCHC RBC AUTO-ENTMCNC: 33.1 G/DL (ref 31.4–36)
MCV RBC AUTO: 91.3 FL (ref 80–98)
MONOCYTES # BLD AUTO: 0.58 10*3/MM3 (ref 0–0.9)
MONOCYTES NFR BLD AUTO: 9 % (ref 0–12)
NEUTROPHILS # BLD AUTO: 3.09 10*3/MM3 (ref 2–8.6)
NEUTROPHILS NFR BLD AUTO: 48.2 % (ref 37–80)
NRBC BLD MANUAL-RTO: 0 /100 WBC (ref 0–0)
PLATELET # BLD AUTO: 263 10*3/MM3 (ref 150–450)
PMV BLD AUTO: 8.1 FL (ref 8–12)
POTASSIUM BLD-SCNC: 3.9 MMOL/L (ref 3.5–5.1)
RBC # BLD AUTO: 4.24 10*6/MM3 (ref 3.77–5.16)
SODIUM BLD-SCNC: 138 MMOL/L (ref 137–145)
WBC NRBC COR # BLD: 6.42 10*3/MM3 (ref 3.2–9.8)

## 2018-11-15 PROCEDURE — 85025 COMPLETE CBC W/AUTO DIFF WBC: CPT | Performed by: NURSE PRACTITIONER

## 2018-11-15 PROCEDURE — 82962 GLUCOSE BLOOD TEST: CPT

## 2018-11-15 PROCEDURE — 80048 BASIC METABOLIC PNL TOTAL CA: CPT | Performed by: NURSE PRACTITIONER

## 2018-11-15 PROCEDURE — 90661 CCIIV3 VAC ABX FR 0.5 ML IM: CPT | Performed by: NURSE PRACTITIONER

## 2018-11-15 PROCEDURE — 25010000002 INFLUENZA VAC SUBUNIT QUAD 0.5 ML SUSPENSION PREFILLED SYRINGE: Performed by: NURSE PRACTITIONER

## 2018-11-15 PROCEDURE — G0378 HOSPITAL OBSERVATION PER HR: HCPCS

## 2018-11-15 PROCEDURE — G0008 ADMIN INFLUENZA VIRUS VAC: HCPCS | Performed by: NURSE PRACTITIONER

## 2018-11-15 RX ADMIN — METOPROLOL TARTRATE 25 MG: 25 TABLET ORAL at 09:37

## 2018-11-15 RX ADMIN — SERTRALINE HYDROCHLORIDE 100 MG: 50 TABLET ORAL at 09:36

## 2018-11-15 RX ADMIN — LISINOPRIL 20 MG: 20 TABLET ORAL at 09:37

## 2018-11-15 RX ADMIN — Medication 3 ML: at 09:39

## 2018-11-15 RX ADMIN — FENOFIBRATE 48 MG: 48 TABLET ORAL at 09:37

## 2018-11-15 RX ADMIN — Medication 100 MG: at 09:37

## 2018-11-15 RX ADMIN — FUROSEMIDE 20 MG: 20 TABLET ORAL at 09:37

## 2018-11-15 RX ADMIN — INFLUENZA A VIRUS A/SINGAPORE/GP1908/2015 IVR-180 (H1N1) ANTIGEN (MDCK CELL DERIVED, PROPIOLACTONE INACTIVATED), INFLUENZA A VIRUS A/NORTH CAROLINA/04/2016 (H3N2) HEMAGGLUTININ ANTIGEN (MDCK CELL DERIVED, PROPIOLACTONE INACTIVATED), INFLUENZA B VIRUS B/IOWA/06/2017 HEMAGGLUTININ ANTIGEN (MDCK CELL DERIVED, PROPIOLACTONE INACTIVATED), INFLUENZA B VIRUS B/SINGAPORE/INFTT-16-0610/2016 HEMAGGLUTININ ANTIGEN (MDCK CELL DERIVED, PROPIOLACTONE INACTIVATED) 0.5 ML: 15; 15; 15; 15 INJECTION, SUSPENSION INTRAMUSCULAR at 11:53

## 2018-11-15 NOTE — DISCHARGE SUMMARY
[facility] Medicine Services  DISCHARGE SUMMARY       Date of Admission: 11/13/2018  Date of Discharge:  11/15/2018  Primary Care Physician: Miranda Llanes APRN    Presenting Problem/History of Present Illness:  Chest pain [R07.9]  Chest pain, unspecified type [R07.9]       Final Discharge Diagnoses:  Active Hospital Problems    Diagnosis   • Chest pain       Consults:   Consults     No orders found from 10/15/2018 to 11/14/2018.          Procedures Performed:                 Pertinent Test Results:   Lab Results (most recent)     Procedure Component Value Units Date/Time    POC Glucose Once [[id number]]  (Abnormal) Collected:  11/15/18 0633    Specimen:  Blood Updated:  11/15/18 0743     Glucose 147 mg/dL      Comment: RN NotifiedOperator: [id number] JONYLISY VELARadha ID: [id number]       Basic Metabolic Panel [[id number]]  (Abnormal) Collected:  11/15/18 0555    Specimen:  Blood Updated:  11/15/18 0644     Glucose 130 mg/dL      BUN 14 mg/dL      Creatinine 0.79 mg/dL      Sodium 138 mmol/L      Potassium 3.9 mmol/L      Chloride 102 mmol/L      CO2 30.0 mmol/L      Calcium 9.0 mg/dL      eGFR Non African Amer 72 mL/min/1.73      BUN/Creatinine Ratio 17.7     Anion Gap 6.0 mmol/L     Narrative:       The MDRD GFR formula is only valid for adults with stable renal function between ages 18 and 70.    CBC & Differential [[id number]] Collected:  11/15/18 0555    Specimen:  Blood Updated:  11/15/18 0613    Narrative:       The following orders were created for panel order CBC & Differential.  Procedure                               Abnormality         Status                     ---------                               -----------         ------                     CBC Auto Differential[[id number]]        Abnormal            Final result                 Please view results for these tests on the individual orders.    CBC Auto Differential [[id number]]  (Abnormal) Collected:   11/15/18 0555    Specimen:  Blood Updated:  11/15/18 0613     WBC 6.42 10*3/mm3      RBC 4.24 10*6/mm3      Hemoglobin 12.8 g/dL      Hematocrit 38.7 %      MCV 91.3 fL      MCH 30.2 pg      MCHC 33.1 g/dL      RDW 13.2 %      RDW-SD 44.0 fl      MPV 8.1 fL      Platelets 263 10*3/mm3      Neutrophil % 48.2 %      Lymphocyte % 38.6 %      Monocyte % 9.0 %      Eosinophil % 3.4 %      Basophil % 0.3 %      Immature Grans % 0.5 %      Neutrophils, Absolute 3.09 10*3/mm3      Lymphocytes, Absolute 2.48 10*3/mm3      Monocytes, Absolute 0.58 10*3/mm3      Eosinophils, Absolute 0.22 10*3/mm3      Basophils, Absolute 0.02 10*3/mm3      Immature Grans, Absolute 0.03 10*3/mm3      nRBC 0.0 /100 WBC     POC Glucose Once [[id number]]  (Abnormal) Collected:  11/14/18 1951    Specimen:  Blood Updated:  11/15/18 0153     Glucose 158 mg/dL      Comment: RN NotifiedOperator: [id number] JONY KYLEMeter ID: [id number]       POC Glucose Once [[id number]]  (Abnormal) Collected:  11/14/18 1512    Specimen:  Blood Updated:  11/14/18 1654     Glucose 168 mg/dL      Comment: RN NotifiedOperator: [id number] VAIBHAV TIFFANYMeter ID: [id number]       POC Glucose Once [[id number]]  (Normal) Collected:  11/13/18 1957    Specimen:  Blood Updated:  11/14/18 1050     Glucose 125 mg/dL      Comment: Sliding Scale AdminRN NotifiedOperator: [id number] LAPRADD PAMMeter ID: [id number]       POC Glucose Once [[id number]]  (Abnormal) Collected:  11/14/18 1022    Specimen:  Blood Updated:  11/14/18 1035     Glucose 141 mg/dL      Comment: RN NotifiedOperator: [id number] VAIBHAV TIFFANYMeter ID: [id number]       POC Glucose Once [[id number]]  (Abnormal) Collected:  11/14/18 0642    Specimen:  Blood Updated:  11/14/18 0659     Glucose 140 mg/dL      Comment: RN NotifiedOperator: [id number] KRISTINA Marcus ID: [id number]       Basic Metabolic Panel [[id number]]  (Abnormal) Collected:  11/14/18 0601    Specimen:  Blood Updated:  11/14/18 0637      Glucose 128 mg/dL      BUN 14 mg/dL      Creatinine 0.81 mg/dL      Sodium 137 mmol/L      Potassium 3.8 mmol/L      Chloride 104 mmol/L      CO2 28.0 mmol/L      Calcium 8.8 mg/dL      eGFR Non African Amer 70 mL/min/1.73      BUN/Creatinine Ratio 17.3     Anion Gap 5.0 mmol/L     Narrative:       The MDRD GFR formula is only valid for adults with stable renal function between ages 18 and 70.    Troponin [[id number]]  (Normal) Collected:  11/14/18 0601    Specimen:  Blood Updated:  11/14/18 0634     Troponin I 0.015 ng/mL     CBC & Differential [[id number]] Collected:  11/14/18 0601    Specimen:  Blood Updated:  11/14/18 0611    Narrative:       The following orders were created for panel order CBC & Differential.  Procedure                               Abnormality         Status                     ---------                               -----------         ------                     CBC Auto Differential[[id number]]        Abnormal            Final result                 Please view results for these tests on the individual orders.    CBC Auto Differential [[id number]]  (Abnormal) Collected:  11/14/18 0601    Specimen:  Blood Updated:  11/14/18 0611     WBC 9.26 10*3/mm3      RBC 4.27 10*6/mm3      Hemoglobin 12.8 g/dL      Hematocrit 37.8 %      MCV 88.5 fL      MCH 30.0 pg      MCHC 33.9 g/dL      RDW 13.0 %      RDW-SD 41.7 fl      MPV 8.5 fL      Platelets 209 10*3/mm3      Neutrophil % 72.5 %      Lymphocyte % 17.9 %      Monocyte % 6.4 %      Eosinophil % 2.7 %      Basophil % 0.1 %      Immature Grans % 0.4 %      Neutrophils, Absolute 6.71 10*3/mm3      Lymphocytes, Absolute 1.66 10*3/mm3      Monocytes, Absolute 0.59 10*3/mm3      Eosinophils, Absolute 0.25 10*3/mm3      Basophils, Absolute 0.01 10*3/mm3      Immature Grans, Absolute 0.04 10*3/mm3     Troponin [[id number]]  (Normal) Collected:  11/14/18 0116    Specimen:  Blood Updated:  11/14/18 0154     Troponin I 0.012 ng/mL     Troponin [[id number]]   (Normal) Collected:  11/13/18 1917    Specimen:  Blood Updated:  11/13/18 2026     Troponin I <0.012 ng/mL     Extra Tubes [[id number]] Collected:  11/13/18 1440    Specimen:  Blood, Venous Line Updated:  11/13/18 1545    Narrative:       The following orders were created for panel order Extra Tubes.  Procedure                               Abnormality         Status                     ---------                               -----------         ------                     Gold Top - SST[[id number]]                                   Final result                 Please view results for these tests on the individual orders.    Gold Top - SST [[id number]] Collected:  11/13/18 1440    Specimen:  Blood Updated:  11/13/18 1545     Extra Tube Hold for add-ons.     Comment: Auto resulted.       Troponin [[id number]]  (Normal) Collected:  11/13/18 1440    Specimen:  Blood Updated:  11/13/18 1540     Troponin I <0.012 ng/mL     CK-MB [[id number]]  (Normal) Collected:  11/13/18 1440    Specimen:  Blood Updated:  11/13/18 1540     CKMB 1.15 ng/mL     BNP [[id number]]  (Normal) Collected:  11/13/18 1440    Specimen:  Blood Updated:  11/13/18 1540     proBNP 180.0 pg/mL     Comprehensive Metabolic Panel [[id number]]  (Abnormal) Collected:  11/13/18 1440    Specimen:  Blood Updated:  11/13/18 1527     Glucose 135 mg/dL      BUN 15 mg/dL      Creatinine 0.87 mg/dL      Sodium 137 mmol/L      Potassium 3.9 mmol/L      Chloride 101 mmol/L      CO2 28.0 mmol/L      Calcium 9.1 mg/dL      Total Protein 6.4 g/dL      Albumin 3.90 g/dL      ALT (SGPT) 23 U/L      AST (SGOT) 22 U/L      Alkaline Phosphatase 53 U/L      Total Bilirubin 0.5 mg/dL      eGFR Non African Amer 64 mL/min/1.73      Globulin 2.5 gm/dL      A/G Ratio 1.6 g/dL      BUN/Creatinine Ratio 17.2     Anion Gap 8.0 mmol/L     Narrative:       The MDRD GFR formula is only valid for adults with stable renal function between ages 18 and 70.    CK [[id number]]  (Normal) Collected:   11/13/18 1440    Specimen:  Blood Updated:  11/13/18 1527     Creatine Kinase 39 U/L     aPTT [[id number]]  (Normal) Collected:  11/13/18 1440    Specimen:  Blood Updated:  11/13/18 1516     PTT 39.6 seconds     Narrative:       The recommended Heparin therapeutic range is 68-97 seconds.    D-dimer, Quantitative [[id number]]  (Normal) Collected:  11/13/18 1440    Specimen:  Blood Updated:  11/13/18 1516     D-Dimer, Quantitative 354 ng/mL (FEU)     Narrative:       Dimer values <500 ng/ml FEU are FDA approved as aid in diagnosis of deep venous thrombosis and pulmonary embolism.  This test should not be used in an exclusion strategy with pretest probability alone.    A recent guideline regarding diagnosis for pulmonary thromboembolism recommends an adjusted exclusion criterion of age x 10 ng/ml FEU for patients >50 years of age ([employer] Intern Med 2015; 163: 701-711).    Protime-INR [[id number]]  (Abnormal) Collected:  11/13/18 1440    Specimen:  Blood Updated:  11/13/18 1513     Protime 18.8 Seconds      INR 1.63    Narrative:       Therapeutic range for most indications is 2.0-3.0 INR,  or 2.5-3.5 for mechanical heart valves.    CBC & Differential [[id number]] Collected:  11/13/18 1440    Specimen:  Blood Updated:  11/13/18 1457    Narrative:       The following orders were created for panel order CBC & Differential.  Procedure                               Abnormality         Status                     ---------                               -----------         ------                     CBC Auto Differential[[id number]]        Normal              Final result                 Please view results for these tests on the individual orders.    CBC Auto Differential [[id number]]  (Normal) Collected:  11/13/18 1440    Specimen:  Blood Updated:  11/13/18 1457     WBC 6.80 10*3/mm3      RBC 4.26 10*6/mm3      Hemoglobin 12.8 g/dL      Hematocrit 38.1 %      MCV 89.4 fL      MCH 30.0 pg      MCHC 33.6 g/dL      RDW 13.1 %       RDW-SD 42.4 fl      MPV 8.9 fL      Platelets 237 10*3/mm3      Neutrophil % 51.0 %      Lymphocyte % 35.9 %      Monocyte % 9.0 %      Eosinophil % 3.7 %      Basophil % 0.1 %      Immature Grans % 0.3 %      Neutrophils, Absolute 3.47 10*3/mm3      Lymphocytes, Absolute 2.44 10*3/mm3      Monocytes, Absolute 0.61 10*3/mm3      Eosinophils, Absolute 0.25 10*3/mm3      Basophils, Absolute 0.01 10*3/mm3      Immature Grans, Absolute 0.02 10*3/mm3         Imaging Results (most recent)     Procedure Component Value Units Date/Time    XR Chest 1 View [[id number]] Collected:  11/13/18 1423     Updated:  11/13/18 1447    Narrative:         PROCEDURE: Single chest view portable erect    REASON FOR EXAM:soa    FINDINGS: Comparison exam dated September 10, 2018. Cardiac and  pulmonary vasculature are normal. Left upper lung field stable  small calcified lung parenchymal granuloma consistent with old  granulomatous disease. Lungs are otherwise clear. Pleural spaces  are normal. No acute osseous abnormality.      Impression:       1.  Evidence of old granulomatous disease.  2.  Otherwise unremarkable chest.    Electronically signed by:  Abdias Ayala MD  11/13/2018 2:46 PM CST  Workstation: [id number]          Chief Complaint on Day of Discharge:  Reflux    Hospital Course:  The patient is a 73 y.o. female who presented to [facility] with chest pain.  Chest pain was atypical in nature.  Starting in her back.  Myocardial infarction was excluded cardiac enzymes ×3, EKG, telemetry.  She was in normal sinus rhythm during the entirety of her hospital stay.  She states that she recently had a negative stress test.  This was not repeated this hospital stay.  She was continued on amiodarone throughout her stay, and she remained in normal sinus rhythm.  She complained of some reflux type pain on day of discharge.  She states that this might be the pain that she eats.  States on the day of admission.  She states that she  "failed to tell us about her reflux symptoms.  She will be set up with her primary care provider, gastroenterology, and she already has an appointment with primary cardiologist.    Condition on Discharge:  stable    Physical Exam on Discharge:  /79 (BP Location: Right arm)   Pulse 61   Temp 97.7 °F (36.5 °C) (Temporal)   Resp 18   Ht 162.6 cm (64\")   Wt 121 kg (266 lb 3.2 oz)   SpO2 95%   BMI 45.69 kg/m²      Physical Exam   Constitutional: She appears well-developed and well-nourished.   HENT:   Head: Normocephalic and atraumatic.   Eyes: EOM are normal. Pupils are equal, round, and reactive to light.   Neck: Normal range of motion. Neck supple.   Cardiovascular: Normal rate, regular rhythm and normal heart sounds. Exam reveals no gallop and no friction rub.   No murmur heard.  Pulmonary/Chest: Effort normal and breath sounds normal. No respiratory distress. She has no wheezes. She has no rales. She exhibits no tenderness.   Abdominal: Soft. Bowel sounds are normal. She exhibits no distension. There is no tenderness. There is no guarding.   Musculoskeletal: She exhibits no edema.   Skin: Skin is warm and dry.   Psychiatric: She has a normal mood and affect. Her behavior is normal. Thought content normal.   Vitals reviewed.          Discharge Disposition:  Home or Self Care    Discharge Medications:     Discharge Medications      Changes to Medications      Instructions Start Date   amiodarone 200 MG tablet  Commonly known as:  PACERONE  What changed:    · how much to take  · when to take this   200 mg, Oral, Daily      metoprolol tartrate 50 MG tablet  Commonly known as:  LOPRESSOR  What changed:    · how much to take  · how to take this  · when to take this  · additional instructions   1/2 tab bid      rivaroxaban 20 MG tablet  Commonly known as:  XARELTO  What changed:  when to take this   20 mg, Oral, Daily      sertraline 50 MG tablet  Commonly known as:  ZOLOFT  What changed:  Another medication " with the same name was changed. Make sure you understand how and when to take each.   50 mg, Oral, Nightly      sertraline 100 MG tablet  Commonly known as:  ZOLOFT  What changed:    · how much to take  · how to take this  · when to take this  · additional instructions   1 tab in the am and 1/2 in the pm         Continue These Medications      Instructions Start Date   atorvastatin 40 MG tablet  Commonly known as:  LIPITOR   40 mg, Oral, Nightly      fenofibrate 54 MG tablet  Commonly known as:  LOFIBRA   54 mg, Oral, Daily      furosemide 20 MG tablet  Commonly known as:  LASIX   20 mg, Oral, Daily      lisinopril 20 MG tablet  Commonly known as:  PRINIVIL,ZESTRIL   20 mg, Oral, Daily      metFORMIN 500 MG tablet  Commonly known as:  GLUCOPHAGE   500 mg, Oral, Daily With Breakfast      oxyCODONE-acetaminophen  MG per tablet  Commonly known as:  PERCOCET   1 tablet, Oral, Every 6 Hours PRN             Discharge Diet:   Diet Instructions     Advance Diet As Tolerated            Activity at Discharge:   Activity Instructions     Activity as Tolerated            Follow-up Appointments:   Future Appointments   Date Time Provider Department Center   11/20/2018  1:45 PM Miranda Llanes APRN [facility] None   11/29/2018  3:45 PM Juan Knapp PA-C [facility] None       Test Results Pending at Discharge:         This document has been electronically signed by Darius Mae MD on November 15, 2018 5:00 PM      Time: 35 min

## 2018-11-15 NOTE — PLAN OF CARE
Problem: Patient Care Overview  Goal: Plan of Care Review  Outcome: Ongoing (interventions implemented as appropriate)   11/15/18 0521   Coping/Psychosocial   Plan of Care Reviewed With patient   Plan of Care Review   Progress improving     Goal: Individualization and Mutuality  Outcome: Ongoing (interventions implemented as appropriate)    Goal: Discharge Needs Assessment  Outcome: Ongoing (interventions implemented as appropriate)    Goal: Interprofessional Rounds/Family Conf  Outcome: Ongoing (interventions implemented as appropriate)      Problem: Cardiac: ACS (Acute Coronary Syndrome) (Adult)  Goal: Signs and Symptoms of Listed Potential Problems Will be Absent, Minimized or Managed (Cardiac: ACS)  Outcome: Ongoing (interventions implemented as appropriate)

## 2018-11-20 ENCOUNTER — LAB (OUTPATIENT)
Dept: LAB | Facility: HOSPITAL | Age: 73
End: 2018-11-20

## 2018-11-20 ENCOUNTER — OFFICE VISIT (OUTPATIENT)
Dept: FAMILY MEDICINE CLINIC | Facility: CLINIC | Age: 73
End: 2018-11-20

## 2018-11-20 ENCOUNTER — TELEPHONE (OUTPATIENT)
Dept: FAMILY MEDICINE CLINIC | Facility: CLINIC | Age: 73
End: 2018-11-20

## 2018-11-20 VITALS
RESPIRATION RATE: 20 BRPM | BODY MASS INDEX: 46.95 KG/M2 | HEART RATE: 84 BPM | TEMPERATURE: 98.4 F | SYSTOLIC BLOOD PRESSURE: 124 MMHG | WEIGHT: 275 LBS | OXYGEN SATURATION: 98 % | HEIGHT: 64 IN | DIASTOLIC BLOOD PRESSURE: 62 MMHG

## 2018-11-20 DIAGNOSIS — R10.11 RIGHT UPPER QUADRANT PAIN: Primary | ICD-10-CM

## 2018-11-20 DIAGNOSIS — R53.83 OTHER FATIGUE: ICD-10-CM

## 2018-11-20 PROCEDURE — 99214 OFFICE O/P EST MOD 30 MIN: CPT | Performed by: NURSE PRACTITIONER

## 2018-11-20 PROCEDURE — 82607 VITAMIN B-12: CPT | Performed by: NURSE PRACTITIONER

## 2018-11-20 PROCEDURE — 84443 ASSAY THYROID STIM HORMONE: CPT | Performed by: NURSE PRACTITIONER

## 2018-11-20 PROCEDURE — 82746 ASSAY OF FOLIC ACID SERUM: CPT

## 2018-11-20 NOTE — PROGRESS NOTES
Subjective   Juliana Alcazar is a 73 y.o. female.     Here today for nissa.  She was having some discomfort in her chest.  Was hospitalized and they decided she had reflux.  She cont to have some abd/chest discomfort.  Is worse when she eats.  She also c/o some right upper quad tenderness.      Heartburn   She complains of abdominal pain, belching, chest pain and heartburn. She reports no choking, no coughing, no early satiety, no globus sensation, no hoarse voice, no nausea, no sore throat, no stridor, no tooth decay, no water brash or no wheezing. This is a new problem. The current episode started 1 to 4 weeks ago. The problem occurs constantly. The problem has been gradually worsening. The heartburn is of moderate intensity. The heartburn does not wake her from sleep. The heartburn limits her activity. The heartburn doesn't change with position. The symptoms are aggravated by certain foods and lying down. Pertinent negatives include no anemia, fatigue, melena, muscle weakness, orthopnea or weight loss. There are no known risk factors. She has tried nothing for the symptoms. The treatment provided no relief. Past procedures do not include an abdominal ultrasound, an EGD, esophageal manometry, esophageal pH monitoring, H. pylori antibody titer or a UGI. lap band.        The following portions of the patient's history were reviewed and updated as appropriate: allergies, current medications, past family history, past medical history, past social history, past surgical history and problem list.    Review of Systems   Constitutional: Negative.  Negative for fatigue and unexpected weight loss.   HENT: Negative.  Negative for hoarse voice and sore throat.    Respiratory: Negative.  Negative for cough, choking and wheezing.    Cardiovascular: Positive for chest pain.   Gastrointestinal: Positive for abdominal pain. Negative for melena and nausea.   Musculoskeletal: Negative.  Negative for muscle weakness.   Skin: Negative.     Neurological: Negative.    Psychiatric/Behavioral: Negative.        Objective   Physical Exam   Constitutional: She is oriented to person, place, and time. She appears well-developed and well-nourished. No distress.   HENT:   Head: Normocephalic.   Eyes: Pupils are equal, round, and reactive to light.   Neck: Normal range of motion. Neck supple. No thyromegaly present.   Cardiovascular: Normal rate, regular rhythm and normal heart sounds. Exam reveals no friction rub.   No murmur heard.  Pulmonary/Chest: Effort normal and breath sounds normal. No stridor. No respiratory distress. She has no wheezes. She has no rales.   Abdominal: Soft. Bowel sounds are normal. She exhibits no distension. There is tenderness (right upper quad tenderness).   Musculoskeletal: Normal range of motion.   Neurological: She is oriented to person, place, and time.   Skin: Skin is warm and dry.   Psychiatric: She has a normal mood and affect. Thought content normal.   Vitals reviewed.        Assessment/Plan   Juliana was seen today for heartburn.    Diagnoses and all orders for this visit:    Right upper quadrant pain  Comments:  she hsa protonix at home, she may take them.  Orders:  -     US Gallbladder; Future    Other fatigue  -     Vitamin B12  -     Folate; Future  -     TSH

## 2018-11-21 LAB
FOLATE SERPL-MCNC: 7.04 NG/ML (ref 2.76–21)
TSH SERPL DL<=0.05 MIU/L-ACNC: 2.09 MIU/ML (ref 0.46–4.68)
VIT B12 BLD-MCNC: 256 PG/ML (ref 239–931)

## 2018-11-29 ENCOUNTER — OFFICE VISIT (OUTPATIENT)
Dept: GASTROENTEROLOGY | Facility: CLINIC | Age: 73
End: 2018-11-29

## 2018-11-29 VITALS
HEIGHT: 64 IN | SYSTOLIC BLOOD PRESSURE: 132 MMHG | DIASTOLIC BLOOD PRESSURE: 74 MMHG | WEIGHT: 273.2 LBS | HEART RATE: 66 BPM | BODY MASS INDEX: 46.64 KG/M2

## 2018-11-29 DIAGNOSIS — R07.89 OTHER CHEST PAIN: Primary | ICD-10-CM

## 2018-11-29 DIAGNOSIS — K57.30 DIVERTICULOSIS OF LARGE INTESTINE WITHOUT HEMORRHAGE: ICD-10-CM

## 2018-11-29 DIAGNOSIS — R10.10 PAIN OF UPPER ABDOMEN: ICD-10-CM

## 2018-11-29 PROCEDURE — 99212 OFFICE O/P EST SF 10 MIN: CPT | Performed by: PHYSICIAN ASSISTANT

## 2018-11-29 NOTE — PATIENT INSTRUCTIONS

## 2018-11-29 NOTE — PROGRESS NOTES
Chief Complaint   Patient presents with   • Nausea   • Vomiting   • Abdominal Pain   • Diverticulosis       ENDO PROCEDURE ORDERED:    Subjective    Juliana Alcazar is a 73 y.o. female. she is here today for follow-up.    History of Present Illness    Patient seen on a recheck of her nausea, vomiting, epigastric pain, diverticulosis, history of Lap-Band. Last seen 06/20/2017. She was accompanied by her . She states she has a history of atrial fibrillation. Recently she was sitting in the car while her  was getting gas and she felt a hard pain between her shoulder blades while she was sitting. She thought she had had a heart attack. Went to the hospital here and was admitted with chest pain 11/13-15/2018. Cardiac enzymes were negative. INR was 1.63. Chest x-ray showed old granulomatous disease. Normal CBC. BMP showed glucose 130. They cleared her and she had no recurrent symptoms. She states she does see a heart doctor at [facility].     More recent laboratory on 11/20/2018 showed normal TSH, folate. B12 was low normal at 256.    Patient currently denies abdominal pain, heartburn, nausea, vomiting. She still has occasional problems if she takes too big a piece of meat or bread. She will choke and have to vomit to clear it. She wants to try to have her Lap-Band drained, not necessarily removed. She does not recall who she saw to have it placed. Her bowels are regular without blood or mucus. Weight is up 4 pounds since last visit. Last EGD/colonoscopy 03/31/2017 showed esophagitis, gastritis, diverticulosis.    ASSESSMENT AND PLAN: Patient with recent episode of chest discomfort of indeterminate etiology or significance. She has had no recurrent symptoms. At this point I did not recommend any further endoscopic evaluation as long as she remains asymptomatic. Will be happy to see her back at any point if she has recurrent symptoms. I did suggest she may benefit from some B12 supplementation. She should  follow up with her bariatric surgeon. She would like to see if she can get her reservoir drained. Will try to check with our surgeons. I am not certain they would be agreeable to this. We probably cannot get her seen by Dr. Markham in [place] but will try to check on this for her as well. Otherwise, she was recommended to follow up at her next routine screening, sooner if needed.       The following portions of the patient's history were reviewed and updated as appropriate:   Past Medical History:   Diagnosis Date   • Acute sinusitis    • Anorectal fistula     Anorectal fistula - status post repair      • Atrial fibrillation (CMS/HCC)    • Backache    • Carpal tunnel syndrome    • Chest pain, non-cardiac    • Degenerative joint disease involving multiple joints    • Depressive disorder    • Diarrhea    • Dyspnea    • Electrocardiogram abnormal    • Essential hypertension    • Female stress incontinence    • Generalized anxiety disorder    • GERD (gastroesophageal reflux disease)    • H/O tubal ligation    • Hemorrhoids    • Hypercholesterolemia    • Hyperlipidemia    • Hypertensive disorder    • Low back pain     C/O - low back pain      • Normal gynecologic examination    • Obesity    • Otitis media, left    • Palpitations     a   • Primary fibromyalgia syndrome    • Sleep apnea    • Tachycardia    • Type 2 diabetes mellitus (CMS/HCC)      Past Surgical History:   Procedure Laterality Date   • BARIATRIC SURGERY     • BREAST BIOPSY     • CARDIAC ABLATION     • ENDOSCOPY AND COLONOSCOPY  09/27/2000     A single small sessile polyp was seen in the rectum which measured 1 mm.211.3 External hemorrhoids were present. 455.3    • INJECTION OF MEDICATION  01/07/2016    Celestone (betamethasone) (2)        • JOINT REPLACEMENT      right knee 2008   • KNEE ARTHROSCOPY  07/09/2008     Right total knee atthroplasty. Osteoarthritis of the right knee.    • STOMACH SURGERY  1976    Revise stomach-bowel fusion (1)    history of  jejunal surgery    • TONSILLECTOMY     • TUBAL ABDOMINAL LIGATION     • UPPER GASTROINTESTINAL ENDOSCOPY  2017     Family History   Problem Relation Age of Onset   • Hyperlipidemia Mother    • Hypertension Mother    • Lung cancer Mother    • Hyperlipidemia Father    • Hypertension Father    • Lung cancer Sister    • Gallbladder disease Maternal Grandmother    • Heart disease Paternal Grandmother    • Diabetes Paternal Grandfather      OB History      Para Term  AB Living    2         2    SAB TAB Ectopic Molar Multiple Live Births                       Allergies   Allergen Reactions   • Adhesive Tape    • Celebrex [Celecoxib]    • Darvon [Propoxyphene]    • Demerol [Meperidine]    • Dilantin [Phenytoin]    • Dilaudid [Hydromorphone Hcl]    • Iodinated Diagnostic Agents    • Latex    • Morphine And Related    • Nsaids    • Penicillins Swelling     Patient had a reaction to penicillin in .  Reaction included hives and swelling.  Patient states she has tolerated cephalexin in the past.   • Phenergan [Promethazine]    • Vioxx [Rofecoxib]    • Vistaril [Hydroxyzine Hcl]      Social History     Socioeconomic History   • Marital status:      Spouse name: Not on file   • Number of children: Not on file   • Years of education: Not on file   • Highest education level: Not on file   Tobacco Use   • Smoking status: Never Smoker   • Smokeless tobacco: Never Used   Substance and Sexual Activity   • Alcohol use: No   • Drug use: No   • Sexual activity: Not Currently     Partners: Male       Current Outpatient Medications:   •  amiodarone (PACERONE) 200 MG tablet, Take 1 tablet by mouth Daily. (Patient taking differently: Take 100 mg by mouth 2 (Two) Times a Day.), Disp: 90 tablet, Rfl: 1  •  atorvastatin (LIPITOR) 40 MG tablet, Take 1 tablet by mouth Every Night., Disp: 90 tablet, Rfl: 1  •  fenofibrate (LOFIBRA) 54 MG tablet, Take 1 tablet by mouth Daily., Disp: 90 tablet, Rfl: 1  •  furosemide  "(LASIX) 20 MG tablet, Take 1 tablet by mouth Daily., Disp: 90 tablet, Rfl: 1  •  lisinopril (PRINIVIL,ZESTRIL) 20 MG tablet, Take 1 tablet by mouth Daily., Disp: 90 tablet, Rfl: 1  •  metFORMIN (GLUCOPHAGE) 500 MG tablet, Take 1 tablet by mouth Daily With Breakfast., Disp: 90 tablet, Rfl: 1  •  metoprolol tartrate (LOPRESSOR) 50 MG tablet, 1/2 tab bid (Patient taking differently: Take 25 mg by mouth Every 12 (Twelve) Hours. 1/2 tab bid), Disp: 180 tablet, Rfl: 1  •  oxyCODONE-acetaminophen (PERCOCET)  MG per tablet, Take 1 tablet by mouth Every 6 (Six) Hours As Needed for Moderate Pain ., Disp: , Rfl:   •  rivaroxaban (XARELTO) 20 MG tablet, Take 1 tablet by mouth Daily. (Patient taking differently: Take 20 mg by mouth Daily With Dinner.), Disp: 90 tablet, Rfl: 1  •  sertraline (ZOLOFT) 100 MG tablet, 1 tab in the am and 1/2 in the pm (Patient taking differently: Take 100 mg by mouth Daily. 1 tab in the am and 1/2 in the pm), Disp: 135 tablet, Rfl: 1  •  sertraline (ZOLOFT) 50 MG tablet, Take 50 mg by mouth Every Night., Disp: , Rfl:   Review of Systems  Review of Systems       Objective    /74 (BP Location: Left arm)   Pulse 66   Ht 162.6 cm (64\")   Wt 124 kg (273 lb 3.2 oz)   BMI 46.89 kg/m²   Physical Exam   Constitutional: She is oriented to person, place, and time. She appears well-developed and well-nourished. No distress.   HENT:   Head: Normocephalic and atraumatic.   Eyes: EOM are normal. Pupils are equal, round, and reactive to light.   Neck: Normal range of motion.   Cardiovascular: Normal rate, regular rhythm and normal heart sounds.   Pulmonary/Chest: Effort normal and breath sounds normal.   Abdominal: Soft. Bowel sounds are normal. She exhibits no shifting dullness, no distension, no abdominal bruit, no ascites and no mass. There is no hepatosplenomegaly. There is tenderness. There is no rigidity, no rebound, no guarding and no CVA tenderness. No hernia. Hernia confirmed negative in " the ventral area.   Mild obese, port   Musculoskeletal: Normal range of motion.   Neurological: She is alert and oriented to person, place, and time.   Skin: Skin is warm and dry.   Psychiatric: She has a normal mood and affect. Her behavior is normal. Judgment and thought content normal.   Nursing note and vitals reviewed.    Assessment/Plan      1. Other chest pain    2. Pain of upper abdomen    3. Diverticulosis of large intestine without hemorrhage    .   Juliana was seen today for nausea, vomiting, abdominal pain and diverticulosis.    Diagnoses and all orders for this visit:    Other chest pain    Pain of upper abdomen    Diverticulosis of large intestine without hemorrhage        Orders placed during this encounter include:  No orders of the defined types were placed in this encounter.      Medications prescribed:  No orders of the defined types were placed in this encounter.      Requested Prescriptions      No prescriptions requested or ordered in this encounter       Review and/or summary of lab tests, radiology, procedures, medications. Review and summary of old records and obtaining of history. The risks and benefits of my recommendations, as well as other treatment options were discussed with the patient today. Questions were answered.    Follow-up: Return if symptoms worsen or fail to improve.     * Surgery not found *      This document has been electronically signed by Juan Knapp PA-C on November 30, 2018 11:43 AM      Results for orders placed or performed in visit on 11/20/18   Folate   Result Value Ref Range    Folate 7.04 2.76 - 21.00 ng/mL   Results for orders placed or performed in visit on 11/20/18   TSH   Result Value Ref Range    TSH 2.090 0.460 - 4.680 mIU/mL   Vitamin B12   Result Value Ref Range    Vitamin B-12 256 239 - 931 pg/mL   Results for orders placed or performed during the hospital encounter of 11/13/18   Gold Top - SST   Result Value Ref Range    Extra Tube Hold for  add-ons.    CK-MB   Result Value Ref Range    CKMB 1.15 0.00 - 5.00 ng/mL   CBC Auto Differential   Result Value Ref Range    WBC 6.42 3.20 - 9.80 10*3/mm3    RBC 4.24 3.77 - 5.16 10*6/mm3    Hemoglobin 12.8 12.0 - 15.5 g/dL    Hematocrit 38.7 35.0 - 45.0 %    MCV 91.3 80.0 - 98.0 fL    MCH 30.2 26.5 - 34.0 pg    MCHC 33.1 31.4 - 36.0 g/dL    RDW 13.2 11.5 - 14.5 %    RDW-SD 44.0 36.4 - 46.3 fl    MPV 8.1 8.0 - 12.0 fL    Platelets 263 150 - 450 10*3/mm3    Neutrophil % 48.2 37.0 - 80.0 %    Lymphocyte % 38.6 10.0 - 50.0 %    Monocyte % 9.0 0.0 - 12.0 %    Eosinophil % 3.4 0.0 - 7.0 %    Basophil % 0.3 0.0 - 2.0 %    Immature Grans % 0.5 0.0 - 0.5 %    Neutrophils, Absolute 3.09 2.00 - 8.60 10*3/mm3    Lymphocytes, Absolute 2.48 0.60 - 4.20 10*3/mm3    Monocytes, Absolute 0.58 0.00 - 0.90 10*3/mm3    Eosinophils, Absolute 0.22 0.00 - 0.70 10*3/mm3    Basophils, Absolute 0.02 0.00 - 0.20 10*3/mm3    Immature Grans, Absolute 0.03 (H) 0.00 - 0.02 10*3/mm3    nRBC 0.0 0.0 - 0.0 /100 WBC   CBC Auto Differential   Result Value Ref Range    WBC 9.26 3.20 - 9.80 10*3/mm3    RBC 4.27 3.77 - 5.16 10*6/mm3    Hemoglobin 12.8 12.0 - 15.5 g/dL    Hematocrit 37.8 35.0 - 45.0 %    MCV 88.5 80.0 - 98.0 fL    MCH 30.0 26.5 - 34.0 pg    MCHC 33.9 31.4 - 36.0 g/dL    RDW 13.0 11.5 - 14.5 %    RDW-SD 41.7 36.4 - 46.3 fl    MPV 8.5 8.0 - 12.0 fL    Platelets 209 150 - 450 10*3/mm3    Neutrophil % 72.5 37.0 - 80.0 %    Lymphocyte % 17.9 10.0 - 50.0 %    Monocyte % 6.4 0.0 - 12.0 %    Eosinophil % 2.7 0.0 - 7.0 %    Basophil % 0.1 0.0 - 2.0 %    Immature Grans % 0.4 0.0 - 0.5 %    Neutrophils, Absolute 6.71 2.00 - 8.60 10*3/mm3    Lymphocytes, Absolute 1.66 0.60 - 4.20 10*3/mm3    Monocytes, Absolute 0.59 0.00 - 0.90 10*3/mm3    Eosinophils, Absolute 0.25 0.00 - 0.70 10*3/mm3    Basophils, Absolute 0.01 0.00 - 0.20 10*3/mm3    Immature Grans, Absolute 0.04 (H) 0.00 - 0.02 10*3/mm3   CBC Auto Differential   Result Value Ref Range    WBC  6.80 3.20 - 9.80 10*3/mm3    RBC 4.26 3.77 - 5.16 10*6/mm3    Hemoglobin 12.8 12.0 - 15.5 g/dL    Hematocrit 38.1 35.0 - 45.0 %    MCV 89.4 80.0 - 98.0 fL    MCH 30.0 26.5 - 34.0 pg    MCHC 33.6 31.4 - 36.0 g/dL    RDW 13.1 11.5 - 14.5 %    RDW-SD 42.4 36.4 - 46.3 fl    MPV 8.9 8.0 - 12.0 fL    Platelets 237 150 - 450 10*3/mm3    Neutrophil % 51.0 37.0 - 80.0 %    Lymphocyte % 35.9 10.0 - 50.0 %    Monocyte % 9.0 0.0 - 12.0 %    Eosinophil % 3.7 0.0 - 7.0 %    Basophil % 0.1 0.0 - 2.0 %    Immature Grans % 0.3 0.0 - 0.5 %    Neutrophils, Absolute 3.47 2.00 - 8.60 10*3/mm3    Lymphocytes, Absolute 2.44 0.60 - 4.20 10*3/mm3    Monocytes, Absolute 0.61 0.00 - 0.90 10*3/mm3    Eosinophils, Absolute 0.25 0.00 - 0.70 10*3/mm3    Basophils, Absolute 0.01 0.00 - 0.20 10*3/mm3    Immature Grans, Absolute 0.02 0.00 - 0.02 10*3/mm3   Troponin   Result Value Ref Range    Troponin I 0.015 <=0.034 ng/mL   Troponin   Result Value Ref Range    Troponin I 0.012 <=0.034 ng/mL   Troponin   Result Value Ref Range    Troponin I <0.012 <=0.034 ng/mL   Troponin   Result Value Ref Range    Troponin I <0.012 <=0.034 ng/mL   aPTT   Result Value Ref Range    PTT 39.6 20.0 - 40.3 seconds   Protime-INR   Result Value Ref Range    Protime 18.8 (H) 11.1 - 15.3 Seconds    INR 1.63 (H) 0.80 - 1.20   D-dimer, Quantitative   Result Value Ref Range    D-Dimer, Quantitative 354 0 - 470 ng/mL (FEU)   POC Glucose Once   Result Value Ref Range    Glucose 147 (H) 70 - 130 mg/dL   POC Glucose Once   Result Value Ref Range    Glucose 158 (H) 70 - 130 mg/dL   POC Glucose Once   Result Value Ref Range    Glucose 168 (H) 70 - 130 mg/dL   POC Glucose Once   Result Value Ref Range    Glucose 125 70 - 130 mg/dL   POC Glucose Once   Result Value Ref Range    Glucose 141 (H) 70 - 130 mg/dL   POC Glucose Once   Result Value Ref Range    Glucose 140 (H) 70 - 130 mg/dL   BNP   Result Value Ref Range    proBNP 180.0 0.0 - 900.0 pg/mL   CK   Result Value Ref Range     Creatine Kinase 39 30 - 135 U/L   Comprehensive Metabolic Panel   Result Value Ref Range    Glucose 135 (H) 60 - 100 mg/dL    BUN 15 7 - 21 mg/dL    Creatinine 0.87 0.50 - 1.00 mg/dL    Sodium 137 137 - 145 mmol/L    Potassium 3.9 3.5 - 5.1 mmol/L    Chloride 101 95 - 110 mmol/L    CO2 28.0 22.0 - 31.0 mmol/L    Calcium 9.1 8.4 - 10.2 mg/dL    Total Protein 6.4 6.3 - 8.6 g/dL    Albumin 3.90 3.40 - 4.80 g/dL    ALT (SGPT) 23 9 - 52 U/L    AST (SGOT) 22 14 - 36 U/L    Alkaline Phosphatase 53 38 - 126 U/L    Total Bilirubin 0.5 0.2 - 1.3 mg/dL    eGFR Non African Amer 64 39 - 90 mL/min/1.73    Globulin 2.5 2.3 - 3.5 gm/dL    A/G Ratio 1.6 1.1 - 1.8 g/dL    BUN/Creatinine Ratio 17.2 7.0 - 25.0    Anion Gap 8.0 5.0 - 15.0 mmol/L     *Note: Due to a large number of results and/or encounters for the requested time period, some results have not been displayed. A complete set of results can be found in Results Review.       Some portions of this note have been dictated using voice recognition software and may contain errors and/or omissions.

## 2019-03-16 DIAGNOSIS — I10 ESSENTIAL HYPERTENSION: ICD-10-CM

## 2019-03-16 DIAGNOSIS — E11.9 TYPE 2 DIABETES MELLITUS WITHOUT COMPLICATION, WITHOUT LONG-TERM CURRENT USE OF INSULIN (HCC): ICD-10-CM

## 2019-03-16 DIAGNOSIS — I48.91 ATRIAL FIBRILLATION, UNSPECIFIED TYPE (HCC): ICD-10-CM

## 2019-03-16 DIAGNOSIS — E78.5 HYPERLIPIDEMIA, UNSPECIFIED HYPERLIPIDEMIA TYPE: ICD-10-CM

## 2019-03-18 RX ORDER — FUROSEMIDE 20 MG/1
20 TABLET ORAL DAILY
Qty: 90 TABLET | Refills: 1 | Status: SHIPPED | OUTPATIENT
Start: 2019-03-18 | End: 2019-10-25 | Stop reason: SDUPTHER

## 2019-03-18 RX ORDER — ATORVASTATIN CALCIUM 40 MG/1
40 TABLET, FILM COATED ORAL NIGHTLY
Qty: 90 TABLET | Refills: 1 | Status: SHIPPED | OUTPATIENT
Start: 2019-03-18 | End: 2019-10-25 | Stop reason: SDUPTHER

## 2019-03-18 RX ORDER — RIVAROXABAN 20 MG/1
TABLET, FILM COATED ORAL
Qty: 90 TABLET | Refills: 1 | Status: SHIPPED | OUTPATIENT
Start: 2019-03-18 | End: 2020-03-03 | Stop reason: SDUPTHER

## 2019-06-01 DIAGNOSIS — E78.5 HYPERLIPIDEMIA, UNSPECIFIED HYPERLIPIDEMIA TYPE: ICD-10-CM

## 2019-06-03 RX ORDER — FENOFIBRATE 54 MG/1
54 TABLET ORAL DAILY
Qty: 90 TABLET | Refills: 1 | Status: SHIPPED | OUTPATIENT
Start: 2019-06-03 | End: 2019-10-25 | Stop reason: SDUPTHER

## 2019-09-21 DIAGNOSIS — I10 ESSENTIAL HYPERTENSION: ICD-10-CM

## 2019-09-21 DIAGNOSIS — E11.9 TYPE 2 DIABETES MELLITUS WITHOUT COMPLICATION, WITHOUT LONG-TERM CURRENT USE OF INSULIN (HCC): ICD-10-CM

## 2019-09-21 DIAGNOSIS — F32.A DEPRESSION, UNSPECIFIED DEPRESSION TYPE: ICD-10-CM

## 2019-09-21 DIAGNOSIS — E78.5 HYPERLIPIDEMIA, UNSPECIFIED HYPERLIPIDEMIA TYPE: ICD-10-CM

## 2019-09-23 RX ORDER — FENOFIBRATE 54 MG/1
54 TABLET ORAL DAILY
Qty: 90 TABLET | Refills: 1 | OUTPATIENT
Start: 2019-09-23

## 2019-09-23 RX ORDER — ATORVASTATIN CALCIUM 40 MG/1
TABLET, FILM COATED ORAL
Qty: 90 TABLET | Refills: 1 | OUTPATIENT
Start: 2019-09-23

## 2019-09-23 RX ORDER — LISINOPRIL 20 MG/1
20 TABLET ORAL DAILY
Qty: 90 TABLET | Refills: 1 | OUTPATIENT
Start: 2019-09-23

## 2019-09-23 RX ORDER — SERTRALINE HYDROCHLORIDE 100 MG/1
TABLET, FILM COATED ORAL
Qty: 135 TABLET | Refills: 1 | OUTPATIENT
Start: 2019-09-23

## 2019-09-23 RX ORDER — FUROSEMIDE 20 MG/1
20 TABLET ORAL DAILY
Qty: 90 TABLET | Refills: 1 | OUTPATIENT
Start: 2019-09-23

## 2019-10-09 DIAGNOSIS — I10 ESSENTIAL HYPERTENSION: ICD-10-CM

## 2019-10-09 DIAGNOSIS — F32.A DEPRESSION, UNSPECIFIED DEPRESSION TYPE: ICD-10-CM

## 2019-10-10 RX ORDER — LISINOPRIL 20 MG/1
20 TABLET ORAL DAILY
Qty: 90 TABLET | Refills: 1 | OUTPATIENT
Start: 2019-10-10

## 2019-10-10 RX ORDER — SERTRALINE HYDROCHLORIDE 100 MG/1
TABLET, FILM COATED ORAL
Qty: 135 TABLET | Refills: 1 | OUTPATIENT
Start: 2019-10-10

## 2019-10-10 RX ORDER — METOPROLOL TARTRATE 50 MG/1
TABLET, FILM COATED ORAL
Qty: 180 TABLET | Refills: 1 | OUTPATIENT
Start: 2019-10-10

## 2019-10-22 ENCOUNTER — TELEPHONE (OUTPATIENT)
Dept: FAMILY MEDICINE CLINIC | Facility: CLINIC | Age: 74
End: 2019-10-22

## 2019-10-22 NOTE — TELEPHONE ENCOUNTER
Patient called wanting to know why [facility] denied her medications.  Advised was because patient has not been seen in almost a year last seen 11/18/2018

## 2019-10-24 ENCOUNTER — TELEPHONE (OUTPATIENT)
Dept: FAMILY MEDICINE CLINIC | Facility: CLINIC | Age: 74
End: 2019-10-24

## 2019-10-25 ENCOUNTER — OFFICE VISIT (OUTPATIENT)
Dept: FAMILY MEDICINE CLINIC | Facility: CLINIC | Age: 74
End: 2019-10-25

## 2019-10-25 ENCOUNTER — APPOINTMENT (OUTPATIENT)
Dept: LAB | Facility: HOSPITAL | Age: 74
End: 2019-10-25

## 2019-10-25 VITALS
BODY MASS INDEX: 47.63 KG/M2 | WEIGHT: 279 LBS | RESPIRATION RATE: 18 BRPM | HEIGHT: 64 IN | SYSTOLIC BLOOD PRESSURE: 151 MMHG | TEMPERATURE: 97.5 F | HEART RATE: 78 BPM | DIASTOLIC BLOOD PRESSURE: 76 MMHG | OXYGEN SATURATION: 98 %

## 2019-10-25 DIAGNOSIS — Z12.31 ENCOUNTER FOR SCREENING MAMMOGRAM FOR MALIGNANT NEOPLASM OF BREAST: ICD-10-CM

## 2019-10-25 DIAGNOSIS — Z12.39 SCREENING FOR BREAST CANCER: ICD-10-CM

## 2019-10-25 DIAGNOSIS — E78.5 HYPERLIPIDEMIA, UNSPECIFIED HYPERLIPIDEMIA TYPE: ICD-10-CM

## 2019-10-25 DIAGNOSIS — I10 ESSENTIAL HYPERTENSION: ICD-10-CM

## 2019-10-25 DIAGNOSIS — I48.91 ATRIAL FIBRILLATION, UNSPECIFIED TYPE (HCC): ICD-10-CM

## 2019-10-25 DIAGNOSIS — E66.01 MORBIDLY OBESE (HCC): ICD-10-CM

## 2019-10-25 DIAGNOSIS — F32.A DEPRESSION, UNSPECIFIED DEPRESSION TYPE: ICD-10-CM

## 2019-10-25 DIAGNOSIS — E11.9 TYPE 2 DIABETES MELLITUS WITHOUT COMPLICATION, WITHOUT LONG-TERM CURRENT USE OF INSULIN (HCC): Primary | ICD-10-CM

## 2019-10-25 DIAGNOSIS — Z13.29 SCREENING FOR THYROID DISORDER: ICD-10-CM

## 2019-10-25 LAB
ALBUMIN SERPL-MCNC: 4 G/DL (ref 3.5–5.2)
ALBUMIN/GLOB SERPL: 1.2 G/DL
ALP SERPL-CCNC: 56 U/L (ref 39–117)
ALT SERPL W P-5'-P-CCNC: 27 U/L (ref 1–33)
ANION GAP SERPL CALCULATED.3IONS-SCNC: 11.2 MMOL/L (ref 5–15)
AST SERPL-CCNC: 25 U/L (ref 1–32)
BILIRUB SERPL-MCNC: 0.4 MG/DL (ref 0.2–1.2)
BUN BLD-MCNC: 18 MG/DL (ref 8–23)
BUN/CREAT SERPL: 17.8 (ref 7–25)
CALCIUM SPEC-SCNC: 9.7 MG/DL (ref 8.6–10.5)
CHLORIDE SERPL-SCNC: 105 MMOL/L (ref 98–107)
CHOLEST SERPL-MCNC: 144 MG/DL (ref 0–200)
CO2 SERPL-SCNC: 27.8 MMOL/L (ref 22–29)
CREAT BLD-MCNC: 1.01 MG/DL (ref 0.57–1)
GFR SERPL CREATININE-BSD FRML MDRD: 54 ML/MIN/1.73
GLOBULIN UR ELPH-MCNC: 3.3 GM/DL
GLUCOSE BLD-MCNC: 127 MG/DL (ref 65–99)
HBA1C MFR BLD: 6.58 % (ref 4.8–5.6)
HDLC SERPL-MCNC: 37 MG/DL (ref 40–60)
LDLC SERPL CALC-MCNC: 74 MG/DL (ref 0–100)
LDLC/HDLC SERPL: 2 {RATIO}
POTASSIUM BLD-SCNC: 4.9 MMOL/L (ref 3.5–5.2)
PROT SERPL-MCNC: 7.3 G/DL (ref 6–8.5)
SODIUM BLD-SCNC: 144 MMOL/L (ref 136–145)
TRIGL SERPL-MCNC: 165 MG/DL (ref 0–150)
TSH SERPL DL<=0.05 MIU/L-ACNC: 3.13 UIU/ML (ref 0.27–4.2)
VLDLC SERPL-MCNC: 33 MG/DL (ref 5–40)

## 2019-10-25 PROCEDURE — 84443 ASSAY THYROID STIM HORMONE: CPT | Performed by: NURSE PRACTITIONER

## 2019-10-25 PROCEDURE — 80053 COMPREHEN METABOLIC PANEL: CPT | Performed by: NURSE PRACTITIONER

## 2019-10-25 PROCEDURE — 99214 OFFICE O/P EST MOD 30 MIN: CPT | Performed by: NURSE PRACTITIONER

## 2019-10-25 PROCEDURE — 83036 HEMOGLOBIN GLYCOSYLATED A1C: CPT | Performed by: NURSE PRACTITIONER

## 2019-10-25 PROCEDURE — 80061 LIPID PANEL: CPT | Performed by: NURSE PRACTITIONER

## 2019-10-25 RX ORDER — LISINOPRIL 20 MG/1
20 TABLET ORAL DAILY
Qty: 90 TABLET | Refills: 1 | Status: SHIPPED | OUTPATIENT
Start: 2019-10-25 | End: 2019-12-23

## 2019-10-25 RX ORDER — ATORVASTATIN CALCIUM 40 MG/1
40 TABLET, FILM COATED ORAL NIGHTLY
Qty: 90 TABLET | Refills: 1 | Status: SHIPPED | OUTPATIENT
Start: 2019-10-25 | End: 2020-08-11 | Stop reason: SDUPTHER

## 2019-10-25 RX ORDER — SERTRALINE HYDROCHLORIDE 100 MG/1
100 TABLET, FILM COATED ORAL DAILY
Qty: 135 TABLET | Refills: 1 | Status: SHIPPED | OUTPATIENT
Start: 2019-10-25 | End: 2019-12-23

## 2019-10-25 RX ORDER — FENOFIBRATE 54 MG/1
54 TABLET ORAL DAILY
Qty: 90 TABLET | Refills: 1 | Status: SHIPPED | OUTPATIENT
Start: 2019-10-25 | End: 2021-03-17 | Stop reason: SDUPTHER

## 2019-10-25 RX ORDER — FUROSEMIDE 20 MG/1
20 TABLET ORAL DAILY
Qty: 90 TABLET | Refills: 1 | Status: SHIPPED | OUTPATIENT
Start: 2019-10-25 | End: 2020-08-11 | Stop reason: SDUPTHER

## 2019-10-25 RX ORDER — PANTOPRAZOLE SODIUM 40 MG/1
1 TABLET, DELAYED RELEASE ORAL DAILY
COMMUNITY
Start: 2019-09-17 | End: 2021-03-18

## 2019-11-04 PROBLEM — E66.01 MORBIDLY OBESE: Status: ACTIVE | Noted: 2019-11-04

## 2019-11-04 NOTE — PROGRESS NOTES
Subjective   Juliana Alcazar is a 74 y.o. female.     Here today for nissa on her dm and her htn.  She sees a specialist in [place] for her atrial fib.  She has been hospitalized x2 in the last few months due to this.  She reports her b/s running aout 120.  Other issues are depression and hyperlip.      Hypertension   This is a chronic problem. The current episode started more than 1 year ago. The problem is controlled. Pertinent negatives include no anxiety, blurred vision, chest pain, headaches, malaise/fatigue, neck pain, orthopnea, palpitations, peripheral edema, PND, shortness of breath or sweats. There are no associated agents to hypertension. Risk factors for coronary artery disease include diabetes mellitus, dyslipidemia, post-menopausal state, obesity and sedentary lifestyle. Past treatments include ACE inhibitors and diuretics. Current antihypertension treatment includes ACE inhibitors and diuretics. The current treatment provides significant improvement. There are no compliance problems.  There is no history of angina, kidney disease, CAD/MI, CVA, heart failure, left ventricular hypertrophy, PVD or retinopathy.   Atrial Fibrillation   Presents for follow-up visit. Symptoms include hypertension. Symptoms are negative for an AICD problem, bradycardia, chest pain, dizziness, hemodynamic instability, hypotension, pacemaker problem, palpitations, shortness of breath, syncope, tachycardia and weakness. The symptoms have been worsening. Past medical history includes atrial fibrillation. There are no medication compliance problems.   Diabetes   She presents for her follow-up diabetic visit. She has type 2 diabetes mellitus. Her disease course has been stable. There are no hypoglycemic associated symptoms. Pertinent negatives for hypoglycemia include no confusion, dizziness, headaches, hunger, mood changes, nervousness/anxiousness, pallor, seizures, sleepiness, speech difficulty, sweats or tremors. Pertinent  negatives for diabetes include no blurred vision, no chest pain, no fatigue, no foot paresthesias, no foot ulcerations, no polydipsia, no polyphagia, no polyuria, no visual change, no weakness and no weight loss. There are no hypoglycemic complications. Symptoms are stable. Diabetic complications include heart disease. Pertinent negatives for diabetic complications include no CVA, impotence, nephropathy, peripheral neuropathy, PVD or retinopathy. Risk factors for coronary artery disease include obesity, hypertension, dyslipidemia and diabetes mellitus. Current diabetic treatment includes oral agent (monotherapy). She is compliant with treatment all of the time. Her weight is stable. She is following a diabetic diet. Meal planning includes avoidance of concentrated sweets. She rarely participates in exercise. She monitors blood glucose at home 1-2 x per day. Blood glucose monitoring compliance is excellent. Her breakfast blood glucose is taken between 7-8 am. Her breakfast blood glucose range is generally 110-130 mg/dl. Her overall blood glucose range is 110-130 mg/dl. An ACE inhibitor/angiotensin II receptor blocker is being taken. She does not see a podiatrist.Eye exam is current.        The following portions of the patient's history were reviewed and updated as appropriate: allergies, current medications, past family history, past medical history, past social history, past surgical history and problem list.    Review of Systems   Constitutional: Negative.  Negative for fatigue, malaise/fatigue and unexpected weight loss.   HENT: Negative.    Eyes: Negative.  Negative for blurred vision.   Respiratory: Negative.  Negative for shortness of breath.    Cardiovascular: Negative.  Negative for chest pain, palpitations, orthopnea, syncope and PND.   Gastrointestinal: Negative.    Endocrine: Negative.  Negative for polydipsia, polyphagia and polyuria.   Genitourinary: Negative.  Negative for impotence.   Musculoskeletal:  Negative.  Negative for neck pain.   Skin: Negative.  Negative for pallor.   Allergic/Immunologic: Negative.    Neurological: Negative for dizziness, tremors, seizures, speech difficulty, weakness and confusion.   Hematological: Negative.    Psychiatric/Behavioral: The patient is not nervous/anxious.        Objective   Physical Exam   Constitutional: She is oriented to person, place, and time. She appears well-developed and well-nourished. No distress.   HENT:   Head: Normocephalic and atraumatic.   Right Ear: External ear normal.   Left Ear: External ear normal.   Nose: Nose normal.   Mouth/Throat: Oropharynx is clear and moist. No oropharyngeal exudate.   Eyes: Pupils are equal, round, and reactive to light.   Neck: Normal range of motion. Neck supple. No thyromegaly present.   Cardiovascular: Normal rate, regular rhythm and normal heart sounds. Exam reveals no friction rub.   No murmur heard.  Pulmonary/Chest: Effort normal and breath sounds normal. No respiratory distress. She has no wheezes. She has no rales.   Abdominal: Soft.   Musculoskeletal: Normal range of motion.   Neurological: She is alert and oriented to person, place, and time.   Skin: Skin is warm and dry.   Psychiatric: She has a normal mood and affect. Thought content normal.   Nursing note and vitals reviewed.        Assessment/Plan   Juliana was seen today for depression, hypertension and atrial fibrillation.    Diagnoses and all orders for this visit:    Type 2 diabetes mellitus without complication, without long-term current use of insulin (CMS/[facility])  -     Hemoglobin A1c  -     MicroAlbumin, Urine, Random - Urine, Clean Catch    Essential hypertension  -     lisinopril (PRINIVIL,ZESTRIL) 20 MG tablet; Take 1 tablet by mouth Daily.  -     furosemide (LASIX) 20 MG tablet; Take 1 tablet by mouth Daily.  -     Comprehensive metabolic panel    Hyperlipidemia, unspecified hyperlipidemia type  -     atorvastatin (LIPITOR) 40 MG tablet; Take 1 tablet by  mouth Every Night.  -     fenofibrate (LOFIBRA) 54 MG tablet; Take 1 tablet by mouth Daily.  -     Lipid Panel    Depression, unspecified depression type  -     sertraline (ZOLOFT) 100 MG tablet; Take 1 tablet by mouth Daily. 1 tab in the am and 1/2 in the pm    Screening for thyroid disorder  -     TSH    Screening for breast cancer  -     Mammo Screening Bilateral With CAD; Future    Encounter for screening mammogram for malignant neoplasm of breast   -     Mammo Screening Bilateral With CAD; Future    Atrial fibrillation, unspecified type (CMS/HCC)    Morbidly obese (CMS/HCC)  Comments:  bmi 47.9, diet and exercise info given.

## 2019-11-04 NOTE — PATIENT INSTRUCTIONS
Calorie Counting for Weight Loss  Calories are units of energy. Your body needs a certain amount of calories from food to keep you going throughout the day. When you eat more calories than your body needs, your body stores the extra calories as fat. When you eat fewer calories than your body needs, your body burns fat to get the energy it needs.  Calorie counting means keeping track of how many calories you eat and drink each day. Calorie counting can be helpful if you need to lose weight. If you make sure to eat fewer calories than your body needs, you should lose weight. Ask your health care provider what a healthy weight is for you.  For calorie counting to work, you will need to eat the right number of calories in a day in order to lose a healthy amount of weight per week. A dietitian can help you determine how many calories you need in a day and will give you suggestions on how to reach your calorie goal.  · A healthy amount of weight to lose per week is usually 1-2 lb (0.5-0.9 kg). This usually means that your daily calorie intake should be reduced by 500-750 calories.  · Eating 1,200 - 1,500 calories per day can help most women lose weight.  · Eating 1,500 - 1,800 calories per day can help most men lose weight.  What is my plan?  My goal is to have __________ calories per day.  If I have this many calories per day, I should lose around __________ pounds per week.  What do I need to know about calorie counting?  In order to meet your daily calorie goal, you will need to:  · Find out how many calories are in each food you would like to eat. Try to do this before you eat.  · Decide how much of the food you plan to eat.  · Write down what you ate and how many calories it had. Doing this is called keeping a food log.  To successfully lose weight, it is important to balance calorie counting with a healthy lifestyle that includes regular activity. Aim for 150 minutes of moderate exercise (such as walking) or 75  minutes of vigorous exercise (such as running) each week.  Where do I find calorie information?    The number of calories in a food can be found on a Nutrition Facts label. If a food does not have a Nutrition Facts label, try to look up the calories online or ask your dietitian for help.  Remember that calories are listed per serving. If you choose to have more than one serving of a food, you will have to multiply the calories per serving by the amount of servings you plan to eat. For example, the label on a package of bread might say that a serving size is 1 slice and that there are 90 calories in a serving. If you eat 1 slice, you will have eaten 90 calories. If you eat 2 slices, you will have eaten 180 calories.  How do I keep a food log?  Immediately after each meal, record the following information in your food log:  · What you ate. Don't forget to include toppings, sauces, and other extras on the food.  · How much you ate. This can be measured in cups, ounces, or number of items.  · How many calories each food and drink had.  · The total number of calories in the meal.  Keep your food log near you, such as in a small notebook in your pocket, or use a mobile isaac or website. Some programs will calculate calories for you and show you how many calories you have left for the day to meet your goal.  What are some calorie counting tips?    · Use your calories on foods and drinks that will fill you up and not leave you hungry:  ? Some examples of foods that fill you up are nuts and nut butters, vegetables, lean proteins, and high-fiber foods like whole grains. High-fiber foods are foods with more than 5 g fiber per serving.  ? Drinks such as sodas, specialty coffee drinks, alcohol, and juices have a lot of calories, yet do not fill you up.  · Eat nutritious foods and avoid empty calories. Empty calories are calories you get from foods or beverages that do not have many vitamins or protein, such as candy, sweets, and  "soda. It is better to have a nutritious high-calorie food (such as an avocado) than a food with few nutrients (such as a bag of chips).  · Know how many calories are in the foods you eat most often. This will help you calculate calorie counts faster.  · Pay attention to calories in drinks. Low-calorie drinks include water and unsweetened drinks.  · Pay attention to nutrition labels for \"low fat\" or \"fat free\" foods. These foods sometimes have the same amount of calories or more calories than the full fat versions. They also often have added sugar, starch, or salt, to make up for flavor that was removed with the fat.  · Find a way of tracking calories that works for you. Get creative. Try different apps or programs if writing down calories does not work for you.  What are some portion control tips?  · Know how many calories are in a serving. This will help you know how many servings of a certain food you can have.  · Use a measuring cup to measure serving sizes. You could also try weighing out portions on a kitchen scale. With time, you will be able to estimate serving sizes for some foods.  · Take some time to put servings of different foods on your favorite plates, bowls, and cups so you know what a serving looks like.  · Try not to eat straight from a bag or box. Doing this can lead to overeating. Put the amount you would like to eat in a cup or on a plate to make sure you are eating the right portion.  · Use smaller plates, glasses, and bowls to prevent overeating.  · Try not to multitask (for example, watch TV or use your computer) while eating. If it is time to eat, sit down at a table and enjoy your food. This will help you to know when you are full. It will also help you to be aware of what you are eating and how much you are eating.  What are tips for following this plan?  Reading food labels  · Check the calorie count compared to the serving size. The serving size may be smaller than what you are used to " "eating.  · Check the source of the calories. Make sure the food you are eating is high in vitamins and protein and low in saturated and trans fats.  Shopping  · Read nutrition labels while you shop. This will help you make healthy decisions before you decide to purchase your food.  · Make a grocery list and stick to it.  Cooking  · Try to cook your favorite foods in a healthier way. For example, try baking instead of frying.  · Use low-fat dairy products.  Meal planning  · Use more fruits and vegetables. Half of your plate should be fruits and vegetables.  · Include lean proteins like poultry and fish.  How do I count calories when eating out?  · Ask for smaller portion sizes.  · Consider sharing an entree and sides instead of getting your own entree.  · If you get your own entree, eat only half. Ask for a box at the beginning of your meal and put the rest of your entree in it so you are not tempted to eat it.  · If calories are listed on the menu, choose the lower calorie options.  · Choose dishes that include vegetables, fruits, whole grains, low-fat dairy products, and lean protein.  · Choose items that are boiled, broiled, grilled, or steamed. Stay away from items that are buttered, battered, fried, or served with cream sauce. Items labeled \"crispy\" are usually fried, unless stated otherwise.  · Choose water, low-fat milk, unsweetened iced tea, or other drinks without added sugar. If you want an alcoholic beverage, choose a lower calorie option such as a glass of wine or light beer.  · Ask for dressings, sauces, and syrups on the side. These are usually high in calories, so you should limit the amount you eat.  · If you want a salad, choose a garden salad and ask for grilled meats. Avoid extra toppings like perry, cheese, or fried items. Ask for the dressing on the side, or ask for olive oil and vinegar or lemon to use as dressing.  · Estimate how many servings of a food you are given. For example, a serving of " cooked rice is ½ cup or about the size of half a baseball. Knowing serving sizes will help you be aware of how much food you are eating at restaurants. The list below tells you how big or small some common portion sizes are based on everyday objects:  ? 1 oz--4 stacked dice.  ? 3 oz--1 deck of cards.  ? 1 tsp--1 die.  ? 1 Tbsp--½ a ping-pong ball.  ? 2 Tbsp--1 ping-pong ball.  ? ½ cup--½ baseball.  ? 1 cup--1 baseball.  Summary  · Calorie counting means keeping track of how many calories you eat and drink each day. If you eat fewer calories than your body needs, you should lose weight.  · A healthy amount of weight to lose per week is usually 1-2 lb (0.5-0.9 kg). This usually means reducing your daily calorie intake by 500-750 calories.  · The number of calories in a food can be found on a Nutrition Facts label. If a food does not have a Nutrition Facts label, try to look up the calories online or ask your dietitian for help.  · Use your calories on foods and drinks that will fill you up, and not on foods and drinks that will leave you hungry.  · Use smaller plates, glasses, and bowls to prevent overeating.  This information is not intended to replace advice given to you by your health care provider. Make sure you discuss any questions you have with your health care provider.  Document Released: 12/18/2006 Document Revised: 09/06/2019 Document Reviewed: 11/17/2017  [employer] Interactive Patient Education © 2019 [employer]      Exercising to Lose Weight  Exercise is structured, repetitive physical activity to improve fitness and health. Getting regular exercise is important for everyone. It is especially important if you are overweight. Being overweight increases your risk of heart disease, stroke, diabetes, high blood pressure, and several types of cancer. Reducing your calorie intake and exercising can help you lose weight.  Exercise is usually categorized as moderate or vigorous intensity. To lose weight, most  people need to do a certain amount of moderate-intensity or vigorous-intensity exercise each week.  Moderate-intensity exercise    Moderate-intensity exercise is any activity that gets you moving enough to burn at least three times more energy (calories) than if you were sitting.  Examples of moderate exercise include:  · Walking a mile in 15 minutes.  · Doing light yard work.  · Biking at an easy pace.  Most people should get at least 150 minutes (2 hours and 30 minutes) a week of moderate-intensity exercise to maintain their body weight.  Vigorous-intensity exercise  Vigorous-intensity exercise is any activity that gets you moving enough to burn at least six times more calories than if you were sitting. When you exercise at this intensity, you should be working hard enough that you are not able to carry on a conversation.  Examples of vigorous exercise include:  · Running.  · Playing a team sport, such as football, basketball, and soccer.  · Jumping rope.  Most people should get at least 75 minutes (1 hour and 15 minutes) a week of vigorous-intensity exercise to maintain their body weight.  How can exercise affect me?  When you exercise enough to burn more calories than you eat, you lose weight. Exercise also reduces body fat and builds muscle. The more muscle you have, the more calories you burn. Exercise also:  · Improves mood.  · Reduces stress and tension.  · Improves your overall fitness, flexibility, and endurance.  · Increases bone strength.  The amount of exercise you need to lose weight depends on:  · Your age.  · The type of exercise.  · Any health conditions you have.  · Your overall physical ability.  Talk to your health care provider about how much exercise you need and what types of activities are safe for you.  What actions can I take to lose weight?  Nutrition    · Make changes to your diet as told by your health care provider or diet and nutrition specialist (dietitian). This may  include:  ? Eating fewer calories.  ? Eating more protein.  ? Eating less unhealthy fats.  ? Eating a diet that includes fresh fruits and vegetables, whole grains, low-fat dairy products, and lean protein.  ? Avoiding foods with added fat, salt, and sugar.  · Drink plenty of water while you exercise to prevent dehydration or heat stroke.  Activity  · Choose an activity that you enjoy and set realistic goals. Your health care provider can help you make an exercise plan that works for you.  · Exercise at a moderate or vigorous intensity most days of the week.  ? The intensity of exercise may vary from person to person. You can tell how intense a workout is for you by paying attention to your breathing and heartbeat. Most people will notice their breathing and heartbeat get faster with more intense exercise.  · Do resistance training twice each week, such as:  ? Push-ups.  ? Sit-ups.  ? Lifting weights.  ? Using resistance bands.  · Getting short amounts of exercise can be just as helpful as long structured periods of exercise. If you have trouble finding time to exercise, try to include exercise in your daily routine.  ? Get up, stretch, and walk around every 30 minutes throughout the day.  ? Go for a walk during your lunch break.  ? Park your car farther away from your destination.  ? If you take public transportation, get off one stop early and walk the rest of the way.  ? Make phone calls while standing up and walking around.  ? Take the stairs instead of elevators or escalators.  · Wear comfortable clothes and shoes with good support.  · Do not exercise so much that you hurt yourself, feel dizzy, or get very short of breath.  Where to find more information  · [place] Department of Health and Human Services: www.hhs.gov  · Centers for Disease Control and Prevention (CDC): www.cdc.gov  Contact a health care provider:  · Before starting a new exercise program.  · If you have questions or concerns about your  weight.  · If you have a medical problem that keeps you from exercising.  Get help right away if you have any of the following while exercising:  · Injury.  · Dizziness.  · Difficulty breathing or shortness of breath that does not go away when you stop exercising.  · Chest pain.  · Rapid heartbeat.  Summary  · Being overweight increases your risk of heart disease, stroke, diabetes, high blood pressure, and several types of cancer.  · Losing weight happens when you burn more calories than you eat.  · Reducing the amount of calories you eat in addition to getting regular moderate or vigorous exercise each week helps you lose weight.  This information is not intended to replace advice given to you by your health care provider. Make sure you discuss any questions you have with your health care provider.  Document Released: 01/20/2012 Document Revised: 12/31/2018 Document Reviewed: 12/31/2018  [employer] Interactive Patient Education © 2019 [employer]

## 2019-11-06 ENCOUNTER — TELEPHONE (OUTPATIENT)
Dept: FAMILY MEDICINE CLINIC | Facility: CLINIC | Age: 74
End: 2019-11-06

## 2019-11-09 DIAGNOSIS — E78.5 HYPERLIPIDEMIA, UNSPECIFIED HYPERLIPIDEMIA TYPE: ICD-10-CM

## 2019-11-09 DIAGNOSIS — I10 ESSENTIAL HYPERTENSION: ICD-10-CM

## 2019-11-09 DIAGNOSIS — E11.9 TYPE 2 DIABETES MELLITUS WITHOUT COMPLICATION, WITHOUT LONG-TERM CURRENT USE OF INSULIN (HCC): ICD-10-CM

## 2019-11-11 ENCOUNTER — PROCEDURE VISIT (OUTPATIENT)
Dept: FAMILY MEDICINE CLINIC | Facility: CLINIC | Age: 74
End: 2019-11-11

## 2019-11-11 ENCOUNTER — APPOINTMENT (OUTPATIENT)
Dept: LAB | Facility: HOSPITAL | Age: 74
End: 2019-11-11

## 2019-11-11 VITALS
TEMPERATURE: 97.7 F | HEIGHT: 64 IN | BODY MASS INDEX: 45.75 KG/M2 | HEART RATE: 68 BPM | OXYGEN SATURATION: 97 % | SYSTOLIC BLOOD PRESSURE: 146 MMHG | WEIGHT: 268 LBS | RESPIRATION RATE: 20 BRPM | DIASTOLIC BLOOD PRESSURE: 86 MMHG

## 2019-11-11 DIAGNOSIS — Z12.4 ENCOUNTER FOR SCREENING FOR CERVICAL CANCER: Primary | ICD-10-CM

## 2019-11-11 DIAGNOSIS — E66.01 CLASS 3 SEVERE OBESITY DUE TO EXCESS CALORIES WITH SERIOUS COMORBIDITY AND BODY MASS INDEX (BMI) OF 45.0 TO 49.9 IN ADULT (HCC): ICD-10-CM

## 2019-11-11 DIAGNOSIS — Z12.31 ENCOUNTER FOR SCREENING MAMMOGRAM FOR BREAST CANCER: ICD-10-CM

## 2019-11-11 DIAGNOSIS — Z12.4 SCREENING FOR CERVICAL CANCER: ICD-10-CM

## 2019-11-11 PROCEDURE — 88141 CYTOPATH C/V INTERPRET: CPT | Performed by: PATHOLOGY

## 2019-11-11 PROCEDURE — 99397 PER PM REEVAL EST PAT 65+ YR: CPT | Performed by: NURSE PRACTITIONER

## 2019-11-11 PROCEDURE — 88142 CYTOPATH C/V THIN LAYER: CPT | Performed by: NURSE PRACTITIONER

## 2019-11-11 RX ORDER — FENOFIBRATE 54 MG/1
54 TABLET ORAL DAILY
Qty: 90 TABLET | Refills: 1 | Status: SHIPPED | OUTPATIENT
Start: 2019-11-11 | End: 2020-08-11 | Stop reason: SDUPTHER

## 2019-11-11 RX ORDER — ATORVASTATIN CALCIUM 40 MG/1
TABLET, FILM COATED ORAL
Qty: 90 TABLET | Refills: 1 | Status: SHIPPED | OUTPATIENT
Start: 2019-11-11 | End: 2021-03-17 | Stop reason: SDUPTHER

## 2019-11-11 RX ORDER — FUROSEMIDE 20 MG/1
20 TABLET ORAL DAILY
Qty: 90 TABLET | Refills: 1 | Status: SHIPPED | OUTPATIENT
Start: 2019-11-11 | End: 2021-03-17 | Stop reason: SDUPTHER

## 2019-11-11 NOTE — PROGRESS NOTES
"Subjective   History of Present Illness    Juliana Alcazar is a 74 y.o. female who presents for annual exam.    Obstetric History:  OB History      Para Term  AB Living    2         2    SAB TAB Ectopic Molar Multiple Live Births                        Menstrual History:     No LMP recorded. Patient is postmenopausal.   stopped having periods about age 53    Sexual History:   denies being sexually active      Current contraception: post menopausal status  History of abnormal Pap smear: no  BRANDY exposure in utero: no  Received Gardasil immunization: no  Perform regular self breast exam: yes - regular  Family history of uterine or ovarian cancer: no  Family History of cervical cancer: no  Family History of colon cancer/colon polyps: no  Regular self breast exam: no  History of abnormal mammogram: yes - has had a biopsy and was negative  Family history of breast cancer: no  History of abnormal lipids: yes - statin is taken    The following portions of the patient's history were reviewed and updated as appropriate: allergies, current medications, past family history, past medical history, past social history, past surgical history and problem list.    Review of Systems   Constitutional: Negative.    HENT: Negative.    Eyes: Negative.    Respiratory: Negative.    Cardiovascular: Negative.    Gastrointestinal: Negative.    Genitourinary: Negative.    Musculoskeletal: Negative.    Skin: Negative.    Neurological: Negative.    Psychiatric/Behavioral: Negative.        Pertinent items are noted in HPI.     Objective   Physical Exam   Constitutional: She appears well-developed and well-nourished. No distress.   Genitourinary: Rectal exam shows guaiac negative stool.   Nursing note and vitals reviewed.      /86 (BP Location: Right arm, Patient Position: Sitting, Cuff Size: Adult)   Pulse 68   Temp 97.7 °F (36.5 °C) (Tympanic)   Resp 20   Ht 162.6 cm (64\")   Wt 122 kg (268 lb)   SpO2 97%   BMI 46.00 " kg/m²     General:   alert, appears stated age and cooperative   Heart: regular rate and rhythm, S1, S2 normal, no murmur, click, rub or gallop   Lungs: clear to auscultation bilaterally   Abdomen: soft, non-tender, without masses or organomegaly   Breast: inspection negative, no nipple discharge or bleeding, no masses or nodularity palpable   Vulva: normal   Vagina: normal mucosa   Cervix: no lesions   Uterus: normal size   Adnexa: normal adnexa     Assessment/Plan   Juliana was seen today for gynecologic exam.    Diagnoses and all orders for this visit:    Encounter for screening for cervical cancer     Screening for cervical cancer  -     Liquid-based Pap Smear, Screening    Encounter for screening mammogram for breast cancer  -     Mammo Screening Bilateral With CAD; Future    Class 3 severe obesity due to excess calories with serious comorbidity and body mass index (BMI) of 45.0 to 49.9 in adult (CMS/[facility])  Comments:  diet and exercies info given        Await pap smear results.

## 2019-11-14 LAB
GEN CATEG CVX/VAG CYTO-IMP: ABNORMAL
LAB AP CASE REPORT: ABNORMAL
LAB AP GYN ADDITIONAL INFORMATION: ABNORMAL
PATH INTERP SPEC-IMP: ABNORMAL
STAT OF ADQ CVX/VAG CYTO-IMP: ABNORMAL

## 2019-11-18 NOTE — PATIENT INSTRUCTIONS
Calorie Counting for Weight Loss  Calories are units of energy. Your body needs a certain amount of calories from food to keep you going throughout the day. When you eat more calories than your body needs, your body stores the extra calories as fat. When you eat fewer calories than your body needs, your body burns fat to get the energy it needs.  Calorie counting means keeping track of how many calories you eat and drink each day. Calorie counting can be helpful if you need to lose weight. If you make sure to eat fewer calories than your body needs, you should lose weight. Ask your health care provider what a healthy weight is for you.  For calorie counting to work, you will need to eat the right number of calories in a day in order to lose a healthy amount of weight per week. A dietitian can help you determine how many calories you need in a day and will give you suggestions on how to reach your calorie goal.  · A healthy amount of weight to lose per week is usually 1-2 lb (0.5-0.9 kg). This usually means that your daily calorie intake should be reduced by 500-750 calories.  · Eating 1,200 - 1,500 calories per day can help most women lose weight.  · Eating 1,500 - 1,800 calories per day can help most men lose weight.  What is my plan?  My goal is to have __________ calories per day.  If I have this many calories per day, I should lose around __________ pounds per week.  What do I need to know about calorie counting?  In order to meet your daily calorie goal, you will need to:  · Find out how many calories are in each food you would like to eat. Try to do this before you eat.  · Decide how much of the food you plan to eat.  · Write down what you ate and how many calories it had. Doing this is called keeping a food log.  To successfully lose weight, it is important to balance calorie counting with a healthy lifestyle that includes regular activity. Aim for 150 minutes of moderate exercise (such as walking) or 75  minutes of vigorous exercise (such as running) each week.  Where do I find calorie information?    The number of calories in a food can be found on a Nutrition Facts label. If a food does not have a Nutrition Facts label, try to look up the calories online or ask your dietitian for help.  Remember that calories are listed per serving. If you choose to have more than one serving of a food, you will have to multiply the calories per serving by the amount of servings you plan to eat. For example, the label on a package of bread might say that a serving size is 1 slice and that there are 90 calories in a serving. If you eat 1 slice, you will have eaten 90 calories. If you eat 2 slices, you will have eaten 180 calories.  How do I keep a food log?  Immediately after each meal, record the following information in your food log:  · What you ate. Don't forget to include toppings, sauces, and other extras on the food.  · How much you ate. This can be measured in cups, ounces, or number of items.  · How many calories each food and drink had.  · The total number of calories in the meal.  Keep your food log near you, such as in a small notebook in your pocket, or use a mobile isaac or website. Some programs will calculate calories for you and show you how many calories you have left for the day to meet your goal.  What are some calorie counting tips?    · Use your calories on foods and drinks that will fill you up and not leave you hungry:  ? Some examples of foods that fill you up are nuts and nut butters, vegetables, lean proteins, and high-fiber foods like whole grains. High-fiber foods are foods with more than 5 g fiber per serving.  ? Drinks such as sodas, specialty coffee drinks, alcohol, and juices have a lot of calories, yet do not fill you up.  · Eat nutritious foods and avoid empty calories. Empty calories are calories you get from foods or beverages that do not have many vitamins or protein, such as candy, sweets, and  "soda. It is better to have a nutritious high-calorie food (such as an avocado) than a food with few nutrients (such as a bag of chips).  · Know how many calories are in the foods you eat most often. This will help you calculate calorie counts faster.  · Pay attention to calories in drinks. Low-calorie drinks include water and unsweetened drinks.  · Pay attention to nutrition labels for \"low fat\" or \"fat free\" foods. These foods sometimes have the same amount of calories or more calories than the full fat versions. They also often have added sugar, starch, or salt, to make up for flavor that was removed with the fat.  · Find a way of tracking calories that works for you. Get creative. Try different apps or programs if writing down calories does not work for you.  What are some portion control tips?  · Know how many calories are in a serving. This will help you know how many servings of a certain food you can have.  · Use a measuring cup to measure serving sizes. You could also try weighing out portions on a kitchen scale. With time, you will be able to estimate serving sizes for some foods.  · Take some time to put servings of different foods on your favorite plates, bowls, and cups so you know what a serving looks like.  · Try not to eat straight from a bag or box. Doing this can lead to overeating. Put the amount you would like to eat in a cup or on a plate to make sure you are eating the right portion.  · Use smaller plates, glasses, and bowls to prevent overeating.  · Try not to multitask (for example, watch TV or use your computer) while eating. If it is time to eat, sit down at a table and enjoy your food. This will help you to know when you are full. It will also help you to be aware of what you are eating and how much you are eating.  What are tips for following this plan?  Reading food labels  · Check the calorie count compared to the serving size. The serving size may be smaller than what you are used to " "eating.  · Check the source of the calories. Make sure the food you are eating is high in vitamins and protein and low in saturated and trans fats.  Shopping  · Read nutrition labels while you shop. This will help you make healthy decisions before you decide to purchase your food.  · Make a grocery list and stick to it.  Cooking  · Try to cook your favorite foods in a healthier way. For example, try baking instead of frying.  · Use low-fat dairy products.  Meal planning  · Use more fruits and vegetables. Half of your plate should be fruits and vegetables.  · Include lean proteins like poultry and fish.  How do I count calories when eating out?  · Ask for smaller portion sizes.  · Consider sharing an entree and sides instead of getting your own entree.  · If you get your own entree, eat only half. Ask for a box at the beginning of your meal and put the rest of your entree in it so you are not tempted to eat it.  · If calories are listed on the menu, choose the lower calorie options.  · Choose dishes that include vegetables, fruits, whole grains, low-fat dairy products, and lean protein.  · Choose items that are boiled, broiled, grilled, or steamed. Stay away from items that are buttered, battered, fried, or served with cream sauce. Items labeled \"crispy\" are usually fried, unless stated otherwise.  · Choose water, low-fat milk, unsweetened iced tea, or other drinks without added sugar. If you want an alcoholic beverage, choose a lower calorie option such as a glass of wine or light beer.  · Ask for dressings, sauces, and syrups on the side. These are usually high in calories, so you should limit the amount you eat.  · If you want a salad, choose a garden salad and ask for grilled meats. Avoid extra toppings like perry, cheese, or fried items. Ask for the dressing on the side, or ask for olive oil and vinegar or lemon to use as dressing.  · Estimate how many servings of a food you are given. For example, a serving of " cooked rice is ½ cup or about the size of half a baseball. Knowing serving sizes will help you be aware of how much food you are eating at restaurants. The list below tells you how big or small some common portion sizes are based on everyday objects:  ? 1 oz--4 stacked dice.  ? 3 oz--1 deck of cards.  ? 1 tsp--1 die.  ? 1 Tbsp--½ a ping-pong ball.  ? 2 Tbsp--1 ping-pong ball.  ? ½ cup--½ baseball.  ? 1 cup--1 baseball.  Summary  · Calorie counting means keeping track of how many calories you eat and drink each day. If you eat fewer calories than your body needs, you should lose weight.  · A healthy amount of weight to lose per week is usually 1-2 lb (0.5-0.9 kg). This usually means reducing your daily calorie intake by 500-750 calories.  · The number of calories in a food can be found on a Nutrition Facts label. If a food does not have a Nutrition Facts label, try to look up the calories online or ask your dietitian for help.  · Use your calories on foods and drinks that will fill you up, and not on foods and drinks that will leave you hungry.  · Use smaller plates, glasses, and bowls to prevent overeating.  This information is not intended to replace advice given to you by your health care provider. Make sure you discuss any questions you have with your health care provider.  Document Released: 12/18/2006 Document Revised: 09/06/2019 Document Reviewed: 11/17/2017  [employer] Interactive Patient Education © 2019 [employer]      Exercising to Lose Weight  Exercise is structured, repetitive physical activity to improve fitness and health. Getting regular exercise is important for everyone. It is especially important if you are overweight. Being overweight increases your risk of heart disease, stroke, diabetes, high blood pressure, and several types of cancer. Reducing your calorie intake and exercising can help you lose weight.  Exercise is usually categorized as moderate or vigorous intensity. To lose weight, most  people need to do a certain amount of moderate-intensity or vigorous-intensity exercise each week.  Moderate-intensity exercise    Moderate-intensity exercise is any activity that gets you moving enough to burn at least three times more energy (calories) than if you were sitting.  Examples of moderate exercise include:  · Walking a mile in 15 minutes.  · Doing light yard work.  · Biking at an easy pace.  Most people should get at least 150 minutes (2 hours and 30 minutes) a week of moderate-intensity exercise to maintain their body weight.  Vigorous-intensity exercise  Vigorous-intensity exercise is any activity that gets you moving enough to burn at least six times more calories than if you were sitting. When you exercise at this intensity, you should be working hard enough that you are not able to carry on a conversation.  Examples of vigorous exercise include:  · Running.  · Playing a team sport, such as football, basketball, and soccer.  · Jumping rope.  Most people should get at least 75 minutes (1 hour and 15 minutes) a week of vigorous-intensity exercise to maintain their body weight.  How can exercise affect me?  When you exercise enough to burn more calories than you eat, you lose weight. Exercise also reduces body fat and builds muscle. The more muscle you have, the more calories you burn. Exercise also:  · Improves mood.  · Reduces stress and tension.  · Improves your overall fitness, flexibility, and endurance.  · Increases bone strength.  The amount of exercise you need to lose weight depends on:  · Your age.  · The type of exercise.  · Any health conditions you have.  · Your overall physical ability.  Talk to your health care provider about how much exercise you need and what types of activities are safe for you.  What actions can I take to lose weight?  Nutrition    · Make changes to your diet as told by your health care provider or diet and nutrition specialist (dietitian). This may  include:  ? Eating fewer calories.  ? Eating more protein.  ? Eating less unhealthy fats.  ? Eating a diet that includes fresh fruits and vegetables, whole grains, low-fat dairy products, and lean protein.  ? Avoiding foods with added fat, salt, and sugar.  · Drink plenty of water while you exercise to prevent dehydration or heat stroke.  Activity  · Choose an activity that you enjoy and set realistic goals. Your health care provider can help you make an exercise plan that works for you.  · Exercise at a moderate or vigorous intensity most days of the week.  ? The intensity of exercise may vary from person to person. You can tell how intense a workout is for you by paying attention to your breathing and heartbeat. Most people will notice their breathing and heartbeat get faster with more intense exercise.  · Do resistance training twice each week, such as:  ? Push-ups.  ? Sit-ups.  ? Lifting weights.  ? Using resistance bands.  · Getting short amounts of exercise can be just as helpful as long structured periods of exercise. If you have trouble finding time to exercise, try to include exercise in your daily routine.  ? Get up, stretch, and walk around every 30 minutes throughout the day.  ? Go for a walk during your lunch break.  ? Park your car farther away from your destination.  ? If you take public transportation, get off one stop early and walk the rest of the way.  ? Make phone calls while standing up and walking around.  ? Take the stairs instead of elevators or escalators.  · Wear comfortable clothes and shoes with good support.  · Do not exercise so much that you hurt yourself, feel dizzy, or get very short of breath.  Where to find more information  · [place] Department of Health and Human Services: www.hhs.gov  · Centers for Disease Control and Prevention (CDC): www.cdc.gov  Contact a health care provider:  · Before starting a new exercise program.  · If you have questions or concerns about your  weight.  · If you have a medical problem that keeps you from exercising.  Get help right away if you have any of the following while exercising:  · Injury.  · Dizziness.  · Difficulty breathing or shortness of breath that does not go away when you stop exercising.  · Chest pain.  · Rapid heartbeat.  Summary  · Being overweight increases your risk of heart disease, stroke, diabetes, high blood pressure, and several types of cancer.  · Losing weight happens when you burn more calories than you eat.  · Reducing the amount of calories you eat in addition to getting regular moderate or vigorous exercise each week helps you lose weight.  This information is not intended to replace advice given to you by your health care provider. Make sure you discuss any questions you have with your health care provider.  Document Released: 01/20/2012 Document Revised: 12/31/2018 Document Reviewed: 12/31/2018  [employer] Interactive Patient Education © 2019 [employer]

## 2019-11-22 ENCOUNTER — TELEPHONE (OUTPATIENT)
Dept: FAMILY MEDICINE CLINIC | Facility: CLINIC | Age: 74
End: 2019-11-22

## 2019-12-05 ENCOUNTER — TELEPHONE (OUTPATIENT)
Dept: FAMILY MEDICINE CLINIC | Facility: CLINIC | Age: 74
End: 2019-12-05

## 2019-12-05 DIAGNOSIS — R87.619 ATYPICAL CERVICAL GLANDULAR CELLS: Primary | ICD-10-CM

## 2019-12-06 NOTE — TELEPHONE ENCOUNTER
----- Message from KAVITHA Lemus sent at 11/18/2019  5:01 PM CST -----  This is probably nothing, but lets have her see ob/gyn anyway

## 2019-12-20 ENCOUNTER — TELEPHONE (OUTPATIENT)
Dept: FAMILY MEDICINE CLINIC | Facility: CLINIC | Age: 74
End: 2019-12-20

## 2019-12-23 DIAGNOSIS — F32.A DEPRESSION, UNSPECIFIED DEPRESSION TYPE: ICD-10-CM

## 2019-12-23 DIAGNOSIS — I10 ESSENTIAL HYPERTENSION: ICD-10-CM

## 2019-12-23 RX ORDER — SERTRALINE HYDROCHLORIDE 100 MG/1
TABLET, FILM COATED ORAL
Qty: 135 TABLET | Refills: 1 | Status: SHIPPED | OUTPATIENT
Start: 2019-12-23 | End: 2020-08-11 | Stop reason: SDUPTHER

## 2019-12-23 RX ORDER — METOPROLOL TARTRATE 50 MG/1
TABLET, FILM COATED ORAL
Qty: 180 TABLET | Refills: 1 | Status: SHIPPED | OUTPATIENT
Start: 2019-12-23 | End: 2021-03-18 | Stop reason: SDUPTHER

## 2019-12-23 RX ORDER — LISINOPRIL 20 MG/1
20 TABLET ORAL DAILY
Qty: 90 TABLET | Refills: 1 | Status: SHIPPED | OUTPATIENT
Start: 2019-12-23 | End: 2020-08-11 | Stop reason: SDUPTHER

## 2019-12-30 ENCOUNTER — TELEPHONE (OUTPATIENT)
Dept: FAMILY MEDICINE CLINIC | Facility: CLINIC | Age: 74
End: 2019-12-30

## 2019-12-30 DIAGNOSIS — G47.30 SLEEP APNEA, UNSPECIFIED TYPE: Primary | ICD-10-CM

## 2019-12-30 NOTE — TELEPHONE ENCOUNTER
Ms. Andrews is needing a script for her CPAP supplies. A message was sent on the 20th but Miranda was out. Please call [phone]

## 2020-01-01 NOTE — PROGRESS NOTES
"FAMILY MEDICINE DAILY PROGRESS NOTE  NAME: Juliana Alcazar  : 1945  MRN: [id number]     LOS: 1 day     PROVIDER OF SERVICE: Danya Chamorro    Chief Complaint: Atrial fibrillation with RVR (CMS/HCC)    Subjective:     Interval History:  History taken from: patient  Mrs. Alcazar is a 73 yo female with a history of paroxysmal atrial fibrillation and type 2 diabetes. She presented to the ED yesterday with palpitations, chest tightness, and dyspnea. She continues to have chest \"heaviness\" and she reports that she \"feels like her hear will jump out of her chest.\" She also continues to have palpitations she describes as minor. She describes muscle stiffness that she contributes to laying flat. She denies chest pain, shortness of breath, and abdominal pain.    Review of Systems:   Review of Systems   Constitutional: Positive for fatigue. Negative for chills, diaphoresis and fever.   HENT: Negative for hearing loss, sinus pain and sinus pressure.    Eyes: Negative for pain and visual disturbance.   Respiratory: Positive for chest tightness. Negative for cough and shortness of breath.    Cardiovascular: Positive for palpitations. Negative for chest pain and leg swelling.   Gastrointestinal: Negative for abdominal pain, diarrhea, nausea and vomiting.   Genitourinary: Negative for difficulty urinating, frequency and urgency.   Musculoskeletal: Positive for myalgias. Negative for arthralgias and neck pain.   Skin: Negative for color change, pallor, rash and wound.   Neurological: Negative for dizziness, weakness and light-headedness.   Psychiatric/Behavioral: Negative for confusion. The patient is not nervous/anxious.        Objective:     Vital Signs  Temp:  [96 °F (35.6 °C)-97.6 °F (36.4 °C)] 97.6 °F (36.4 °C)  Heart Rate:  [] 79  Resp:  [18-20] 18  BP: ()/(51-94) 113/68    Physical Exam  Physical Exam   Constitutional: She is oriented to person, place, and time. She appears well-developed and " well-nourished.   HENT:   Head: Normocephalic.   Right Ear: External ear normal.   Left Ear: External ear normal.   Eyes: Conjunctivae and EOM are normal.   Neck: Normal range of motion. Neck supple.   Cardiovascular: Intact distal pulses.    Distant heart sounds.   Pulmonary/Chest: Effort normal and breath sounds normal.   Abdominal: Soft. Bowel sounds are normal.   Musculoskeletal: Normal range of motion. She exhibits no edema.   Neurological: She is alert and oriented to person, place, and time.   Skin: Skin is warm and dry.   Psychiatric: She has a normal mood and affect. Her behavior is normal. Judgment and thought content normal.       Medication Review    Current Facility-Administered Medications:   •  atorvastatin (LIPITOR) tablet 40 mg, 40 mg, Oral, Nightly, Vashti Guzman MD, 40 mg at 09/10/18 2125  •  dextrose (D50W) 25 g/ 50mL Intravenous Solution 25 g, 25 g, Intravenous, Q15 Min PRN, Vashti Guzman MD  •  dextrose (GLUTOSE) oral gel 15 g, 15 g, Oral, Q15 Min PRN, Vashti Guzman MD  •  diltiaZEM (CARDIZEM) 125 mg in 100 mL sodium chloride 0.9% (total volume 125 mL), 5-15 mg/hr, Intravenous, Titrated, Keaton Castillo MD, Last Rate: 2.5 mL/hr at 09/11/18 0149, 2.5 mg/hr at 09/11/18 0149  •  glucagon (human recombinant) (GLUCAGEN DIAGNOSTIC) injection 1 mg, 1 mg, Subcutaneous, PRN, Vashti Guzman MD  •  insulin aspart (novoLOG) injection 0-9 Units, 0-9 Units, Subcutaneous, 4x Daily AC & at Bedtime, Vashti Guzman MD  •  ondansetron (ZOFRAN) tablet 4 mg, 4 mg, Oral, Q6H PRN **OR** ondansetron ODT (ZOFRAN-ODT) disintegrating tablet 4 mg, 4 mg, Oral, Q6H PRN **OR** ondansetron (ZOFRAN) injection 4 mg, 4 mg, Intravenous, Q6H PRN, Vashti Guzman MD  •  oxyCODONE-acetaminophen (PERCOCET)  MG per tablet 1 tablet, 1 tablet, Oral, Q6H PRN, Vashti Guzman MD  •  rivaroxaban (XARELTO) tablet 20 mg, 20 mg, Oral, Daily With Dinner, Vashti Guzman MD  •   sennosides-docusate sodium (SENOKOT-S) 8.6-50 MG tablet 2 tablet, 2 tablet, Oral, Nightly PRN, Vashti Guzman MD  •  sertraline (ZOLOFT) tablet 100 mg, 100 mg, Oral, Daily, Vashti Guzman MD  •  sodium chloride 0.9 % flush 1-10 mL, 1-10 mL, Intravenous, PRN, Vashti Guzman MD  •  sodium chloride 0.9 % infusion, 25 mL/hr, Intravenous, Continuous, Vashti Guzman MD, Last Rate: 25 mL/hr at 09/11/18 0203, 25 mL/hr at 09/11/18 0203  •  tiZANidine (ZANAFLEX) tablet 4 mg, 4 mg, Oral, Q8H PRN, Vashti Guzman MD     Diagnostic Data    Lab Results (last 24 hours)     Procedure Component Value Units Date/Time    POC Glucose Once [[id number]]  (Normal) Collected:  09/11/18 0632    Specimen:  Blood Updated:  09/11/18 0643     Glucose 123 mg/dL      Comment: Result Not ConfirmedOperator: [id number] [id number]       Troponin [[id number]]  (Normal) Collected:  09/11/18 0157    Specimen:  Blood Updated:  09/11/18 0317     Troponin I <0.012 ng/mL     Basic Metabolic Panel [[id number]]  (Abnormal) Collected:  09/11/18 0157    Specimen:  Blood Updated:  09/11/18 0306     Glucose 118 (H) mg/dL      BUN 21 mg/dL      Creatinine 0.97 mg/dL      Sodium 140 mmol/L      Potassium 3.9 mmol/L      Chloride 106 mmol/L      CO2 27.0 mmol/L      Calcium 8.7 mg/dL      eGFR Non African Amer 57 mL/min/1.73      BUN/Creatinine Ratio 21.6     Anion Gap 7.0 mmol/L     Narrative:       The MDRD GFR formula is only valid for adults with stable renal function between ages 18 and 70.    CBC & Differential [[id number]] Collected:  09/11/18 0157    Specimen:  Blood Updated:  09/11/18 0252    Narrative:       The following orders were created for panel order CBC & Differential.  Procedure                               Abnormality         Status                     ---------                               -----------         ------                     CBC Auto Differential[[id number]]        Normal               Final result                 Please view results for these tests on the individual orders.    CBC Auto Differential [[id number]]  (Normal) Collected:  09/11/18 0157    Specimen:  Blood Updated:  09/11/18 0252     WBC 8.85 10*3/mm3      RBC 4.26 10*6/mm3      Hemoglobin 12.9 g/dL      Hematocrit 37.5 %      MCV 88.0 fL      MCH 30.3 pg      MCHC 34.4 g/dL      RDW 12.9 %      RDW-SD 41.2 fl      MPV 8.0 fL      Platelets 263 10*3/mm3      Neutrophil % 58.3 %      Lymphocyte % 29.8 %      Monocyte % 8.4 %      Eosinophil % 2.9 %      Basophil % 0.5 %      Immature Grans % 0.1 %      Neutrophils, Absolute 5.16 10*3/mm3      Lymphocytes, Absolute 2.64 10*3/mm3      Monocytes, Absolute 0.74 10*3/mm3      Eosinophils, Absolute 0.26 10*3/mm3      Basophils, Absolute 0.04 10*3/mm3      Immature Grans, Absolute 0.01 10*3/mm3      nRBC 0.0 /100 WBC     Extra Tubes [[id number]] Collected:  09/10/18 2054    Specimen:  Blood from Blood, Venous Line Updated:  09/10/18 2201    Narrative:       The following orders were created for panel order Extra Tubes.  Procedure                               Abnormality         Status                     ---------                               -----------         ------                     Gold Top - SST[[id number]]                                   Final result                 Please view results for these tests on the individual orders.    Gold Top - SST [[id number]] Collected:  09/10/18 2054    Specimen:  Blood Updated:  09/10/18 2201     Extra Tube Hold for add-ons.     Comment: Auto resulted.       Troponin [[id number]]  (Normal) Collected:  09/10/18 2049    Specimen:  Blood Updated:  09/10/18 2131     Troponin I 0.013 ng/mL     TSH [[id number]]  (Normal) Collected:  09/10/18 1521    Specimen:  Blood Updated:  09/10/18 2043     TSH 1.940 mIU/mL     Hemoglobin A1c [[id number]] Collected:  09/10/18 1521    Specimen:  Blood Updated:  09/10/18 2034    T4, Free [[id number]]  (Normal) Collected:   09/10/18 1521    Specimen:  Blood Updated:  09/10/18 2029     Free T4 1.04 ng/dL     Magnesium [[id number]]  (Normal) Collected:  09/10/18 1521    Specimen:  Blood Updated:  09/10/18 2012     Magnesium 1.9 mg/dL     POC Glucose Once [[id number]]  (Normal) Collected:  09/10/18 1931    Specimen:  Blood Updated:  09/10/18 1948     Glucose 122 mg/dL      Comment: Result Not ConfirmedOperator: [id number] [facility] ID: [id number]       Lipid Panel [[id number]]  (Abnormal) Collected:  09/10/18 1521    Specimen:  Blood Updated:  09/10/18 1945     Total Cholesterol 148 mg/dL      Triglycerides 352 (H) mg/dL      HDL Cholesterol 24 (L) mg/dL      LDL Cholesterol  75 mg/dL      LDL/HDL Ratio 2.23    Urinalysis, Microscopic Only - Urine, Clean Catch [[id number]]  (Abnormal) Collected:  09/10/18 1714    Specimen:  Urine from Urine, Clean Catch Updated:  09/10/18 1753     RBC, UA 3-5 (A) /HPF      WBC, UA 3-5 /HPF      Bacteria, UA 4+ (A) /HPF      Squamous Epithelial Cells, UA 3-5 (A) /HPF      Hyaline Casts, UA None Seen /LPF      Methodology Automated Microscopy    Urinalysis With Microscopic If Indicated (No Culture) - Urine, Clean Catch [[id number]]  (Abnormal) Collected:  09/10/18 1714    Specimen:  Urine from Urine, Clean Catch Updated:  09/10/18 1725     Color, UA Yellow     Appearance, UA Clear     pH, UA 6.5     Specific [place], UA 1.024     Comment: Result obtained by Refractometer        Glucose, UA Negative     Ketones, UA Negative     Bilirubin, UA Negative     Blood, UA Moderate (2+) (A)     Protein, UA 30 mg/dL (1+) (A)     Leuk Esterase, UA Trace (A)     Nitrite, UA Negative     Urobilinogen, UA 0.2 E.U./dL    [facility] Draw [[id number]] Collected:  09/10/18 1521    Specimen:  Blood Updated:  09/10/18 1632    Narrative:       The following orders were created for panel order [place] Draw.  Procedure                               Abnormality         Status                     ---------                                -----------         ------                     Light Blue Top[[id number]]                                   Final result               Green Top (Gel)[[id number]]                                  Final result               Lavender Top[[id number]]                                     Final result               Gold Top - SST[[id number]]                                   Final result                 Please view results for these tests on the individual orders.    Light Blue Top [[id number]] Collected:  09/10/18 1521    Specimen:  Blood Updated:  09/10/18 1632     Extra Tube hold for add-on     Comment: Auto resulted       Green Top (Gel) [[id number]] Collected:  09/10/18 1521    Specimen:  Blood Updated:  09/10/18 1632     Extra Tube Hold for add-ons.     Comment: Auto resulted.       Lavender Top [[id number]] Collected:  09/10/18 1521    Specimen:  Blood Updated:  09/10/18 1632     Extra Tube hold for add-on     Comment: Auto resulted       Gold Top - SST [[id number]] Collected:  09/10/18 1521    Specimen:  Blood Updated:  09/10/18 1632     Extra Tube Hold for add-ons.     Comment: Auto resulted.       BNP [[id number]]  (Abnormal) Collected:  09/10/18 1521    Specimen:  Blood Updated:  09/10/18 1602     proBNP 1,370.0 (H) pg/mL     Troponin [[id number]]  (Normal) Collected:  09/10/18 1521    Specimen:  Blood Updated:  09/10/18 1602     Troponin I <0.012 ng/mL     Comprehensive Metabolic Panel [[id number]]  (Abnormal) Collected:  09/10/18 1521    Specimen:  Blood Updated:  09/10/18 1551     Glucose 129 (H) mg/dL      BUN 16 mg/dL      Creatinine 0.95 mg/dL      Sodium 142 mmol/L      Potassium 4.1 mmol/L      Chloride 105 mmol/L      CO2 27.0 mmol/L      Calcium 9.1 mg/dL      Total Protein 7.1 g/dL      Albumin 3.80 g/dL      ALT (SGPT) 34 U/L      AST (SGOT) 28 U/L      Alkaline Phosphatase 56 U/L      Total Bilirubin 0.5 mg/dL      eGFR Non African Amer 58 mL/min/1.73      Globulin 3.3 gm/dL      A/G Ratio 1.2 g/dL       BUN/Creatinine Ratio 16.8     Anion Gap 10.0 mmol/L     Narrative:       The MDRD GFR formula is only valid for adults with stable renal function between ages 18 and 70.    CBC & Differential [[id number]] Collected:  09/10/18 1521    Specimen:  Blood Updated:  09/10/18 1530    Narrative:       The following orders were created for panel order CBC & Differential.  Procedure                               Abnormality         Status                     ---------                               -----------         ------                     CBC Auto Differential[[id number]]        Normal              Final result                 Please view results for these tests on the individual orders.    CBC Auto Differential [[id number]]  (Normal) Collected:  09/10/18 1521    Specimen:  Blood Updated:  09/10/18 1530     WBC 6.96 10*3/mm3      RBC 4.57 10*6/mm3      Hemoglobin 13.7 g/dL      Hematocrit 39.5 %      MCV 86.4 fL      MCH 30.0 pg      MCHC 34.7 g/dL      RDW 12.8 %      RDW-SD 40.2 fl      MPV 8.7 fL      Platelets 251 10*3/mm3      Neutrophil % 64.3 %      Lymphocyte % 24.9 %      Monocyte % 7.5 %      Eosinophil % 2.9 %      Basophil % 0.3 %      Immature Grans % 0.1 %      Neutrophils, Absolute 4.48 10*3/mm3      Lymphocytes, Absolute 1.73 10*3/mm3      Monocytes, Absolute 0.52 10*3/mm3      Eosinophils, Absolute 0.20 10*3/mm3      Basophils, Absolute 0.02 10*3/mm3      Immature Grans, Absolute 0.01 10*3/mm3            Imaging Results (last 24 hours)     Procedure Component Value Units Date/Time    XR Chest 1 View [[id number]] Collected:  09/10/18 1527     Updated:  09/10/18 1555    Narrative:         EXAM:         Radiograph(s), Chest   VIEWS:   Frontal  ; 1       DATE/TIME:  9/10/2018 3:42 PM CDT                INDICATION:   chest discomfort, atrial fib    COMPARISON:  CXR: 1/9/17             FINDINGS:             - lines/tubes:    none     - cardiac:         size within normal limits         - mediastinum: contour  within normal limits         - lungs:         no focal air space process, pulmonary  interstitial edema, nodule(s)/mass             - pleura:         no evidence of  fluid                  - osseous:         unremarkable for age                  - misc.:         Impression:       CONCLUSION:        1. No evidence of an active cardiopulmonary process.                                                              Electronically signed by:  SUJATA Pruitt MD  9/10/2018 3:54  PM CDT Workstation: [id number]          I reviewed the patient's new clinical results.    Assessment/Plan:     Hospital Problem List     * (Principal)Atrial fibrillation with RVR (CMS/[facility])    Overview Addendum 9/11/2018 12:22 AM by Vashti Guzman MD     -Status post IV Lopressor 5mg in the ED  -Will continue IV Cardizem infusion as tolerated by BP; will consider amiodarone  -Will consult cardiology in the morning  -ECHO ordered to evaluate cardiac function  -Monitoring cardiac activity with telemetry  -CHADSVASc: 4 - continue anticoagulation with Xarelto  -Patient was on rhythm control at home: Rythmol TID  -TSH and Mg+2 WNL         Arthralgia of right hip    Overview Addendum 9/11/2018 12:09 AM by Vashti Guzman MD     -Continue Percocet 10mg PO PRN  -[facility] report #[id number] obtained; consistent with patient's prescribed medications         Type 2 diabetes mellitus without complication, without long-term current use of insulin (CMS/[facility])    Overview Signed 9/10/2018  8:23 PM by Vashti Guzman MD     -Last HbA1c was 6.5% approximately two years ago  -Orders placed for new HbA1c; will recommend that patient follow up with PCP to discuss risks and benefits of continued metformin  -Will hold metformin during IP course  -ADA diet recommended; will consult nutrition/dietary  -SSI provided with regular fingersticks to monitor glucose         Class 3 obesity due to excess calories with serious comorbidity and body mass index (BMI) of  45.0 to 49.9 in adult (CMS/[facility])    Overview Signed 9/11/2018 12:11 AM by Vashti Guzman MD     –Encourage lifestyle modifications such as portion control and regular physical activity  –We'll consult nutrition/dietary to educate patient         Polypharmacy            DVT prophylaxis: Xarelto  Code Status and Medical Interventions:   Ordered at: 09/10/18 2005     Level Of Support Discussed With:    Patient     Code Status:    CPR     Medical Interventions (Level of Support Prior to Arrest):    Full       Plan for disposition:      Time: 15 mintues      This document has been electronically signed by Danya Chamorro on September 11, 2018 7:08 AM     0

## 2020-01-15 DIAGNOSIS — Z12.31 ENCOUNTER FOR SCREENING MAMMOGRAM FOR BREAST CANCER: ICD-10-CM

## 2020-03-03 DIAGNOSIS — I48.91 ATRIAL FIBRILLATION, UNSPECIFIED TYPE (HCC): ICD-10-CM

## 2020-08-11 DIAGNOSIS — E11.9 TYPE 2 DIABETES MELLITUS WITHOUT COMPLICATION, WITHOUT LONG-TERM CURRENT USE OF INSULIN (HCC): ICD-10-CM

## 2020-08-11 DIAGNOSIS — I10 ESSENTIAL HYPERTENSION: ICD-10-CM

## 2020-08-11 DIAGNOSIS — E78.5 HYPERLIPIDEMIA, UNSPECIFIED HYPERLIPIDEMIA TYPE: ICD-10-CM

## 2020-08-11 DIAGNOSIS — I48.91 ATRIAL FIBRILLATION, UNSPECIFIED TYPE (HCC): ICD-10-CM

## 2020-08-11 DIAGNOSIS — F32.A DEPRESSION, UNSPECIFIED DEPRESSION TYPE: ICD-10-CM

## 2020-08-11 RX ORDER — SERTRALINE HYDROCHLORIDE 100 MG/1
100 TABLET, FILM COATED ORAL DAILY
Qty: 135 TABLET | Refills: 1 | Status: SHIPPED | OUTPATIENT
Start: 2020-08-11 | End: 2020-08-12

## 2020-08-11 RX ORDER — ATORVASTATIN CALCIUM 40 MG/1
40 TABLET, FILM COATED ORAL NIGHTLY
Qty: 90 TABLET | Refills: 1 | Status: SHIPPED | OUTPATIENT
Start: 2020-08-11 | End: 2021-02-16 | Stop reason: SDUPTHER

## 2020-08-11 RX ORDER — FENOFIBRATE 54 MG/1
54 TABLET ORAL DAILY
Qty: 90 TABLET | Refills: 1 | Status: SHIPPED | OUTPATIENT
Start: 2020-08-11 | End: 2021-02-16 | Stop reason: SDUPTHER

## 2020-08-11 RX ORDER — LISINOPRIL 20 MG/1
20 TABLET ORAL DAILY
Qty: 90 TABLET | Refills: 1 | Status: SHIPPED | OUTPATIENT
Start: 2020-08-11 | End: 2021-02-16 | Stop reason: SDUPTHER

## 2020-08-11 RX ORDER — FUROSEMIDE 20 MG/1
20 TABLET ORAL DAILY
Qty: 90 TABLET | Refills: 1 | Status: SHIPPED | OUTPATIENT
Start: 2020-08-11 | End: 2021-03-18 | Stop reason: SDUPTHER

## 2020-08-12 RX ORDER — SERTRALINE HYDROCHLORIDE 100 MG/1
TABLET, FILM COATED ORAL
Qty: 135 TABLET | Refills: 1 | Status: SHIPPED | OUTPATIENT
Start: 2020-08-12 | End: 2020-12-28

## 2020-12-26 DIAGNOSIS — F32.A DEPRESSION, UNSPECIFIED DEPRESSION TYPE: ICD-10-CM

## 2020-12-28 RX ORDER — SERTRALINE HYDROCHLORIDE 100 MG/1
TABLET, FILM COATED ORAL
Qty: 135 TABLET | Refills: 0 | Status: SHIPPED | OUTPATIENT
Start: 2020-12-28 | End: 2021-03-18 | Stop reason: SDUPTHER

## 2021-01-04 ENCOUNTER — HOSPITAL ENCOUNTER (EMERGENCY)
Facility: HOSPITAL | Age: 76
Discharge: LEFT BEFORE DISCHARGE | End: 2021-01-04

## 2021-01-04 PROCEDURE — 93005 ELECTROCARDIOGRAM TRACING: CPT

## 2021-01-04 PROCEDURE — 93010 ELECTROCARDIOGRAM REPORT: CPT | Performed by: INTERNAL MEDICINE

## 2021-01-04 PROCEDURE — 99211 OFF/OP EST MAY X REQ PHY/QHP: CPT

## 2021-01-04 RX ORDER — SODIUM CHLORIDE 0.9 % (FLUSH) 0.9 %
10 SYRINGE (ML) INJECTION AS NEEDED
Status: DISCONTINUED | OUTPATIENT
Start: 2021-01-04 | End: 2021-01-04 | Stop reason: HOSPADM

## 2021-01-05 LAB
QT INTERVAL: 408 MS
QTC INTERVAL: 488 MS

## 2021-02-14 DIAGNOSIS — I10 ESSENTIAL HYPERTENSION: ICD-10-CM

## 2021-02-14 DIAGNOSIS — E11.9 TYPE 2 DIABETES MELLITUS WITHOUT COMPLICATION, WITHOUT LONG-TERM CURRENT USE OF INSULIN (HCC): ICD-10-CM

## 2021-02-14 DIAGNOSIS — E78.5 HYPERLIPIDEMIA, UNSPECIFIED HYPERLIPIDEMIA TYPE: ICD-10-CM

## 2021-02-16 RX ORDER — ATORVASTATIN CALCIUM 40 MG/1
TABLET, FILM COATED ORAL
Qty: 90 TABLET | Refills: 3 | Status: SHIPPED | OUTPATIENT
Start: 2021-02-16 | End: 2021-03-17 | Stop reason: SDUPTHER

## 2021-02-16 RX ORDER — LISINOPRIL 20 MG/1
20 TABLET ORAL DAILY
Qty: 90 TABLET | Refills: 3 | Status: SHIPPED | OUTPATIENT
Start: 2021-02-16 | End: 2021-03-17 | Stop reason: SDUPTHER

## 2021-02-16 RX ORDER — FENOFIBRATE 54 MG/1
54 TABLET ORAL DAILY
Qty: 90 TABLET | Refills: 3 | Status: SHIPPED | OUTPATIENT
Start: 2021-02-16 | End: 2021-03-17 | Stop reason: SDUPTHER

## 2021-03-17 ENCOUNTER — TELEPHONE (OUTPATIENT)
Dept: FAMILY MEDICINE CLINIC | Facility: CLINIC | Age: 76
End: 2021-03-17

## 2021-03-17 DIAGNOSIS — F32.A DEPRESSION, UNSPECIFIED DEPRESSION TYPE: ICD-10-CM

## 2021-03-17 PROBLEM — I48.0 PAROXYSMAL ATRIAL FIBRILLATION: Status: ACTIVE | Noted: 2017-05-18

## 2021-03-17 PROBLEM — G89.29 CHRONIC PAIN: Status: ACTIVE | Noted: 2017-05-18

## 2021-03-18 ENCOUNTER — OFFICE VISIT (OUTPATIENT)
Dept: FAMILY MEDICINE CLINIC | Facility: CLINIC | Age: 76
End: 2021-03-18

## 2021-03-18 VITALS
WEIGHT: 274.8 LBS | BODY MASS INDEX: 46.92 KG/M2 | TEMPERATURE: 98.2 F | HEIGHT: 64 IN | HEART RATE: 86 BPM | DIASTOLIC BLOOD PRESSURE: 82 MMHG | SYSTOLIC BLOOD PRESSURE: 146 MMHG | RESPIRATION RATE: 24 BRPM | OXYGEN SATURATION: 96 %

## 2021-03-18 DIAGNOSIS — Z76.89 ENCOUNTER TO ESTABLISH CARE: Primary | ICD-10-CM

## 2021-03-18 DIAGNOSIS — E78.5 HYPERLIPIDEMIA, UNSPECIFIED HYPERLIPIDEMIA TYPE: ICD-10-CM

## 2021-03-18 DIAGNOSIS — E11.9 TYPE 2 DIABETES MELLITUS WITHOUT COMPLICATION, WITHOUT LONG-TERM CURRENT USE OF INSULIN (HCC): ICD-10-CM

## 2021-03-18 DIAGNOSIS — I10 ESSENTIAL HYPERTENSION: ICD-10-CM

## 2021-03-18 DIAGNOSIS — F32.A DEPRESSION, UNSPECIFIED DEPRESSION TYPE: ICD-10-CM

## 2021-03-18 DIAGNOSIS — E55.9 VITAMIN D DEFICIENCY: ICD-10-CM

## 2021-03-18 DIAGNOSIS — Z13.29 THYROID DISORDER SCREEN: ICD-10-CM

## 2021-03-18 DIAGNOSIS — I48.91 ATRIAL FIBRILLATION, UNSPECIFIED TYPE (HCC): ICD-10-CM

## 2021-03-18 DIAGNOSIS — G47.30 SLEEP APNEA, UNSPECIFIED TYPE: ICD-10-CM

## 2021-03-18 PROCEDURE — 99214 OFFICE O/P EST MOD 30 MIN: CPT | Performed by: NURSE PRACTITIONER

## 2021-03-18 RX ORDER — FENOFIBRATE 54 MG/1
54 TABLET ORAL DAILY
Qty: 90 TABLET | Refills: 3 | Status: SHIPPED | OUTPATIENT
Start: 2021-03-18 | End: 2021-04-02 | Stop reason: SDUPTHER

## 2021-03-18 RX ORDER — LISINOPRIL 20 MG/1
20 TABLET ORAL DAILY
COMMUNITY
End: 2021-03-18 | Stop reason: SDUPTHER

## 2021-03-18 RX ORDER — FUROSEMIDE 20 MG/1
20 TABLET ORAL DAILY
Qty: 90 TABLET | Refills: 1 | Status: SHIPPED | OUTPATIENT
Start: 2021-03-18 | End: 2023-01-04

## 2021-03-18 RX ORDER — SERTRALINE HYDROCHLORIDE 100 MG/1
TABLET, FILM COATED ORAL
Qty: 135 TABLET | Refills: 3 | Status: SHIPPED | OUTPATIENT
Start: 2021-03-18 | End: 2021-04-02 | Stop reason: SDUPTHER

## 2021-03-18 RX ORDER — LISINOPRIL 20 MG/1
20 TABLET ORAL DAILY
Qty: 90 TABLET | Refills: 3 | Status: SHIPPED | OUTPATIENT
Start: 2021-03-18 | End: 2021-04-02 | Stop reason: SDUPTHER

## 2021-03-18 RX ORDER — ATORVASTATIN CALCIUM 40 MG/1
40 TABLET, FILM COATED ORAL DAILY
COMMUNITY
End: 2021-03-18 | Stop reason: SDUPTHER

## 2021-03-18 RX ORDER — FENOFIBRATE 54 MG/1
54 TABLET ORAL DAILY
COMMUNITY
End: 2021-03-18 | Stop reason: SDUPTHER

## 2021-03-18 RX ORDER — METOPROLOL TARTRATE 50 MG/1
50 TABLET, FILM COATED ORAL 2 TIMES DAILY
Qty: 180 TABLET | Refills: 3 | Status: SHIPPED | OUTPATIENT
Start: 2021-03-18 | End: 2022-02-20 | Stop reason: SDUPTHER

## 2021-03-18 RX ORDER — ATORVASTATIN CALCIUM 40 MG/1
40 TABLET, FILM COATED ORAL DAILY
Qty: 90 TABLET | Refills: 3 | Status: SHIPPED | OUTPATIENT
Start: 2021-03-18 | End: 2021-04-02 | Stop reason: SDUPTHER

## 2021-03-19 RX ORDER — SERTRALINE HYDROCHLORIDE 100 MG/1
TABLET, FILM COATED ORAL
Qty: 135 TABLET | Refills: 3 | Status: SHIPPED | OUTPATIENT
Start: 2021-03-19 | End: 2022-01-31

## 2021-03-24 DIAGNOSIS — G47.30 SLEEP APNEA, UNSPECIFIED TYPE: Primary | ICD-10-CM

## 2021-04-12 ENCOUNTER — OFFICE VISIT (OUTPATIENT)
Dept: SLEEP MEDICINE | Facility: HOSPITAL | Age: 76
End: 2021-04-12

## 2021-04-12 VITALS
BODY MASS INDEX: 47.12 KG/M2 | HEART RATE: 73 BPM | WEIGHT: 276 LBS | HEIGHT: 64 IN | SYSTOLIC BLOOD PRESSURE: 143 MMHG | OXYGEN SATURATION: 94 % | DIASTOLIC BLOOD PRESSURE: 77 MMHG

## 2021-04-12 DIAGNOSIS — G47.33 OBSTRUCTIVE SLEEP APNEA, ADULT: Primary | ICD-10-CM

## 2021-04-12 PROCEDURE — 99214 OFFICE O/P EST MOD 30 MIN: CPT | Performed by: NURSE PRACTITIONER

## 2021-04-12 NOTE — PROGRESS NOTES
New Patient Sleep Medicine Consultation    Encounter Date: 2021         Patient's Primary Care Provider: Lidia Sahni APRN  Referring Provider: Lidia Sahni APRN  Reason for consultation/chief complaint: known JEANNETTE on CPAP, [facility] care    Juliana Alcazar is a 75 y.o. female who admits to High blod pressure and no other symptoms while using CPAP.    She denies cataplexy, sleep paralysis, or hypnagogic hallucinations. Her bedtime is ~ 2300. She  falls asleep after 10 minutes, and is up 0-1 times per night. She wakes up ~ 2759-9173. She endorses 7-8 hours of sleep. She drinks 1 cups of coffee, 0 teas, and 0-1 sodas per day. She drinks 0 alcoholic beverages per week. She is not a current smoker. She does not take sedatives or hypnotics. She has no sleepiness with driving. She rarely naps if bored. She has been using CPAP since  and currently uses a nasal cushion for a mask.    Patient reports machine malfunction and is requesting a new machine. Machine occasionally shuts off or does not deliver enough pressure.    Of note, patient has a significant cardiac history of paroxysmal atrial fibrillation, S/P multiple ablations and hospitalizations, for which she recently underwent pacemaker placement at [facility] in [place] on 2021.    [place] - 3    Prior Sleep Testin. Split night PSG on 2017, AHI of 54.5   2. CPAP titration on same day, recommended 14 cm H2O   3. Currently on 14 cm H2O    Past Medical History:   Diagnosis Date   • Acute sinusitis    • Anorectal fistula     Anorectal fistula - status post repair      • Atrial fibrillation (CMS/HCC)    • Backache    • Carpal tunnel syndrome    • Chest pain, non-cardiac    • Degenerative joint disease involving multiple joints    • Depressive disorder    • Diarrhea    • Dyspnea    • Electrocardiogram abnormal    • Essential hypertension    • Female stress incontinence    • Generalized anxiety disorder    • GERD (gastroesophageal  reflux disease)    • H/O tubal ligation    • Hemorrhoids    • Hypercholesterolemia    • Hyperlipidemia    • Hypertensive disorder    • Low back pain     C/O - low back pain      • Normal gynecologic examination    • Obesity    • Otitis media, left    • Palpitations     a   • Primary fibromyalgia syndrome    • Sleep apnea    • Tachycardia    • Type 2 diabetes mellitus (CMS/HCC)      Social History     Socioeconomic History   • Marital status:      Spouse name: Not on file   • Number of children: Not on file   • Years of education: Not on file   • Highest education level: Not on file   Tobacco Use   • Smoking status: Never Smoker   • Smokeless tobacco: Never Used   Substance and Sexual Activity   • Alcohol use: No   • Drug use: No   • Sexual activity: Not Currently     Partners: Male     Family History   Problem Relation Age of Onset   • Hyperlipidemia Mother    • Hypertension Mother    • Lung cancer Mother    • Hyperlipidemia Father    • Hypertension Father    • Lung cancer Sister    • Gallbladder disease Maternal Grandmother    • Heart disease Paternal Grandmother    • Diabetes Paternal Grandfather      Prior T&A, UPPP, maxillofacial, or bariatric surgery: Tonsillectomy  Family history of sleep disorders:  - JEANNETTE on CPAP; son - snoring  Other family history + for: As above  Occupation: Retired   Marital status:   Children: 2  Has 0 brothers and 2 sisters  Smoking history: smoked never    Review of Systems:  Constitutional: negative  Eyes: negative  Ears, nose, mouth, throat, and face: negative  Respiratory: negative  Cardiovascular: negative  Gastrointestinal: negative  Genitourinary:negative  Integument/breast: negative  Hematologic/lymphatic: negative  Musculoskeletal:positive for arthralgias, stiff joints and walks with cane for left hip and knee pain  Neurological: negative  Behavioral/Psych: negative  Endocrine: negative  Allergic/Immunologic: negative   Patient advised to  "discuss any positive ROS with PCP.      Vitals:    04/12/21 0933   BP: 143/77   Pulse: 73   SpO2: 94%           04/12/21 0933   Weight: 125 kg (276 lb)       Body mass index is 47.35 kg/m². Patient's Body mass index is 47.35 kg/m². BMI is above normal parameters. Recommendations include: referral to primary care.      Physical Exam:        General: Alert. Cooperative. Well developed. No acute distress.   Head/Neck:  Normocephalic. Symmetrical. Atraumatic.     Neck circumference: 15\"             Eyes: Sclera clear. No icterus. PERRLA. Normal EOM.             Ears: No deformities. Normal hearing.             Nose: No septal deviation. No drainage.          Throat: No oral lesions. No thrush. Moist mucous membranes. Trachea midline    Tongue is normal     Dentition is fair       Pharynx: Posterior pharyngeal pillars are narrow    Mallampati score of III (soft and hard palate and base of uvula visible)    Pharynx is normal with unrermarkable tonsils   Chest Wall:  Normal shape. Symmetric expansion with respiration. No tenderness.          Lungs:  Clear to auscultation bilaterally. No wheezes. No rhonchi. No rales. Respirations regular, even and unlabored.            Heart:  Regular rhythm and normal rate. Normal S1 and S2. No murmur.     Abdomen:  Soft, non-tender and non-distended. Normal bowel sounds. No masses.  Extremities:  Moves all extremities well. No edema.           Pulses: Pulses palpable and equal bilaterally.               Skin: Dry. Intact. No bleeding. No rash.           Neuro: Moves all 4 extremities and cranial nerves grossly intact.  Psychiatric: Normal mood and affect.      Current Outpatient Medications:   •  furosemide (LASIX) 20 MG tablet, Take 1 tablet by mouth Daily., Disp: 90 tablet, Rfl: 1  •  metoprolol tartrate (LOPRESSOR) 50 MG tablet, Take 1 tablet by mouth 2 (Two) Times a Day., Disp: 180 tablet, Rfl: 3  •  oxyCODONE-acetaminophen (PERCOCET)  MG per tablet, Take 1 tablet by mouth " Every 6 (Six) Hours As Needed for Moderate Pain ., Disp: , Rfl:   •  rivaroxaban (Xarelto) 20 MG tablet, Take 1 tablet by mouth Daily., Disp: 90 tablet, Rfl: 3  •  sertraline (ZOLOFT) 100 MG tablet, TAKE 1 TABLET BY MOUTH IN  THE MORNING AND 1/2 TABLET  IN THE EVENING, Disp: 135 tablet, Rfl: 3    WBC   Date Value Ref Range Status   11/15/2018 6.42 3.20 - 9.80 10*3/mm3 Final     RBC   Date Value Ref Range Status   11/15/2018 4.24 3.77 - 5.16 10*6/mm3 Final     Hemoglobin   Date Value Ref Range Status   11/15/2018 12.8 12.0 - 15.5 g/dL Final     Hematocrit   Date Value Ref Range Status   11/15/2018 38.7 35.0 - 45.0 % Final     MCV   Date Value Ref Range Status   11/15/2018 91.3 80.0 - 98.0 fL Final     MCH   Date Value Ref Range Status   11/15/2018 30.2 26.5 - 34.0 pg Final     MCHC   Date Value Ref Range Status   11/15/2018 33.1 31.4 - 36.0 g/dL Final     RDW   Date Value Ref Range Status   11/15/2018 13.2 11.5 - 14.5 % Final     RDW-SD   Date Value Ref Range Status   11/15/2018 44.0 36.4 - 46.3 fl Final     MPV   Date Value Ref Range Status   11/15/2018 8.1 8.0 - 12.0 fL Final     Platelets   Date Value Ref Range Status   11/15/2018 263 150 - 450 10*3/mm3 Final     Neutrophil %   Date Value Ref Range Status   11/15/2018 48.2 37.0 - 80.0 % Final     Lymphocyte %   Date Value Ref Range Status   11/15/2018 38.6 10.0 - 50.0 % Final     Monocyte %   Date Value Ref Range Status   11/15/2018 9.0 0.0 - 12.0 % Final     Eosinophil %   Date Value Ref Range Status   11/15/2018 3.4 0.0 - 7.0 % Final     Basophil %   Date Value Ref Range Status   11/15/2018 0.3 0.0 - 2.0 % Final     Immature Grans %   Date Value Ref Range Status   11/15/2018 0.5 0.0 - 0.5 % Final     Neutrophils, Absolute   Date Value Ref Range Status   11/15/2018 3.09 2.00 - 8.60 10*3/mm3 Final     Lymphocytes, Absolute   Date Value Ref Range Status   11/15/2018 2.48 0.60 - 4.20 10*3/mm3 Final     Monocytes, Absolute   Date Value Ref Range Status   11/15/2018  0.58 0.00 - 0.90 10*3/mm3 Final     Eosinophils, Absolute   Date Value Ref Range Status   11/15/2018 0.22 0.00 - 0.70 10*3/mm3 Final     Basophils, Absolute   Date Value Ref Range Status   11/15/2018 0.02 0.00 - 0.20 10*3/mm3 Final     Immature Grans, Absolute   Date Value Ref Range Status   11/15/2018 0.03 (H) 0.00 - 0.02 10*3/mm3 Final     nRBC   Date Value Ref Range Status   11/15/2018 0.0 0.0 - 0.0 /100 WBC Final     Lab Results   Component Value Date    GLUCOSE 127 (H) 10/25/2019    BUN 18 10/25/2019    CREATININE 1.01 (H) 10/25/2019    EGFRIFNONA 54 (L) 10/25/2019    BCR 17.8 10/25/2019    K 4.9 10/25/2019    CO2 27.8 10/25/2019    CALCIUM 9.7 10/25/2019    ALBUMIN 4.00 10/25/2019    AST 25 10/25/2019    ALT 27 10/25/2019       Contraindications to home sleep test: Permanent pacemaker    ASSESSMENT:  1. Obstructive sleep apnea - New (to me), no additional work-up planned (3)  1. Script for CPAP @ 14 cm H2O and supplies  2. Follow up within 31-90 days with compliance check, or sooner if changes in symptoms      I spent 30 minutes caring for Juliana on this date of service. This time includes time spent by me in the following activities: preparing for the visit, reviewing tests, obtaining and/or reviewing a separately obtained history, performing a medically appropriate examination and/or evaluation , counseling and educating the patient/family/caregiver and documenting information in the medical record; discussing PAP therapy, PAP compliance and PAP maintenance    Patient to follow up in 31-90 days with compliance report. Patient agrees to return sooner if changes in symptoms.           This document has been electronically signed by KAVITHA Lilly on April 12, 2021 09:50 CDT          CC: Lidia Sahni APRN Brown, Sandra W, APRN

## 2021-04-21 ENCOUNTER — TELEPHONE (OUTPATIENT)
Dept: FAMILY MEDICINE CLINIC | Facility: CLINIC | Age: 76
End: 2021-04-21

## 2021-04-22 ENCOUNTER — OFFICE VISIT (OUTPATIENT)
Dept: FAMILY MEDICINE CLINIC | Facility: CLINIC | Age: 76
End: 2021-04-22

## 2021-04-22 VITALS
BODY MASS INDEX: 48.14 KG/M2 | WEIGHT: 282 LBS | HEIGHT: 64 IN | TEMPERATURE: 97.5 F | RESPIRATION RATE: 20 BRPM | SYSTOLIC BLOOD PRESSURE: 138 MMHG | OXYGEN SATURATION: 100 % | HEART RATE: 72 BPM | DIASTOLIC BLOOD PRESSURE: 78 MMHG

## 2021-04-22 DIAGNOSIS — Z78.0 POST-MENOPAUSE: ICD-10-CM

## 2021-04-22 DIAGNOSIS — Z13.820 SCREENING FOR OSTEOPOROSIS: ICD-10-CM

## 2021-04-22 DIAGNOSIS — Z00.00 MEDICARE ANNUAL WELLNESS VISIT, INITIAL: Primary | ICD-10-CM

## 2021-04-22 PROCEDURE — G0438 PPPS, INITIAL VISIT: HCPCS | Performed by: NURSE PRACTITIONER

## 2021-04-22 NOTE — PROGRESS NOTES
The ABCs of the Annual Wellness Visit  Initial Medicare Wellness Visit    Chief Complaint   Patient presents with   • Annual Exam     Medicare Wellness Exam       Subjective   History of Present Illness:  Juliana Alcazar is a 75 y.o. female who presents for a Initial Medicare Wellness Visit.    HEALTH RISK ASSESSMENT    Recent Hospitalizations:  Recently treated at the following:  Other: [facility]    Current Medical Providers:  Patient Care Team:  Lidia Sahni APRN as PCP - General (Family Medicine)  SUJATA Nielsen MD as Consulting Physician (Cardiac Electrophysiology)    Smoking Status:  Social History     Tobacco Use   Smoking Status Never Smoker   Smokeless Tobacco Never Used       Alcohol Consumption:  Social History     Substance and Sexual Activity   Alcohol Use No       Depression Screen:   PHQ-2/PHQ-9 Depression Screening 3/18/2021   Little interest or pleasure in doing things 0   Feeling down, depressed, or hopeless 0   Total Score 0       Fall Risk Screen:  KATHRYN Fall Risk Assessment was completed, and patient is at LOW risk for falls.Assessment completed on:4/22/2021    Health Habits and Functional and Cognitive Screening:  Functional & Cognitive Status 4/22/2021   Do you have difficulty preparing food and eating? No   Do you have difficulty bathing yourself, getting dressed or grooming yourself? No   Do you have difficulty using the toilet? No   Do you have difficulty moving around from place to place? Yes   Do you have trouble with steps or getting out of a bed or a chair? Yes   Current Diet Diabetic Diet   Dental Exam Up to date   Eye Exam Not up to date   Exercise (times per week) 0 times per week   Current Exercise Activities Include None   Do you need help using the phone?  No   Are you deaf or do you have serious difficulty hearing?  No   Do you need help with transportation? Yes   Do you need help shopping? No   Do you need help preparing meals?  No   Do you need help with housework?  Yes    Do you need help with laundry? No   Do you need help taking your medications? No   Do you need help managing money? No   Do you ever drive or ride in a car without wearing a seat belt? No   Have you felt unusual stress, anger or loneliness in the last month? No   Who do you live with? Spouse   If you need help, do you have trouble finding someone available to you? No   Have you been bothered in the last four weeks by sexual problems? No   Do you have difficulty concentrating, remembering or making decisions? Yes         Does the patient have evidence of cognitive impairment? No    Asprin use counseling:Taking ASA appropriately as indicated    Age-appropriate Screening Schedule:  Refer to the list below for future screening recommendations based on patient's age, sex and/or medical conditions. Orders for these recommended tests are listed in the plan section. The patient has been provided with a written plan.    Health Maintenance   Topic Date Due   • DXA SCAN  Never done   • HEMOGLOBIN A1C  04/25/2020   • DIABETIC FOOT EXAM  10/25/2020   • LIPID PANEL  10/25/2020   • URINE MICROALBUMIN  10/25/2020   • INFLUENZA VACCINE  08/01/2021   • DIABETIC EYE EXAM  08/31/2021   • MAMMOGRAM  01/08/2022   • TDAP/TD VACCINES (2 - Td) 01/30/2027   • COLONOSCOPY  03/31/2027   • ZOSTER VACCINE  Discontinued          The following portions of the patient's history were reviewed and updated as appropriate: allergies, current medications, past family history, past medical history, past social history, past surgical history and problem list.    Outpatient Medications Prior to Visit   Medication Sig Dispense Refill   • furosemide (LASIX) 20 MG tablet Take 1 tablet by mouth Daily. 90 tablet 1   • metoprolol tartrate (LOPRESSOR) 50 MG tablet Take 1 tablet by mouth 2 (Two) Times a Day. 180 tablet 3   • oxyCODONE-acetaminophen (PERCOCET)  MG per tablet Take 1 tablet by mouth Every 6 (Six) Hours As Needed for Moderate Pain .     •  "rivaroxaban (Xarelto) 20 MG tablet Take 1 tablet by mouth Daily. 90 tablet 3   • sertraline (ZOLOFT) 100 MG tablet TAKE 1 TABLET BY MOUTH IN  THE MORNING AND 1/2 TABLET  IN THE EVENING 135 tablet 3     No facility-administered medications prior to visit.       Patient Active Problem List   Diagnosis   • Arthralgia of right hip   • Thoracic back pain   • Depression   • Gastroesophageal reflux disease without esophagitis   • Type 2 diabetes mellitus without complication, without long-term current use of insulin (CMS/[facility])   • Essential hypertension   • Hyperlipidemia   • Chronic left-sided low back pain without sciatica   • BMI 45.0-49.9, adult (CMS/[facility])   • Polypharmacy   • Chest pain   • Chronic pain   • Paroxysmal atrial fibrillation (CMS/[facility])       Advanced Care Planning:  ACP discussion was held with the patient during this visit. Patient does not have an advance directive, information provided.    Review of Systems    Compared to one year ago, the patient feels her physical health is better.  Compared to one year ago, the patient feels her mental health is better.    Reviewed chart for potential of high risk medication in the elderly: yes  Reviewed chart for potential of harmful drug interactions in the elderly:yes    Objective         Vitals:    04/22/21 0935   BP: 138/78   BP Location: Left arm   Patient Position: Sitting   Cuff Size: Adult   Pulse: 72   Resp: 20   Temp: 97.5 °F (36.4 °C)   TempSrc: Temporal   SpO2: 100%   Weight: 128 kg (282 lb)   Height: 162.6 cm (64\")   PainSc:   6   PainLoc: Neck       Body mass index is 48.41 kg/m².  Discussed the patient's BMI with her. The BMI is above average; BMI management plan is completed.    Physical Exam          Assessment/Plan   Medicare Risks and Personalized Health Plan  CMS Preventative Services Quick Reference  Advance Directive Discussion  Breast Cancer/Mammogram Screening  Cardiovascular risk  Colon Cancer Screening  Dementia/Memory "   Depression/Dysphoria  Diabetic Lab Screening   Fall Risk  Glaucoma Risk  Immunizations Discussed/Encouraged (specific immunizations; Immunizations were discussed and appear UTD )  Inadequate Social Support, Isolation, Loneliness, Lack of Transportation, Financial Difficulties, or Caregiver Stress   Inactivity/Sedentary  Obesity/Overweight   Osteoporosis Risk    The above risks/problems have been discussed with the patient.  Pertinent information has been shared with the patient in the After Visit Summary.  Follow up plans and orders are seen below in the Assessment/Plan Section.    Diagnoses and all orders for this visit:    1. Medicare annual wellness visit, initial (Primary)    2. Screening for osteoporosis  -     DEXA Bone Density Axial    3. Post-menopause  -     DEXA Bone Density Axial    Other orders  -     Cancel: DEXA Bone Density Axial      Follow Up:  Return in about 1 year (around 4/22/2022) for Medicare Wellness.     An After Visit Summary and PPPS were given to the patient.         This document has been electronically signed by KAVITHA Mixon on April 22, 2021 10:44 CDT

## 2021-04-22 NOTE — PATIENT INSTRUCTIONS
Preventive Care 65 Years and Older, Female  Preventive care refers to lifestyle choices and visits with your health care provider that can promote health and wellness. This includes:  · A yearly physical exam. This is also called an annual well check.  · Regular dental and eye exams.  · Immunizations.  · Screening for certain conditions.  · Healthy lifestyle choices, such as diet and exercise.  What can I expect for my preventive care visit?  Physical exam  Your health care provider will check:  · Height and weight. These may be used to calculate body mass index (BMI), which is a measurement that tells if you are at a healthy weight.  · Heart rate and blood pressure.  · Your skin for abnormal spots.  Counseling  Your health care provider may ask you questions about:  · Alcohol, tobacco, and drug use.  · Emotional well-being.  · Home and relationship well-being.  · Sexual activity.  · Eating habits.  · History of falls.  · Memory and ability to understand (cognition).  · Work and work environment.  · Pregnancy and menstrual history.  What immunizations do I need?    Influenza (flu) vaccine  · This is recommended every year.  Tetanus, diphtheria, and pertussis (Tdap) vaccine  · You may need a Td booster every 10 years.  Varicella (chickenpox) vaccine  · You may need this vaccine if you have not already been vaccinated.  Zoster (shingles) vaccine  · You may need this after age 60.  Pneumococcal conjugate (PCV13) vaccine  · One dose is recommended after age 65.  Pneumococcal polysaccharide (PPSV23) vaccine  · One dose is recommended after age 65.  Measles, mumps, and rubella (MMR) vaccine  · You may need at least one dose of MMR if you were born in 1957 or later. You may also need a second dose.  Meningococcal conjugate (MenACWY) vaccine  · You may need this if you have certain conditions.  Hepatitis A vaccine  · You may need this if you have certain conditions or if you travel or work in places where you may be exposed  to hepatitis A.  Hepatitis B vaccine  · You may need this if you have certain conditions or if you travel or work in places where you may be exposed to hepatitis B.  Haemophilus influenzae type b (Hib) vaccine  · You may need this if you have certain conditions.  You may receive vaccines as individual doses or as more than one vaccine together in one shot (combination vaccines). Talk with your health care provider about the risks and benefits of combination vaccines.  What tests do I need?  Blood tests  · Lipid and cholesterol levels. These may be checked every 5 years, or more frequently depending on your overall health.  · Hepatitis C test.  · Hepatitis B test.  Screening  · Lung cancer screening. You may have this screening every year starting at age 55 if you have a 30-pack-year history of smoking and currently smoke or have quit within the past 15 years.  · Colorectal cancer screening. All adults should have this screening starting at age 50 and continuing until age 75. Your health care provider may recommend screening at age 45 if you are at increased risk. You will have tests every 1-10 years, depending on your results and the type of screening test.  · Diabetes screening. This is done by checking your blood sugar (glucose) after you have not eaten for a while (fasting). You may have this done every 1-3 years.  · Mammogram. This may be done every 1-2 years. Talk with your health care provider about how often you should have regular mammograms.  · BRCA-related cancer screening. This may be done if you have a family history of breast, ovarian, tubal, or peritoneal cancers.  Other tests  · Sexually transmitted disease (STD) testing.  · Bone density scan. This is done to screen for osteoporosis. You may have this done starting at age 65.  Follow these instructions at home:  Eating and drinking  · Eat a diet that includes fresh fruits and vegetables, whole grains, lean protein, and low-fat dairy products. Limit  your intake of foods with high amounts of sugar, saturated fats, and salt.  · Take vitamin and mineral supplements as recommended by your health care provider.  · Do not drink alcohol if your health care provider tells you not to drink.  · If you drink alcohol:  ? Limit how much you have to 0-1 drink a day.  ? Be aware of how much alcohol is in your drink. In the [place] one drink equals one 12 oz bottle of beer (355 mL), one 5 oz glass of wine (148 mL), or one 1½ oz glass of hard liquor (44 mL).  Lifestyle  · Take daily care of your teeth and gums.  · Stay active. Exercise for at least 30 minutes on 5 or more days each week.  · Do not use any products that contain nicotine or tobacco, such as cigarettes, e-cigarettes, and chewing tobacco. If you need help quitting, ask your health care provider.  · If you are sexually active, practice safe sex. Use a condom or other form of protection in order to prevent STIs (sexually transmitted infections).  · Talk with your health care provider about taking a low-dose aspirin or statin.  What's next?  · Go to your health care provider once a year for a well check visit.  · Ask your health care provider how often you should have your eyes and teeth checked.  · Stay up to date on all vaccines.  This information is not intended to replace advice given to you by your health care provider. Make sure you discuss any questions you have with your health care provider.  Document Revised: 2019 Document Reviewed: 2019  [employer] Patient Education 2020 [employer]    Medicare Wellness  Personal Prevention Plan of Service     Date of Office Visit:  2021  Encounter Provider:  KAVITHA Mixon  Place of Service:  [facility]  Patient Name: Juliana Alcazar  :  1945    As part of the Medicare Wellness portion of your visit today, we are providing you with this personalized preventive plan of services (PPPS). This plan is based upon  recommendations of the [place] Preventive Services Task Force (USPSTF) and the Advisory Committee on Immunization Practices (ACIP).    This lists the preventive care services that should be considered, and provides dates of when you are due. Items listed as completed are up-to-date and do not require any further intervention.    Health Maintenance   Topic Date Due   • DXA SCAN  Never done   • HEMOGLOBIN A1C  04/25/2020   • DIABETIC FOOT EXAM  10/25/2020   • LIPID PANEL  10/25/2020   • URINE MICROALBUMIN  10/25/2020   • INFLUENZA VACCINE  08/01/2021   • DIABETIC EYE EXAM  08/31/2021   • MAMMOGRAM  01/08/2022   • ANNUAL WELLNESS VISIT  04/22/2022   • TDAP/TD VACCINES (2 - Td) 01/30/2027   • COLONOSCOPY  03/31/2027   • HEPATITIS C SCREENING  Completed   • COVID-19 Vaccine  Completed   • Pneumococcal Vaccine 65+  Completed   • ZOSTER VACCINE  Discontinued       No orders of the defined types were placed in this encounter.      Return in about 1 year (around 4/22/2022) for Medicare Wellness.

## 2021-07-02 ENCOUNTER — OFFICE VISIT (OUTPATIENT)
Dept: SLEEP MEDICINE | Facility: HOSPITAL | Age: 76
End: 2021-07-02

## 2021-07-02 VITALS
WEIGHT: 278 LBS | BODY MASS INDEX: 47.46 KG/M2 | DIASTOLIC BLOOD PRESSURE: 77 MMHG | OXYGEN SATURATION: 94 % | HEIGHT: 64 IN | SYSTOLIC BLOOD PRESSURE: 126 MMHG | HEART RATE: 83 BPM

## 2021-07-02 DIAGNOSIS — G47.33 OBSTRUCTIVE SLEEP APNEA, ADULT: Primary | ICD-10-CM

## 2021-07-02 DIAGNOSIS — E66.01 MORBID OBESITY (HCC): ICD-10-CM

## 2021-07-02 PROCEDURE — 99212 OFFICE O/P EST SF 10 MIN: CPT | Performed by: NURSE PRACTITIONER

## 2021-07-02 NOTE — PROGRESS NOTES
Sleep Clinic Follow Up    Date: 2021  Primary Care Provider: Lidia Sahni APRN    Last office visit: 2021 (I reviewed this note)    CC: Follow up: JEANNETTE on CPAP, new machine follow up      Interim History:  Since the last visit:    1) severe JEANNETTE -  Juliana Alcazar has remained compliant with CPAP. She denies mask and machine issues, dry mouth, headaches, or pressures intolerance. She denies abnormal dreams, sleep paralysis, nasal congestion, URI sx. She enjoys her new machine.    2) Patient denies RLS symptoms.     Sleep Testin. Split night PSG on 2017, AHI of 54.5   2. CPAP titration on same day, recommended 14 cm H2O   3. Currently on 14 cm H2O    PAP Data:    Time frame: 2021-2021  Compliance: 100%  Average use on days used: 5 hrs 52 min  Percent of days with usage greater than or equal to 4 hours: 80%  PAP range: 14 cm H2O  Average 90% pressure: 14 cmH2O  Leak: 8.9 L/minutes  Average AHI: 3.3 events/hr  Mask type: Nasal cushion  DME: [facility]    Bed time: 0588-6296  Sleep latency: 5-10 minutes  Number of times awakens during the night: 0-2  Wake time: 3685-4061 (sometimes waking up early with senior dog in the bed)  Estimated total sleep time at night: 5-8 hours  Caffeine intake: 0 cups of coffee, 0-1 cups of tea, and 1 sodas per day  Alcohol intake: 0 drinks per week  Nap time: rare   Sleepiness with Driving: none     [place] - 2    PMHx, FH, SH reviewed and pertinent changes are: Reportedly unchanged from last office visit with us.      REVIEW OF SYSTEMS:   Negative for chest pain, SOA, fever, chills, cough, N/V/D, abdominal pain.    Smoking:none    Juliana Alcazar  reports that she has never smoked. She has never used smokeless tobacco.      Exam:  Vitals:    21 1331   BP: 126/77   Pulse: 83   SpO2: 94%           21  1331   Weight: 126 kg (278 lb)     Body mass index is 47.69 kg/m². Patient's Body mass index is 47.69 kg/m². indicating that  she is morbidly obese (BMI > 40 or > 35 with obesity - related health condition). Obesity-related health conditions include the following: obstructive sleep apnea, hypertension, dyslipidemias and GERD. Obesity is unchanged. BMI is is above average; BMI management plan is completed. I recommend portion control and increasing exercise.      Gen:                No distress, conversant, pleasant, appears stated age, alert, oriented  Eyes:               Anicteric sclera, moist conjunctiva, no lid lag                           PERRL, EOMI   Heent:             NC/AT                          Oropharynx clear                          Normal hearing  Lungs:             Normal effort, non-labored breathing                          Clear to auscultation bilaterally          CV:                  Normal S1/S2, no murmur                          No lower extremity edema  ABD:               Soft, rounded, non-distended                          Normal bowel sounds                    Psych:             Appropriate affect  Neuro:             CN 2-12 appear intact    Past Medical History:   Diagnosis Date   • Acute sinusitis    • Anorectal fistula     Anorectal fistula - status post repair      • Atrial fibrillation (CMS/HCC)    • Backache    • Carpal tunnel syndrome    • Chest pain, non-cardiac    • Degenerative joint disease involving multiple joints    • Depressive disorder    • Diarrhea    • Dyspnea    • Electrocardiogram abnormal    • Essential hypertension    • Female stress incontinence    • Generalized anxiety disorder    • GERD (gastroesophageal reflux disease)    • H/O tubal ligation    • Hemorrhoids    • Hypercholesterolemia    • Hyperlipidemia    • Hypertensive disorder    • Low back pain     C/O - low back pain      • Normal gynecologic examination    • Obesity    • Otitis media, left    • Palpitations     a   • Primary fibromyalgia syndrome    • Sleep apnea    • Tachycardia    • Type 2 diabetes mellitus (CMS/HCC)               Current Outpatient Medications:   •  furosemide (LASIX) 20 MG tablet, Take 1 tablet by mouth Daily., Disp: 90 tablet, Rfl: 1  •  metoprolol tartrate (LOPRESSOR) 50 MG tablet, Take 1 tablet by mouth 2 (Two) Times a Day., Disp: 180 tablet, Rfl: 3  •  oxyCODONE-acetaminophen (PERCOCET)  MG per tablet, Take 1 tablet by mouth Every 6 (Six) Hours As Needed for Moderate Pain ., Disp: , Rfl:   •  rivaroxaban (Xarelto) 20 MG tablet, Take 1 tablet by mouth Daily., Disp: 90 tablet, Rfl: 3  •  sertraline (ZOLOFT) 100 MG tablet, TAKE 1 TABLET BY MOUTH IN  THE MORNING AND 1/2 TABLET  IN THE EVENING, Disp: 135 tablet, Rfl: 3    WBC   Date Value Ref Range Status   11/15/2018 6.42 3.20 - 9.80 10*3/mm3 Final     RBC   Date Value Ref Range Status   11/15/2018 4.24 3.77 - 5.16 10*6/mm3 Final     Hemoglobin   Date Value Ref Range Status   11/15/2018 12.8 12.0 - 15.5 g/dL Final     Hematocrit   Date Value Ref Range Status   11/15/2018 38.7 35.0 - 45.0 % Final     MCV   Date Value Ref Range Status   11/15/2018 91.3 80.0 - 98.0 fL Final     MCH   Date Value Ref Range Status   11/15/2018 30.2 26.5 - 34.0 pg Final     MCHC   Date Value Ref Range Status   11/15/2018 33.1 31.4 - 36.0 g/dL Final     RDW   Date Value Ref Range Status   11/15/2018 13.2 11.5 - 14.5 % Final     RDW-SD   Date Value Ref Range Status   11/15/2018 44.0 36.4 - 46.3 fl Final     MPV   Date Value Ref Range Status   11/15/2018 8.1 8.0 - 12.0 fL Final     Platelets   Date Value Ref Range Status   11/15/2018 263 150 - 450 10*3/mm3 Final     Neutrophil %   Date Value Ref Range Status   11/15/2018 48.2 37.0 - 80.0 % Final     Lymphocyte %   Date Value Ref Range Status   11/15/2018 38.6 10.0 - 50.0 % Final     Monocyte %   Date Value Ref Range Status   11/15/2018 9.0 0.0 - 12.0 % Final     Eosinophil %   Date Value Ref Range Status   11/15/2018 3.4 0.0 - 7.0 % Final     Basophil %   Date Value Ref Range Status   11/15/2018 0.3 0.0 - 2.0 % Final     Immature Grans %    Date Value Ref Range Status   11/15/2018 0.5 0.0 - 0.5 % Final     Neutrophils, Absolute   Date Value Ref Range Status   11/15/2018 3.09 2.00 - 8.60 10*3/mm3 Final     Lymphocytes, Absolute   Date Value Ref Range Status   11/15/2018 2.48 0.60 - 4.20 10*3/mm3 Final     Monocytes, Absolute   Date Value Ref Range Status   11/15/2018 0.58 0.00 - 0.90 10*3/mm3 Final     Eosinophils, Absolute   Date Value Ref Range Status   11/15/2018 0.22 0.00 - 0.70 10*3/mm3 Final     Basophils, Absolute   Date Value Ref Range Status   11/15/2018 0.02 0.00 - 0.20 10*3/mm3 Final     Immature Grans, Absolute   Date Value Ref Range Status   11/15/2018 0.03 (H) 0.00 - 0.02 10*3/mm3 Final     nRBC   Date Value Ref Range Status   11/15/2018 0.0 0.0 - 0.0 /100 WBC Final       Lab Results   Component Value Date    GLUCOSE 127 (H) 10/25/2019    BUN 18 10/25/2019    CREATININE 1.01 (H) 10/25/2019    EGFRIFNONA 54 (L) 10/25/2019    BCR 17.8 10/25/2019    K 4.9 10/25/2019    CO2 27.8 10/25/2019    CALCIUM 9.7 10/25/2019    ALBUMIN 4.00 10/25/2019    AST 25 10/25/2019    ALT 27 10/25/2019       Assessment and Plan:    1. Obstructive sleep apnea - Established, stable (1)  1. Compliant with PAP therapy  2. Continue PAP as prescribed  3. Script for PAP supplies  4. Return to clinic in 1 year with compliance report unless change in symptoms in interim period  2. Morbid obesity - BMI 47.6 - stable chronic illness      I spent 15 minutes caring for Juliana on this date of service. This time includes time spent by me in the following activities: preparing for the visit, reviewing tests, obtaining and/or reviewing a separately obtained history, performing a medically appropriate examination and/or evaluation , counseling and educating the patient/family/caregiver and documenting information in the medical record; discussing PAP therapy, PAP compliance and PAP maintenance    RTC in 12 months. Patient agrees to return sooner if changes in  symptoms.        This document has been electronically signed by KAVITHA Lilly on July 2, 2021 13:37 CDT          CC: Lidia Sahni APRN          No ref. provider found

## 2022-01-30 DIAGNOSIS — F32.A DEPRESSION, UNSPECIFIED DEPRESSION TYPE: ICD-10-CM

## 2022-01-31 RX ORDER — SERTRALINE HYDROCHLORIDE 100 MG/1
TABLET, FILM COATED ORAL
Qty: 135 TABLET | Refills: 0 | Status: SHIPPED | OUTPATIENT
Start: 2022-01-31 | End: 2022-02-20 | Stop reason: SDUPTHER

## 2022-02-09 ENCOUNTER — TELEPHONE (OUTPATIENT)
Dept: FAMILY MEDICINE CLINIC | Facility: CLINIC | Age: 77
End: 2022-02-09

## 2022-02-09 NOTE — TELEPHONE ENCOUNTER
Left detailed message to remind of appointment, bring insurance and medications, and to reschedule if necessary.

## 2022-02-10 ENCOUNTER — OFFICE VISIT (OUTPATIENT)
Dept: FAMILY MEDICINE CLINIC | Facility: CLINIC | Age: 77
End: 2022-02-10

## 2022-02-10 VITALS
HEIGHT: 64 IN | HEART RATE: 89 BPM | SYSTOLIC BLOOD PRESSURE: 138 MMHG | DIASTOLIC BLOOD PRESSURE: 82 MMHG | OXYGEN SATURATION: 97 % | WEIGHT: 279.6 LBS | BODY MASS INDEX: 47.73 KG/M2

## 2022-02-10 DIAGNOSIS — I48.0 PAROXYSMAL ATRIAL FIBRILLATION: ICD-10-CM

## 2022-02-10 DIAGNOSIS — I10 ESSENTIAL HYPERTENSION: Primary | ICD-10-CM

## 2022-02-10 DIAGNOSIS — E11.9 TYPE 2 DIABETES MELLITUS WITHOUT COMPLICATION, WITHOUT LONG-TERM CURRENT USE OF INSULIN: ICD-10-CM

## 2022-02-10 DIAGNOSIS — F32.A DEPRESSION, UNSPECIFIED DEPRESSION TYPE: ICD-10-CM

## 2022-02-10 DIAGNOSIS — E78.5 HYPERLIPIDEMIA, UNSPECIFIED HYPERLIPIDEMIA TYPE: ICD-10-CM

## 2022-02-10 PROCEDURE — 99214 OFFICE O/P EST MOD 30 MIN: CPT | Performed by: NURSE PRACTITIONER

## 2022-02-10 RX ORDER — METOPROLOL TARTRATE 50 MG/1
50 TABLET, FILM COATED ORAL 2 TIMES DAILY
Qty: 180 TABLET | Refills: 3 | Status: CANCELLED | OUTPATIENT
Start: 2022-02-10

## 2022-02-10 RX ORDER — ATORVASTATIN CALCIUM 10 MG/1
10 TABLET, FILM COATED ORAL DAILY
COMMUNITY
End: 2022-02-11

## 2022-02-10 RX ORDER — LISINOPRIL 10 MG/1
25 TABLET ORAL DAILY
COMMUNITY
End: 2022-02-11

## 2022-02-10 RX ORDER — FUROSEMIDE 20 MG/1
20 TABLET ORAL DAILY
Qty: 90 TABLET | Refills: 1 | Status: CANCELLED | OUTPATIENT
Start: 2022-02-10

## 2022-02-10 RX ORDER — SERTRALINE HYDROCHLORIDE 100 MG/1
TABLET, FILM COATED ORAL
Qty: 135 TABLET | Refills: 0 | Status: CANCELLED | OUTPATIENT
Start: 2022-02-10

## 2022-02-10 NOTE — PROGRESS NOTES
Chief Complaint  Med Refill, Hypertension (f/u ), Hyperlipidemia, and Diabetes    Subjective    History of Present Illness {CC  Problem List  Visit  Diagnosis   Encounters  Notes  Medications  Labs  Result Review Imaging  Media :23}     Juliana Alcazar presents to [facility] [place] for   F/u on HTN, HLP and DM - needs medication refills as well. BP appears well controlled - denies HA, blurry vision, CP, SOA or edema. Does not check BP or BS routinely at home. Labs still pending from last office visit March 2021. Sees pain mgmt for chronic pain control. Sees sleep medicine for sleep apnea/CPAP mgmt. Reports seeing tru Muniz, 3/7/22 for f/u on displaced fx of R humerus - completely healed. Voices no c/o or concerns today.        Objective     Physical Exam  Vitals and nursing note reviewed.   Constitutional:       General: She is not in acute distress.     Appearance: Normal appearance. She is obese. She is not ill-appearing.   HENT:      Head: Normocephalic and atraumatic.   Eyes:      Conjunctiva/sclera: Conjunctivae normal.      Pupils: Pupils are equal, round, and reactive to light.   Neck:      Vascular: No carotid bruit.   Cardiovascular:      Rate and Rhythm: Normal rate and regular rhythm.      Heart sounds: Normal heart sounds.   Pulmonary:      Effort: Pulmonary effort is normal.      Breath sounds: Normal breath sounds. No wheezing or rhonchi.   Musculoskeletal:         General: Normal range of motion.      Cervical back: Normal range of motion and neck supple.   Lymphadenopathy:      Cervical: No cervical adenopathy.   Skin:     General: Skin is warm and dry.   Neurological:      General: No focal deficit present.      Mental Status: She is alert and oriented to person, place, and time.   Psychiatric:         Mood and Affect: Mood normal.         Behavior: Behavior normal.        Result Review  Data Reviewed:{ Labs  Result Review  Imaging  " Med Tab  Media :23}   The following data was reviewed by (Optional):[id number]}  Common labs    Common Labsle 2/11/22 2/11/22 2/11/22 2/11/22    0933 0933 0933 0933   Glucose  177 (A)     BUN  16     Creatinine  1.17 (A)     eGFR Non African Am  45 (A)     Sodium  138     Potassium  4.4     Chloride  100     Calcium  9.6     Albumin  4.40     Total Bilirubin  0.6     Alkaline Phosphatase  71     AST (SGOT)  25     ALT (SGPT)  25     WBC 6.22      Hemoglobin 14.1      Hematocrit 43.0      Platelets 313      Total Cholesterol   156    Triglycerides   324 (A)    HDL Cholesterol   26 (A)    LDL Cholesterol    77    Hemoglobin A1C    7.50 (A)   (A) Abnormal value          No Images in the past 120 days found..       Vital Signs:   /82   Pulse 89   Ht 162.6 cm (64.02\")   Wt 127 kg (279 lb 9.6 oz)   SpO2 97%   BMI 47.96 kg/m²          Assessment and Plan {CC Problem List  Visit Diagnosis  ROS  Review (Popup)  Health Maintenance  Quality  BestPractice  Medications  SmartSets  SnapShot Encounters  Media :23}   Problem List Items Addressed This Visit        Cardiac and Vasculature    Essential hypertension - Primary    Relevant Medications    metoprolol tartrate (LOPRESSOR) 50 MG tablet    Hyperlipidemia    Paroxysmal atrial fibrillation (HCC)    Relevant Medications    metoprolol tartrate (LOPRESSOR) 50 MG tablet    rivaroxaban (Xarelto) 20 MG tablet       Endocrine and Metabolic    Type 2 diabetes mellitus without complication, without long-term current use of insulin (HCC)    Overview     -Last HbA1c was 6.2  -Will hold metformin during IP course  -ADA diet recommended; will consult nutrition/dietary  -SSI provided with regular fingersticks to monitor glucose         Relevant Medications    metFORMIN (GLUCOPHAGE) 500 MG tablet       Mental Health    Depression    Relevant Medications    sertraline (ZOLOFT) 100 MG tablet         Diagnosis Plan   1. Essential hypertension  metoprolol tartrate (LOPRESSOR) " 50 MG tablet   2. Hyperlipidemia, unspecified hyperlipidemia type     3. Type 2 diabetes mellitus without complication, without long-term current use of insulin (HCC)  metFORMIN (GLUCOPHAGE) 500 MG tablet   4. Depression, unspecified depression type  sertraline (ZOLOFT) 100 MG tablet   5. Paroxysmal atrial fibrillation (HCC)  rivaroxaban (Xarelto) 20 MG tablet     - HTN - controlled - refill metoprolol 50mg submitted - cont current dosing - requesting refill lasix - will review CMP before refill submitted  - HLP - lipid panel pending - will refill pending result  - T2DM - A1c ordered - refill of metformin 500mg BID submitted - may need to adjust dosing pending lab result for now cont current dosing.   - Depression - stable - denies SI/HI. Refill sertraline 100mg submitted - cont current dosing.   - PAF - stable - refill of xarelto submitted - cont current dosing  - Strongly encouraged pt to have labs performed - pt v/u and will have them done tomorrow when fasting.  - RTC 6 mos for f/u or as scheduled or PRN    Follow Up {Instructions Charge Capture  Follow-up Communications :23}   Return if symptoms worsen or fail to improve, for Next scheduled follow up.  Patient was given instructions and counseling regarding her condition or for health maintenance advice. Please see specific information pulled into the AVS if appropriate            .  This document has been electronically signed by KAVITHA Mixon on February 20, 2022 21:48 CST

## 2022-02-11 ENCOUNTER — LAB (OUTPATIENT)
Dept: LAB | Facility: HOSPITAL | Age: 77
End: 2022-02-11

## 2022-02-11 ENCOUNTER — CLINICAL SUPPORT (OUTPATIENT)
Dept: FAMILY MEDICINE CLINIC | Facility: CLINIC | Age: 77
End: 2022-02-11

## 2022-02-11 DIAGNOSIS — Z13.29 THYROID DISORDER SCREEN: ICD-10-CM

## 2022-02-11 DIAGNOSIS — I10 ESSENTIAL HYPERTENSION: ICD-10-CM

## 2022-02-11 DIAGNOSIS — E11.9 TYPE 2 DIABETES MELLITUS WITHOUT COMPLICATION, WITHOUT LONG-TERM CURRENT USE OF INSULIN: ICD-10-CM

## 2022-02-11 DIAGNOSIS — Z23 ENCOUNTER FOR IMMUNIZATION: Primary | ICD-10-CM

## 2022-02-11 DIAGNOSIS — E78.5 HYPERLIPIDEMIA, UNSPECIFIED HYPERLIPIDEMIA TYPE: ICD-10-CM

## 2022-02-11 DIAGNOSIS — E55.9 VITAMIN D DEFICIENCY: ICD-10-CM

## 2022-02-11 PROCEDURE — 82306 VITAMIN D 25 HYDROXY: CPT

## 2022-02-11 PROCEDURE — 84443 ASSAY THYROID STIM HORMONE: CPT

## 2022-02-11 PROCEDURE — 80053 COMPREHEN METABOLIC PANEL: CPT

## 2022-02-11 PROCEDURE — 80061 LIPID PANEL: CPT

## 2022-02-11 PROCEDURE — 84439 ASSAY OF FREE THYROXINE: CPT

## 2022-02-11 PROCEDURE — G0008 ADMIN INFLUENZA VIRUS VAC: HCPCS | Performed by: NURSE PRACTITIONER

## 2022-02-11 PROCEDURE — 90662 IIV NO PRSV INCREASED AG IM: CPT | Performed by: NURSE PRACTITIONER

## 2022-02-11 PROCEDURE — 83036 HEMOGLOBIN GLYCOSYLATED A1C: CPT

## 2022-02-11 PROCEDURE — 85027 COMPLETE CBC AUTOMATED: CPT

## 2022-02-11 RX ORDER — LISINOPRIL 10 MG/1
20 TABLET ORAL DAILY
Qty: 30 TABLET | Refills: 0 | Status: SHIPPED | OUTPATIENT
Start: 2022-02-11 | End: 2022-02-21 | Stop reason: SDUPTHER

## 2022-02-11 RX ORDER — FENOFIBRATE 54 MG/1
54 TABLET ORAL DAILY
Qty: 30 TABLET | Refills: 0 | Status: SHIPPED | OUTPATIENT
Start: 2022-02-11 | End: 2022-02-21

## 2022-02-11 RX ORDER — ATORVASTATIN CALCIUM 10 MG/1
40 TABLET, FILM COATED ORAL DAILY
Qty: 30 TABLET | Refills: 0 | Status: SHIPPED | OUTPATIENT
Start: 2022-02-11 | End: 2022-02-21

## 2022-02-12 LAB
25(OH)D3 SERPL-MCNC: 8.9 NG/ML
ALBUMIN SERPL-MCNC: 4.4 G/DL (ref 3.5–5.2)
ALBUMIN/GLOB SERPL: 1.5 G/DL
ALP SERPL-CCNC: 71 U/L (ref 39–117)
ALT SERPL W P-5'-P-CCNC: 25 U/L (ref 1–33)
ANION GAP SERPL CALCULATED.3IONS-SCNC: 9 MMOL/L (ref 5–15)
AST SERPL-CCNC: 25 U/L (ref 1–32)
BILIRUB SERPL-MCNC: 0.6 MG/DL (ref 0–1.2)
BUN SERPL-MCNC: 16 MG/DL (ref 8–23)
BUN/CREAT SERPL: 13.7 (ref 7–25)
CALCIUM SPEC-SCNC: 9.6 MG/DL (ref 8.6–10.5)
CHLORIDE SERPL-SCNC: 100 MMOL/L (ref 98–107)
CHOLEST SERPL-MCNC: 156 MG/DL (ref 0–200)
CO2 SERPL-SCNC: 29 MMOL/L (ref 22–29)
CREAT SERPL-MCNC: 1.17 MG/DL (ref 0.57–1)
DEPRECATED RDW RBC AUTO: 42.2 FL (ref 37–54)
ERYTHROCYTE [DISTWIDTH] IN BLOOD BY AUTOMATED COUNT: 12.9 % (ref 12.3–15.4)
GFR SERPL CREATININE-BSD FRML MDRD: 45 ML/MIN/1.73
GLOBULIN UR ELPH-MCNC: 2.9 GM/DL
GLUCOSE SERPL-MCNC: 177 MG/DL (ref 65–99)
HBA1C MFR BLD: 7.5 % (ref 4.8–5.6)
HCT VFR BLD AUTO: 43 % (ref 34–46.6)
HDLC SERPL-MCNC: 26 MG/DL (ref 40–60)
HGB BLD-MCNC: 14.1 G/DL (ref 12–15.9)
LDLC SERPL CALC-MCNC: 77 MG/DL (ref 0–100)
LDLC/HDLC SERPL: 2.51 {RATIO}
MCH RBC QN AUTO: 29.8 PG (ref 26.6–33)
MCHC RBC AUTO-ENTMCNC: 32.8 G/DL (ref 31.5–35.7)
MCV RBC AUTO: 90.9 FL (ref 79–97)
PLATELET # BLD AUTO: 313 10*3/MM3 (ref 140–450)
PMV BLD AUTO: 9.4 FL (ref 6–12)
POTASSIUM SERPL-SCNC: 4.4 MMOL/L (ref 3.5–5.2)
PROT SERPL-MCNC: 7.3 G/DL (ref 6–8.5)
RBC # BLD AUTO: 4.73 10*6/MM3 (ref 3.77–5.28)
SODIUM SERPL-SCNC: 138 MMOL/L (ref 136–145)
T4 FREE SERPL-MCNC: 1.25 NG/DL (ref 0.93–1.7)
TRIGL SERPL-MCNC: 324 MG/DL (ref 0–150)
TSH SERPL DL<=0.05 MIU/L-ACNC: 3.2 UIU/ML (ref 0.27–4.2)
VLDLC SERPL-MCNC: 53 MG/DL (ref 5–40)
WBC NRBC COR # BLD: 6.22 10*3/MM3 (ref 3.4–10.8)

## 2022-02-18 ENCOUNTER — TELEPHONE (OUTPATIENT)
Dept: FAMILY MEDICINE CLINIC | Facility: CLINIC | Age: 77
End: 2022-02-18

## 2022-02-18 NOTE — TELEPHONE ENCOUNTER
I don't see where any of them were cancelled. Do you know what she may be talking about before I call her?

## 2022-02-18 NOTE — TELEPHONE ENCOUNTER
Patient called stating that she received an e-mail telling her the medication refills have been cancelled and to contact her doctor's office for any questions. She said she brought in all her medications and she can bring them back in if she needs to because she is currently out of some of them and wasn't sure what happened. Would like a call back from Korin ERNST MA in regards to her medication.

## 2022-02-20 RX ORDER — SERTRALINE HYDROCHLORIDE 100 MG/1
TABLET, FILM COATED ORAL
Qty: 135 TABLET | Refills: 3 | Status: SHIPPED | OUTPATIENT
Start: 2022-02-20 | End: 2023-03-08 | Stop reason: SDUPTHER

## 2022-02-20 RX ORDER — METOPROLOL TARTRATE 50 MG/1
50 TABLET, FILM COATED ORAL 2 TIMES DAILY
Qty: 180 TABLET | Refills: 3 | Status: SHIPPED | OUTPATIENT
Start: 2022-02-20

## 2022-02-21 DIAGNOSIS — E78.5 HYPERLIPIDEMIA, UNSPECIFIED HYPERLIPIDEMIA TYPE: ICD-10-CM

## 2022-02-21 DIAGNOSIS — I10 ESSENTIAL HYPERTENSION: Primary | ICD-10-CM

## 2022-02-21 RX ORDER — ATORVASTATIN CALCIUM 40 MG/1
40 TABLET, FILM COATED ORAL DAILY
Qty: 90 TABLET | Refills: 3 | Status: SHIPPED | OUTPATIENT
Start: 2022-02-21

## 2022-02-21 RX ORDER — FENOFIBRATE 54 MG/1
54 TABLET ORAL DAILY
Qty: 90 TABLET | Refills: 1 | Status: SHIPPED | OUTPATIENT
Start: 2022-02-21

## 2022-02-21 RX ORDER — LISINOPRIL 20 MG/1
20 TABLET ORAL DAILY
Qty: 90 TABLET | Refills: 3 | Status: SHIPPED | OUTPATIENT
Start: 2022-02-21

## 2022-02-21 RX ORDER — LISINOPRIL 10 MG/1
20 TABLET ORAL DAILY
Qty: 30 TABLET | Refills: 0 | OUTPATIENT
Start: 2022-02-21

## 2022-02-24 DIAGNOSIS — E78.2 MIXED HYPERLIPIDEMIA: ICD-10-CM

## 2022-02-24 DIAGNOSIS — E55.9 VITAMIN D DEFICIENCY: Primary | ICD-10-CM

## 2022-02-24 DIAGNOSIS — N28.9 DECREASED RENAL FUNCTION: ICD-10-CM

## 2022-02-24 RX ORDER — EZETIMIBE 10 MG/1
10 TABLET ORAL DAILY
Qty: 90 TABLET | Refills: 3 | Status: SHIPPED | OUTPATIENT
Start: 2022-02-24 | End: 2023-01-04

## 2022-02-24 RX ORDER — ERGOCALCIFEROL 1.25 MG/1
50000 CAPSULE ORAL
Qty: 5 CAPSULE | Refills: 4 | Status: SHIPPED | OUTPATIENT
Start: 2022-02-24

## 2022-04-25 ENCOUNTER — OFFICE VISIT (OUTPATIENT)
Dept: FAMILY MEDICINE CLINIC | Facility: CLINIC | Age: 77
End: 2022-04-25

## 2022-04-25 VITALS
DIASTOLIC BLOOD PRESSURE: 80 MMHG | OXYGEN SATURATION: 98 % | BODY MASS INDEX: 46.23 KG/M2 | TEMPERATURE: 97.1 F | HEART RATE: 84 BPM | HEIGHT: 64 IN | RESPIRATION RATE: 20 BRPM | SYSTOLIC BLOOD PRESSURE: 134 MMHG | WEIGHT: 270.8 LBS

## 2022-04-25 DIAGNOSIS — E66.01 MORBID OBESITY WITH BODY MASS INDEX (BMI) OF 45.0 TO 49.9 IN ADULT: ICD-10-CM

## 2022-04-25 DIAGNOSIS — E11.9 TYPE 2 DIABETES MELLITUS WITHOUT COMPLICATION, WITHOUT LONG-TERM CURRENT USE OF INSULIN: Primary | ICD-10-CM

## 2022-04-25 DIAGNOSIS — Z00.00 MEDICARE ANNUAL WELLNESS VISIT, SUBSEQUENT: Primary | ICD-10-CM

## 2022-04-25 DIAGNOSIS — Z12.31 ENCOUNTER FOR SCREENING MAMMOGRAM FOR BREAST CANCER: ICD-10-CM

## 2022-04-25 DIAGNOSIS — Z13.820 ENCOUNTER FOR OSTEOPOROSIS SCREENING IN ASYMPTOMATIC POSTMENOPAUSAL PATIENT: ICD-10-CM

## 2022-04-25 DIAGNOSIS — Z78.0 ENCOUNTER FOR OSTEOPOROSIS SCREENING IN ASYMPTOMATIC POSTMENOPAUSAL PATIENT: ICD-10-CM

## 2022-04-25 PROCEDURE — 1159F MED LIST DOCD IN RCRD: CPT | Performed by: NURSE PRACTITIONER

## 2022-04-25 PROCEDURE — 1170F FXNL STATUS ASSESSED: CPT | Performed by: NURSE PRACTITIONER

## 2022-04-25 PROCEDURE — G0439 PPPS, SUBSEQ VISIT: HCPCS | Performed by: NURSE PRACTITIONER

## 2022-04-25 NOTE — PATIENT INSTRUCTIONS
Medicare Wellness  Personal Prevention Plan of Service     Date of Office Visit:    Encounter Provider:  KAVITHA Mixon  Place of Service:  [facility]  Patient Name: uJliana Alcazar  :  1945    As part of the Medicare Wellness portion of your visit today, we are providing you with this personalized preventive plan of services (PPPS). This plan is based upon recommendations of the [place] Preventive Services Task Force (USPSTF) and the Advisory Committee on Immunization Practices (ACIP).    This lists the preventive care services that should be considered, and provides dates of when you are due. Items listed as completed are up-to-date and do not require any further intervention.    Health Maintenance   Topic Date Due    DXA SCAN  Never done    DIABETIC FOOT EXAM  10/25/2020    URINE MICROALBUMIN  10/25/2020    DIABETIC EYE EXAM  2021    MAMMOGRAM  2022    INFLUENZA VACCINE  2022    HEMOGLOBIN A1C  2022    LIPID PANEL  2023    ANNUAL WELLNESS VISIT  2023    TDAP/TD VACCINES (2 - Td or Tdap) 2027    COLORECTAL CANCER SCREENING  2027    HEPATITIS C SCREENING  Completed    COVID-19 Vaccine  Completed    Pneumococcal Vaccine 65+  Completed    ZOSTER VACCINE  Discontinued       No orders of the defined types were placed in this encounter.      Return in about 1 year (around 2023) for Medicare Wellness.

## 2022-04-25 NOTE — PROGRESS NOTES
The ABCs of the Annual Wellness Visit  Subsequent Medicare Wellness Visit    Chief Complaint   Patient presents with   • Annual Exam     Medicare Wellness      Subjective    History of Present Illness:  Juliana Alcazar is a 76 y.o. female who presents for a Subsequent Medicare Wellness Visit.    The following portions of the patient's history were reviewed and   updated as appropriate: allergies, current medications, past family history, past medical history, past social history, past surgical history and problem list.    Compared to one year ago, the patient feels her physical   health is worse.    Compared to one year ago, the patient feels her mental   health is worse.    Recent Hospitalizations:  She was not admitted to the hospital during the last year.       Current Medical Providers:  Patient Care Team:  Lidia Sahni APRN as PCP - General (Family Medicine)  SUJATA Nielsen MD as Consulting Physician (Cardiac Electrophysiology)    Outpatient Medications Prior to Visit   Medication Sig Dispense Refill   • atorvastatin (LIPITOR) 40 MG tablet Take 1 tablet by mouth Daily. 90 tablet 3   • ezetimibe (Zetia) 10 MG tablet Take 1 tablet by mouth Daily. 90 tablet 3   • fenofibrate (TRICOR) 54 MG tablet TAKE 1 TABLET BY MOUTH  DAILY 90 tablet 1   • furosemide (LASIX) 20 MG tablet Take 1 tablet by mouth Daily. 90 tablet 1   • lisinopril (PRINIVIL,ZESTRIL) 20 MG tablet Take 1 tablet by mouth Daily. 90 tablet 3   • metFORMIN (GLUCOPHAGE) 500 MG tablet Take 1 tablet by mouth 2 (Two) Times a Day With Meals. 180 tablet 3   • metoprolol tartrate (LOPRESSOR) 50 MG tablet Take 1 tablet by mouth 2 (Two) Times a Day. 180 tablet 3   • oxyCODONE-acetaminophen (PERCOCET)  MG per tablet Take 1 tablet by mouth Every 6 (Six) Hours As Needed for Moderate Pain .     • rivaroxaban (Xarelto) 20 MG tablet Take 1 tablet by mouth Daily. 90 tablet 3   • sertraline (ZOLOFT) 100 MG tablet TAKE 1 TABLET BY MOUTH IN  THE MORNING AND  "ONE-HALF  TABLET BY MOUTH IN THE  EVENING 135 tablet 3   • vitamin D (ERGOCALCIFEROL) 1.25 MG (36689 UT) capsule capsule Take 1 capsule by mouth Every 7 (Seven) Days. 5 capsule 4     No facility-administered medications prior to visit.       Opioid medication/s are on active medication list.  and I have evaluated her active treatment plan and pain score trends (see table).  Vitals:    04/25/22 1110   PainSc: 0-No pain     I have reviewed the chart for potential of high risk medication and harmful drug interactions in the elderly.            Aspirin is not on active medication list.  Aspirin use is contraindicated for this patient due to: current use of Xarelto.  .    Patient Active Problem List   Diagnosis   • Arthralgia of right hip   • Thoracic back pain   • Depression   • Gastroesophageal reflux disease without esophagitis   • Type 2 diabetes mellitus without complication, without long-term current use of insulin (HCC)   • Essential hypertension   • Hyperlipidemia   • Chronic left-sided low back pain without sciatica   • BMI 45.0-49.9, adult (HCC)   • Polypharmacy   • Chest pain   • Chronic pain   • Paroxysmal atrial fibrillation (HCC)     Advance Care Planning  Advance Directive is not on file.  ACP discussion was held with the patient during this visit. Patient does not have an advance directive, information provided.          Objective    Vitals:    04/25/22 1110   BP: 134/80   BP Location: Left arm   Patient Position: Sitting   Cuff Size: Adult   Pulse: 84   Resp: 20   Temp: 97.1 °F (36.2 °C)   TempSrc: Temporal   SpO2: 98%   Weight: 123 kg (270 lb 12.8 oz)   Height: 162.6 cm (64.02\")   PainSc: 0-No pain     BMI Readings from Last 1 Encounters:   04/25/22 46.45 kg/m²   BMI is above normal parameters. Recommendations include: educational material, exercise counseling and nutrition counseling    Does the patient have evidence of cognitive impairment? No    Physical Exam  Lab Results   Component Value Date    " TRIG 324 (H) 02/11/2022    HDL 26 (L) 02/11/2022    LDL 77 02/11/2022    VLDL 53 (H) 02/11/2022    HGBA1C 7.50 (H) 02/11/2022            HEALTH RISK ASSESSMENT    Smoking Status:  Social History     Tobacco Use   Smoking Status Never Smoker   Smokeless Tobacco Never Used     Alcohol Consumption:  Social History     Substance and Sexual Activity   Alcohol Use No     Fall Risk Screen:    STEADI Fall Risk Assessment was completed, and patient is at HIGH risk for falls. Assessment completed on:4/25/2022    Depression Screening:  PHQ-2/PHQ-9 Depression Screening 4/25/2022   Retired Total Score -   Little Interest or Pleasure in Doing Things 1-->several days   Feeling Down, Depressed or Hopeless 0-->not at all   PHQ-9: Brief Depression Severity Measure Score 1       Health Habits and Functional and Cognitive Screening:  Functional & Cognitive Status 4/25/2022   Do you have difficulty preparing food and eating? No   Do you have difficulty bathing yourself, getting dressed or grooming yourself? No   Do you have difficulty using the toilet? No   Do you have difficulty moving around from place to place? Yes   Do you have trouble with steps or getting out of a bed or a chair? Yes   Current Diet Other   Dental Exam Up to date   Eye Exam Not up to date   Exercise (times per week) 0 times per week   Current Exercises Include No Regular Exercise   Current Exercise Activities Include -   Do you need help using the phone?  No   Are you deaf or do you have serious difficulty hearing?  No   Do you need help with transportation? Yes   Do you need help shopping? No   Do you need help preparing meals?  No   Do you need help with housework?  No   Do you need help with laundry? No   Do you need help taking your medications? No   Do you need help managing money? No   Do you ever drive or ride in a car without wearing a seat belt? No   Have you felt unusual stress, anger or loneliness in the last month? Yes   Who do you live with? Spouse    If you need help, do you have trouble finding someone available to you? No   Have you been bothered in the last four weeks by sexual problems? No   Do you have difficulty concentrating, remembering or making decisions? No       Age-appropriate Screening Schedule:  Refer to the list below for future screening recommendations based on patient's age, sex and/or medical conditions. Orders for these recommended tests are listed in the plan section. The patient has been provided with a written plan.    Health Maintenance   Topic Date Due   • DXA SCAN  Never done   • DIABETIC FOOT EXAM  10/25/2020   • URINE MICROALBUMIN  10/25/2020   • DIABETIC EYE EXAM  08/31/2021   • MAMMOGRAM  01/08/2022   • INFLUENZA VACCINE  08/01/2022   • HEMOGLOBIN A1C  08/11/2022   • LIPID PANEL  02/11/2023   • TDAP/TD VACCINES (2 - Td or Tdap) 01/30/2027   • ZOSTER VACCINE  Discontinued              Assessment/Plan   CMS Preventative Services Quick Reference  Risk Factors Identified During Encounter  Cardiovascular Disease  Chronic Pain   Depression/Dysphoria  Fall Risk-High or Moderate  Inactivity/Sedentary  Obesity/Overweight   Polypharmacy  The above risks/problems have been discussed with the patient.  Follow up actions/plans if indicated are seen below in the Assessment/Plan Section.  Pertinent information has been shared with the patient in the After Visit Summary.    Diagnoses and all orders for this visit:    1. Medicare annual wellness visit, subsequent (Primary)    2. Morbid obesity with body mass index (BMI) of 45.0 to 49.9 in adult (HCC)    3. Encounter for screening mammogram for breast cancer  -     Mammo Screening Bilateral With CAD; Future    4. Encounter for osteoporosis screening in asymptomatic postmenopausal patient  -     DEXA Bone Density Axial; Future    Body mass index is 46.45 kg/m². BMI is considered morbidly obese - advise healthy diet and exercise for weight loss. Nutritional material provided.     Follow Up:   Return  in about 1 year (around 4/25/2023) for Medicare Wellness.     An After Visit Summary and PPPS were made available to the patient.                   This document has been electronically signed by KAVITHA Mixon on April 25, 2022 11:23 CDT

## 2022-06-03 ENCOUNTER — TELEPHONE (OUTPATIENT)
Dept: FAMILY MEDICINE CLINIC | Facility: CLINIC | Age: 77
End: 2022-06-03

## 2022-06-03 NOTE — TELEPHONE ENCOUNTER
Spoke with patient and gave the following results.  Mammogram- Normal- repeat in 1-2 years  Dexa Scan- Positive for Osteopenia- recommended referral to [facility].     Patient agreed to the referral.

## 2022-06-06 DIAGNOSIS — M85.80 OSTEOPENIA, UNSPECIFIED LOCATION: Primary | ICD-10-CM

## 2022-06-08 DIAGNOSIS — Z78.0 ENCOUNTER FOR OSTEOPOROSIS SCREENING IN ASYMPTOMATIC POSTMENOPAUSAL PATIENT: ICD-10-CM

## 2022-06-08 DIAGNOSIS — Z12.31 ENCOUNTER FOR SCREENING MAMMOGRAM FOR BREAST CANCER: ICD-10-CM

## 2022-06-08 DIAGNOSIS — Z13.820 ENCOUNTER FOR OSTEOPOROSIS SCREENING IN ASYMPTOMATIC POSTMENOPAUSAL PATIENT: ICD-10-CM

## 2022-06-13 ENCOUNTER — OFFICE VISIT (OUTPATIENT)
Dept: ORTHOPEDIC SURGERY | Facility: CLINIC | Age: 77
End: 2022-06-13

## 2022-06-13 VITALS — HEIGHT: 64 IN | BODY MASS INDEX: 46.85 KG/M2 | WEIGHT: 274.4 LBS

## 2022-06-13 DIAGNOSIS — M81.0 AGE-RELATED OSTEOPOROSIS WITHOUT CURRENT PATHOLOGICAL FRACTURE: Primary | ICD-10-CM

## 2022-06-13 DIAGNOSIS — E55.9 VITAMIN D DEFICIENCY: ICD-10-CM

## 2022-06-13 DIAGNOSIS — K21.9 GASTROESOPHAGEAL REFLUX DISEASE WITHOUT ESOPHAGITIS: ICD-10-CM

## 2022-06-13 DIAGNOSIS — N18.31 STAGE 3A CHRONIC KIDNEY DISEASE: ICD-10-CM

## 2022-06-13 DIAGNOSIS — Z78.0 POSTMENOPAUSAL: ICD-10-CM

## 2022-06-13 DIAGNOSIS — Z98.84 HISTORY OF BARIATRIC SURGERY: ICD-10-CM

## 2022-06-13 DIAGNOSIS — E11.9 TYPE 2 DIABETES MELLITUS WITHOUT COMPLICATION, WITHOUT LONG-TERM CURRENT USE OF INSULIN: ICD-10-CM

## 2022-06-13 PROCEDURE — 99214 OFFICE O/P EST MOD 30 MIN: CPT | Performed by: NURSE PRACTITIONER

## 2022-06-13 NOTE — PROGRESS NOTES
Juliana Alcazar is a 76 y.o. female returns for     Chief Complaint   Patient presents with   • Osteoporosis     HISTORY OF PRESENT ILLNESS: 76-year-old  female patient [facility] accompanied by her spouse for a bone health evaluation and treatment of osteopenia/osteoporosis.    Last Bone Density Study: 5/26/2022    Referred by: KAVITHA Snow (primary care)    History of Osteoporosis or Osteopenia: No.  Osteopenia is a new diagnosis based on her recent DEXA scan.  Prior Treatments for Osteoporosis: None    Decrease in Height: Patient is unsure.    Fracture History: History of a left distal radius fracture requiring ORIF that occurred in July 2020 due to a fall from her porch.  History of a comminuted right proximal humerus fracture that occurred in August 2021 due to a fall from a standing height, treated nonsurgically.    High Risk Medications:   Current: Lasix   Previous: Protonix, Prozac, intermittent use of corticosteroids for various conditions; No long-term use of systemic steroids greater than 3 months reported.    Comorbid Medical Conditions that Increase Risk for Osteoporosis: Diabetes mellitus-type II, chronic kidney disease-stage III, vitamin D deficiency, history of bariatric surgery in 1976 with a second surgery for reversal in 1978.    Postmenopausal status: Postmenopausal.  Patient went through natural menopause at a normal age of 51 but later had to have a hysterectomy at age 74.  Age of Menopause/Hysterectomy: 51  Hormone Replacement Therapy: None    Family History of Osteoporosis: Yes, history of osteoporosis in her mother.    History of Smoking: No, never smoker.    Taking Calcium supplements: No.  Patient states she took calcium supplementation after her bariatric surgery but has not taking calcium in many years because she states she has always had normal calcium levels since then.  Taking Vitamin D supplements: Yes, taking high-dose vitamin D supplement of  "50,000 units weekly.  Patient has a history of vitamin D deficiency.  Last vitamin D level on file is from 2/11/2022 and her level at that time was 8.9.       CONCURRENT MEDICAL HISTORY:    The following portions of the patient's history were reviewed and updated as appropriate: allergies, current medications, past family history, past medical history, past social history, past surgical history and problem list.     ROS  No fevers or chills.  No chest pain or shortness of air.  No GI or  disturbances.     PHYSICAL EXAMINATION:       Ht 162.6 cm (64.02\")   Wt 124 kg (274 lb 6.4 oz)   BMI 47.07 kg/m²     Physical Exam  Vitals reviewed.   Constitutional:       General: She is not in acute distress.     Appearance: She is well-developed. She is obese. She is not ill-appearing.   HENT:      Head: Normocephalic.   Pulmonary:      Effort: Pulmonary effort is normal. No respiratory distress.   Abdominal:      General: There is no distension.      Palpations: Abdomen is soft.   Skin:     General: Skin is warm and dry.      Capillary Refill: Capillary refill takes less than 2 seconds.      Findings: No erythema.   Neurological:      Mental Status: She is alert and oriented to person, place, and time.      GCS: GCS eye subscore is 4. GCS verbal subscore is 5. GCS motor subscore is 6.   Psychiatric:         Speech: Speech normal.         Behavior: Behavior normal.         Thought Content: Thought content normal.         Judgment: Judgment normal.         GAIT:     []  Normal  [x]  Antalgic    Assistive device: [x]  None  []  Walker     []  Crutches  []  Cane     []  Wheelchair  []  Stretcher    Back Exam     Other   Gait: antalgic     Comments:  No kyphosis or deformity noted.  No focal neurological deficits noted.          Bone Density Study performed @ [facility] on 5/26/2022    LUMBAR SPINE: The average bone mineral density within the lumbar spine is 1.098 g/cm².  As compared to the standard young " normals, the T score is 0.5.  As compared to the age and gender matched subjects, the Z score is 2.9.    LEFT HIP: The average bone mineral density within the left hip is 0.610 g/cm².  As compared to the standard young normals, the T score is -2.1.  As compared to the age and gender matched subjects, the Z score is 0.0.    The 10-year major osteoporotic fracture risk is 18%.    IMPRESSION:  The bone mineral density according to the WHO criteria is indicative of osteopenia.        ASSESSMENT:    Diagnoses and all orders for this visit:    Age-related osteoporosis without current pathological fracture    Type 2 diabetes mellitus without complication, without long-term current use of insulin (HCC)    Postmenopausal    Stage 3a chronic kidney disease (HCC)    Vitamin D deficiency    Gastroesophageal reflux disease without esophagitis    History of bariatric surgery    PLAN    Bone density study results from 5/26/2022 reviewed and discussed with patient and spouse today.  We discussed that she has a T score of 0.5 in the lumbar spine, indicative of normal bone density.  We discussed that she has a T score of -2.1 in the left hip, indicative of advancing osteopenia.  We discussed that she is at higher risk for fracture as well as higher risk for developing osteoporosis.  However, we discussed that her diagnosis would changed osteoporosis based on the fractures that she has sustained in recent years.  The patient previously sustained a left distal radius fracture in July 2020, requiring ORIF when she fell from her porch onto her outstretched left hand.  Following this, she sustained a comminuted fracture of her right proximal humerus in August 2021 due to a fall from a standing height.  We discussed that the diagnosis changes to osteoporosis in the setting of osteopenia and at her age of 76.  We discussed that osteoporosis is a chronic, progressive and silent disease that typically requires long-term treatment.  We  discussed that she would be at a high risk for additional fractures due to osteoporosis, including major fractures such as the hip and spine.  Currently, her risk for a major osteoporotic fracture is estimated to be approximately 18%.  We discussed her individualized risk factors for osteoporosis including her age, race, gender, postmenopausal status, positive family history and comorbid medical conditions including type 2 diabetes mellitus, chronic kidney disease, vitamin D deficiency and her history of prior bariatric surgery.    I have recommended that the patient start treatment for osteoporosis in an effort to prevent worsening osteoporosis and reduce her risk for additional fractures.  We discussed various pharmaceutical treatment options today including oral bisphosphonates, Prolia, Evenity, Tymlos and Forteo.  However, I have recommended against oral bisphosphonates due to the patient's history of chronic kidney disease.  Her most recent labs from February 2022 are reviewed today and she is noted to have had a GFR of 45 at that time.  I would also advise against use of oral bisphosphonates due to her history of prior bariatric surgery in 1976 following the reversal of this in 1978 and we discussed that she would be at higher risk for developing adverse GI side effects.  She also has a history of reflux.  I have recommended Prolia for this patient to take for her osteoporosis.  We discussed risk versus benefits of Prolia.  We discussed potential adverse side effects of Prolia.  Written literature regarding Prolia is provided to the patient today.    We discussed the importance of proper nutrition and adequate intake of calcium and vitamin D for optimal bone health and prevention of worsening osteoporosis.  She is not currently taking a calcium supplement and we discussed starting a calcium supplement once or twice daily, such as Citracal or Caltrate, which can be purchased over-the-counter.  She is already  taking a high-dose vitamin D supplement of 50,000 units weekly and she continue with this as she has marked vitamin D deficiency and her vitamin D level in February 2022 is noted to have been 8.9.  We also discussed the importance of regular exercise, especially weightbearing exercise such as walking, for optimal bone health and prevention of worsening osteoporosis. An educational packet regarding osteoporosis is provided to the patient today.  The patient is a non-smoker with no history of smoking so smoking cessation is not addressed or applicable.    Time spent of a minimum of 30 minutes including the face to face evaluation, reviewing of medical history and prior medial records, reviewing of diagnostic studies, prescription drug management, documentation, patient education and coordination of care.     EMR Dragon/Transciption Disclaimer: Some of this note may be an electronic transcription/translation of spoken language to printed text using the Dragon Dictation System.     Return in about 1 year (around 6/13/2023) for Recheck.        This document has been electronically signed by KAVITHA Cardoza on June 13, 2022 11:24 CDT      KAVITHA Cardoza

## 2022-07-05 ENCOUNTER — OFFICE VISIT (OUTPATIENT)
Dept: SLEEP MEDICINE | Facility: HOSPITAL | Age: 77
End: 2022-07-05

## 2022-07-05 VITALS
SYSTOLIC BLOOD PRESSURE: 144 MMHG | WEIGHT: 269.7 LBS | BODY MASS INDEX: 46.04 KG/M2 | OXYGEN SATURATION: 96 % | DIASTOLIC BLOOD PRESSURE: 78 MMHG | HEART RATE: 80 BPM | HEIGHT: 64 IN

## 2022-07-05 DIAGNOSIS — G47.33 OBSTRUCTIVE SLEEP APNEA, ADULT: Primary | ICD-10-CM

## 2022-07-05 DIAGNOSIS — E66.01 MORBID OBESITY: ICD-10-CM

## 2022-07-05 PROCEDURE — 99213 OFFICE O/P EST LOW 20 MIN: CPT | Performed by: NURSE PRACTITIONER

## 2022-07-05 NOTE — PROGRESS NOTES
Sleep Clinic Follow Up    Date: 2022  Primary Care Provider: Lidia Sahni APRN    Last office visit: 2021 (I reviewed this note)    CC: Follow up: JEANNETTE on CPAP      Interim History:  Since the last visit:    1) severe JEANNETTE -  Juliana Alcazar has remained compliant with CPAP. She denies mask and machine issues, dry mouth, headaches, or pressures intolerance. She denies abnormal dreams, sleep paralysis, nasal congestion, URI sx.    2) Patient denies RLS symptoms.     Sleep Testin. Split night PSG on 2017, AHI of 54.5   2. CPAP titration on same day, recommended 14 cm H2O   3. Currently on 14 cm H2O    PAP Data:    Time frame: 2021-2022   Compliance: 94%  Average use on days used: 6 hrs 18 min  Percent of days with usage greater than or equal to 4 hours: 85%  PAP range: 14 cm H2O  Average 90% pressure: 14 cmH2O  Leak: 13.4 L/minute  Average AHI: 3.2 events/hr  Mask type: Nasal cushion  DME: [facility]    Bed time: 9900-0207  Sleep latency: 5-10 minutes  Number of times awakens during the night: 0  Wake time: 0630  Estimated total sleep time at night: 6-8 hours  Caffeine intake: 1 cups of coffee, 0-1 cups of tea, and 0-1 sodas per day  Alcohol intake: 0 drinks per week  Nap time: rare   Sleepiness with Driving: none     Peculiar - 2  Peculiar Sleepiness Scale  Sitting and reading: Would never doze  Watching TV: Slight chance of dozing  Sitting, inactive in a public place (e.g. a theatre or a meeting): Would never doze  As a passenger in a car for an hour without a break: Would never doze  Lying down to rest in the afternoon when circumstances permit: Slight chance of dozing  Sitting and talking to someone: Would never doze  Sitting quietly after a lunch without alcohol: Would never doze  In a car, while stopped for a few minutes in traffic: Would never doze  Total score: 2    PMHx, FH, SH reviewed and pertinent changes are: Reportedly unchanged from last office  visit with us.      REVIEW OF SYSTEMS:   Negative for chest pain, SOA, fever, chills, cough, N/V/D, abdominal pain.    Smoking:none    Juliana Alcazar  reports that she has never smoked. She has never used smokeless tobacco.    Exam:  Vitals:    07/05/22 1128   BP: 144/78   Pulse: 80   SpO2: 96%           07/05/22  1128   Weight: 122 kg (269 lb 11.2 oz)     Body mass index is 46.27 kg/m². Class 3 Severe Obesity (BMI >=40). Obesity-related health conditions include the following: obstructive sleep apnea, hypertension, diabetes mellitus, dyslipidemias and GERD. Obesity is unchanged. BMI is is above average; BMI management plan is completed. I recommend portion control and increasing exercise.    Gen:                No distress, conversant, pleasant, appears stated age, alert, oriented  Eyes:               Anicteric sclera, moist conjunctiva, no lid lag                           PERRL, EOMI   Heent:             NC/AT                          Oropharynx clear                          Normal hearing  Lungs:             Normal effort, non-labored breathing  CV:                  Normal S1/S2                          No lower extremity edema  ABD:               Soft, rounded, non-distended                Psych:             Appropriate affect  Neuro:             CN 2-12 appear intact    Past Medical History:   Diagnosis Date   • Acute sinusitis    • Anorectal fistula     Anorectal fistula - status post repair      • Atrial fibrillation (HCC)    • Backache    • Carpal tunnel syndrome    • Chest pain, non-cardiac    • Degenerative joint disease involving multiple joints    • Depressive disorder    • Diarrhea    • Dyspnea    • Electrocardiogram abnormal    • Essential hypertension    • Female stress incontinence    • Generalized anxiety disorder    • GERD (gastroesophageal reflux disease)    • H/O tubal ligation    • Hemorrhoids    • Hypercholesterolemia    • Hyperlipidemia    • Hypertensive disorder    • Low back pain     C/O  - low back pain      • Normal gynecologic examination    • Obesity    • Otitis media, left    • Palpitations     a   • Primary fibromyalgia syndrome    • Sleep apnea    • Tachycardia    • Type 2 diabetes mellitus (HCC)        Current Outpatient Medications:   •  atorvastatin (LIPITOR) 40 MG tablet, Take 1 tablet by mouth Daily., Disp: 90 tablet, Rfl: 3  •  ezetimibe (Zetia) 10 MG tablet, Take 1 tablet by mouth Daily., Disp: 90 tablet, Rfl: 3  •  fenofibrate (TRICOR) 54 MG tablet, TAKE 1 TABLET BY MOUTH  DAILY, Disp: 90 tablet, Rfl: 1  •  furosemide (LASIX) 20 MG tablet, Take 1 tablet by mouth Daily., Disp: 90 tablet, Rfl: 1  •  lisinopril (PRINIVIL,ZESTRIL) 20 MG tablet, Take 1 tablet by mouth Daily., Disp: 90 tablet, Rfl: 3  •  metFORMIN (GLUCOPHAGE) 500 MG tablet, Take 1 tablet by mouth 2 (Two) Times a Day With Meals., Disp: 180 tablet, Rfl: 3  •  metoprolol tartrate (LOPRESSOR) 50 MG tablet, Take 1 tablet by mouth 2 (Two) Times a Day., Disp: 180 tablet, Rfl: 3  •  oxyCODONE-acetaminophen (PERCOCET)  MG per tablet, Take 1 tablet by mouth Every 6 (Six) Hours As Needed for Moderate Pain ., Disp: , Rfl:   •  rivaroxaban (Xarelto) 20 MG tablet, Take 1 tablet by mouth Daily., Disp: 90 tablet, Rfl: 3  •  sertraline (ZOLOFT) 100 MG tablet, TAKE 1 TABLET BY MOUTH IN  THE MORNING AND ONE-HALF  TABLET BY MOUTH IN THE  EVENING, Disp: 135 tablet, Rfl: 3  •  vitamin D (ERGOCALCIFEROL) 1.25 MG (92444 UT) capsule capsule, Take 1 capsule by mouth Every 7 (Seven) Days., Disp: 5 capsule, Rfl: 4    WBC   Date Value Ref Range Status   02/11/2022 6.22 3.40 - 10.80 10*3/mm3 Final     RBC   Date Value Ref Range Status   02/11/2022 4.73 3.77 - 5.28 10*6/mm3 Final     Hemoglobin   Date Value Ref Range Status   02/11/2022 14.1 12.0 - 15.9 g/dL Final     Hematocrit   Date Value Ref Range Status   02/11/2022 43.0 34.0 - 46.6 % Final     MCV   Date Value Ref Range Status   02/11/2022 90.9 79.0 - 97.0 fL Final     MCH   Date Value Ref  Range Status   02/11/2022 29.8 26.6 - 33.0 pg Final     MCHC   Date Value Ref Range Status   02/11/2022 32.8 31.5 - 35.7 g/dL Final     RDW   Date Value Ref Range Status   02/11/2022 12.9 12.3 - 15.4 % Final     RDW-SD   Date Value Ref Range Status   02/11/2022 42.2 37.0 - 54.0 fl Final     MPV   Date Value Ref Range Status   02/11/2022 9.4 6.0 - 12.0 fL Final     Platelets   Date Value Ref Range Status   02/11/2022 313 140 - 450 10*3/mm3 Final     Neutrophil %   Date Value Ref Range Status   11/15/2018 48.2 37.0 - 80.0 % Final     Lymphocyte %   Date Value Ref Range Status   11/15/2018 38.6 10.0 - 50.0 % Final     Monocyte %   Date Value Ref Range Status   11/15/2018 9.0 0.0 - 12.0 % Final     Eosinophil %   Date Value Ref Range Status   11/15/2018 3.4 0.0 - 7.0 % Final     Basophil %   Date Value Ref Range Status   11/15/2018 0.3 0.0 - 2.0 % Final     Immature Grans %   Date Value Ref Range Status   11/15/2018 0.5 0.0 - 0.5 % Final     Neutrophils, Absolute   Date Value Ref Range Status   11/15/2018 3.09 2.00 - 8.60 10*3/mm3 Final     Lymphocytes, Absolute   Date Value Ref Range Status   11/15/2018 2.48 0.60 - 4.20 10*3/mm3 Final     Monocytes, Absolute   Date Value Ref Range Status   11/15/2018 0.58 0.00 - 0.90 10*3/mm3 Final     Eosinophils, Absolute   Date Value Ref Range Status   11/15/2018 0.22 0.00 - 0.70 10*3/mm3 Final     Basophils, Absolute   Date Value Ref Range Status   11/15/2018 0.02 0.00 - 0.20 10*3/mm3 Final     Immature Grans, Absolute   Date Value Ref Range Status   11/15/2018 0.03 (H) 0.00 - 0.02 10*3/mm3 Final     nRBC   Date Value Ref Range Status   11/15/2018 0.0 0.0 - 0.0 /100 WBC Final       Lab Results   Component Value Date    GLUCOSE 177 (H) 02/11/2022    BUN 16 02/11/2022    CREATININE 1.17 (H) 02/11/2022    EGFRIFNONA 45 (L) 02/11/2022    BCR 13.7 02/11/2022    K 4.4 02/11/2022    CO2 29.0 02/11/2022    CALCIUM 9.6 02/11/2022    ALBUMIN 4.40 02/11/2022    AST 25 02/11/2022    ALT 25  02/11/2022       Assessment and Plan:    1. Obstructive sleep apnea - Established, stable (1)  1. Compliant with PAP therapy  2. Continue PAP as prescribed  3. Script for PAP supplies  4. Return to clinic in 1 year with compliance report unless change in symptoms in interim period  2. Morbid obesity - BMI 46.3 - stable chronic illness      I spent 15 minutes caring for Juliana on this date of service. This time includes time spent by me in the following activities: preparing for the visit, reviewing tests, obtaining and/or reviewing a separately obtained history, performing a medically appropriate examination and/or evaluation , counseling and educating the patient/family/caregiver, documenting information in the medical record and care coordination; discussing PAP therapy, PAP compliance and PAP maintenance    RTC in 12 months. Patient agrees to return sooner if changes in symptoms.        This document has been electronically signed by KAVITHA Herr on July 5, 2022 11:38 CDT          CC: Lidia Sahni APRN          No ref. provider found

## 2022-07-13 PROBLEM — M81.0 SENILE OSTEOPOROSIS: Status: ACTIVE | Noted: 2022-07-13

## 2022-07-14 DIAGNOSIS — M81.0 SENILE OSTEOPOROSIS: Primary | ICD-10-CM

## 2022-07-28 ENCOUNTER — TELEPHONE (OUTPATIENT)
Dept: ORTHOPEDIC SURGERY | Facility: CLINIC | Age: 77
End: 2022-07-28

## 2022-07-28 NOTE — TELEPHONE ENCOUNTER
Pt has decided against the prolia injections. She has weighed the side effects vs the benefits. She doesn't want to do them.

## 2022-08-03 ENCOUNTER — APPOINTMENT (OUTPATIENT)
Dept: ONCOLOGY | Facility: HOSPITAL | Age: 77
End: 2022-08-03

## 2022-10-17 DIAGNOSIS — E55.9 VITAMIN D DEFICIENCY: ICD-10-CM

## 2022-10-17 RX ORDER — ERGOCALCIFEROL 1.25 MG/1
CAPSULE ORAL
Qty: 12 CAPSULE | Refills: 3 | OUTPATIENT
Start: 2022-10-17

## 2022-10-17 NOTE — TELEPHONE ENCOUNTER
Rx Refill Note  Requested Prescriptions     Refused Prescriptions Disp Refills   • vitamin D (ERGOCALCIFEROL) 1.25 MG (18178 UT) capsule capsule [Pharmacy Med Name: Vitamin D (Ergocalciferol) 1.25 MG (62642 UT) Oral Capsule] 12 capsule 3     Sig: TAKE 1 CAPSULE BY MOUTH  EVERY 7 DAYS      Last office visit with prescribing clinician: 4/25/2022      Next office visit with prescribing clinician: 5/1/2023   {TIP  Encounters:    Pt to call office. Provider no longer at this practice.         Ayaka Joyce LPN  10/17/22, 08:28 CDT

## 2023-01-04 ENCOUNTER — OFFICE VISIT (OUTPATIENT)
Dept: FAMILY MEDICINE CLINIC | Facility: CLINIC | Age: 78
End: 2023-01-04
Payer: MEDICARE

## 2023-01-04 VITALS
SYSTOLIC BLOOD PRESSURE: 126 MMHG | WEIGHT: 274.4 LBS | HEIGHT: 64 IN | BODY MASS INDEX: 46.85 KG/M2 | DIASTOLIC BLOOD PRESSURE: 76 MMHG | OXYGEN SATURATION: 97 % | HEART RATE: 80 BPM

## 2023-01-04 DIAGNOSIS — I10 ESSENTIAL HYPERTENSION: ICD-10-CM

## 2023-01-04 DIAGNOSIS — M67.432 GANGLION CYST OF VOLAR ASPECT OF LEFT WRIST: ICD-10-CM

## 2023-01-04 DIAGNOSIS — N18.31 STAGE 3A CHRONIC KIDNEY DISEASE: ICD-10-CM

## 2023-01-04 DIAGNOSIS — R22.42 LOCALIZED SWELLING OF LEFT LOWER EXTREMITY: ICD-10-CM

## 2023-01-04 DIAGNOSIS — E11.9 TYPE 2 DIABETES MELLITUS WITHOUT COMPLICATION, WITHOUT LONG-TERM CURRENT USE OF INSULIN: ICD-10-CM

## 2023-01-04 DIAGNOSIS — E55.9 VITAMIN D DEFICIENCY: ICD-10-CM

## 2023-01-04 DIAGNOSIS — R06.09 DYSPNEA ON EXERTION: Primary | ICD-10-CM

## 2023-01-04 DIAGNOSIS — Z00.00 ENCOUNTER FOR MEDICAL EXAMINATION TO ESTABLISH CARE: ICD-10-CM

## 2023-01-04 DIAGNOSIS — E78.5 HYPERLIPIDEMIA, UNSPECIFIED HYPERLIPIDEMIA TYPE: ICD-10-CM

## 2023-01-04 DIAGNOSIS — Z13.29 SCREENING FOR THYROID DISORDER: ICD-10-CM

## 2023-01-04 PROCEDURE — 99215 OFFICE O/P EST HI 40 MIN: CPT

## 2023-01-04 PROCEDURE — 93005 ELECTROCARDIOGRAM TRACING: CPT

## 2023-01-04 PROCEDURE — 93010 ELECTROCARDIOGRAM REPORT: CPT | Performed by: INTERNAL MEDICINE

## 2023-01-04 RX ORDER — ALBUTEROL SULFATE 90 UG/1
AEROSOL, METERED RESPIRATORY (INHALATION) EVERY 4 HOURS
COMMUNITY

## 2023-01-04 NOTE — PROGRESS NOTES
Chief Complaint  Establish Care    Subjective          Juliana Alcazar presents to [facility] PRIMARY CARE - [place]    Past Medical History:   Diagnosis Date   • Acute sinusitis    • Anorectal fistula     Anorectal fistula - status post repair      • Atrial fibrillation (HCC)    • Backache    • Carpal tunnel syndrome    • Chest pain, non-cardiac    • Degenerative joint disease involving multiple joints    • Depressive disorder    • Diarrhea    • Dyspnea    • Electrocardiogram abnormal    • Essential hypertension    • Female stress incontinence    • Generalized anxiety disorder    • GERD (gastroesophageal reflux disease)    • H/O tubal ligation    • Hemorrhoids    • Hypercholesterolemia    • Hyperlipidemia    • Hypertensive disorder    • Low back pain     C/O - low back pain      • Normal gynecologic examination    • Obesity    • Otitis media, left    • Palpitations     a   • Primary fibromyalgia syndrome    • Sleep apnea    • Tachycardia    • Type 2 diabetes mellitus (HCC)       Past Surgical History:   Procedure Laterality Date   • BARIATRIC SURGERY     • BREAST BIOPSY     • CARDIAC ABLATION     • COLONOSCOPY N/A 3/31/2017    Procedure: COLONOSCOPY;  Surgeon: Pan Muller MD;  Location: [facility] ENDOSCOPY;  Service:    • ENDOSCOPY N/A 3/31/2017    Procedure: ESOPHAGOGASTRODUODENOSCOPY dilation;  Surgeon: Pan Muller MD;  Location: [facility] ENDOSCOPY;  Service:    • ENDOSCOPY AND COLONOSCOPY  09/27/2000     A single small sessile polyp was seen in the rectum which measured 1 mm.211.3 External hemorrhoids were present. 455.3    • INJECTION OF MEDICATION  01/07/2016    Celestone (betamethasone) (2)        • JOINT REPLACEMENT      right knee 2008   • KNEE ARTHROSCOPY  07/09/2008     Right total knee atthroplasty. Osteoarthritis of the right knee.    • STOMACH SURGERY  1976    Revise stomach-bowel fusion (1)    history of jejunal surgery    • TONSILLECTOMY     • TUBAL ABDOMINAL  LIGATION     • UPPER GASTROINTESTINAL ENDOSCOPY  03/31/2017        History of Present Illness    77-year-old female who presents office today to establish care.  Patient's previous PCP: KAVITHA Snow.  Last office visit with prior PCP: 4/25/2022, last routine labs February 2022.  Patient has significant history for diabetes, hyperlipidemia, and CKD stage 3a, based on last labs.  Patient denies need for refills at this time.    Patient reports shortness of breath on exertion, mild dizziness with standing quickly, and right lower extremity edema.  Patient reports lower extremity edema has been intermittent for 1 year or more.  Patient states that shortness of breath with activity has gradually worsened over the last 6 months.  Patient sees Dr. Juan Nielsen, electrophysiologist at [facility] [place].  Patient states last visit was June 2022.  Patient sees electrophysiologist for history of paroxysmal A. fib.  Patient has had pacemaker placed after undergoing 2 separate ablations and 2 separate cardioversions in her history.  Patient currently takes Xarelto for PE/DVT prophylaxis related to arrhythmia.  Patient believes it has been 1.5 years since last echo.    Patient complains of mass on volar aspect of left wrist. Operative scar noted superior to mass.  Patient states mass has been present for months, however, in last few weeks it has grown in size and when compressed causes numbness to 1st and 2nd digit.    Review of Systems   Constitutional: Positive for activity change and fatigue.   HENT: Negative.    Eyes: Negative.    Respiratory: Positive for shortness of breath (with activity). Negative for cough, chest tightness and wheezing.    Cardiovascular: Positive for leg swelling ( intermittent). Negative for chest pain and palpitations.   Gastrointestinal: Negative.  Negative for constipation and diarrhea.   Endocrine: Negative.    Musculoskeletal:        L wrist pain (when mass compressed)   Neurological:  Positive for dizziness and light-headedness.   Psychiatric/Behavioral: Negative.         Objective   Vital Signs:   /76 (BP Location: Left arm, Patient Position: Sitting, Cuff Size: Adult)   Pulse 80   Ht 162.6 cm (64.02\")   Wt 124 kg (274 lb 6.4 oz)   SpO2 97%   BMI 47.07 kg/m²       Physical Exam  Vitals reviewed.   Constitutional:       General: She is not in acute distress.     Appearance: Normal appearance. She is not ill-appearing.      Comments: BMI: 47.07   HENT:      Right Ear: Tympanic membrane, ear canal and external ear normal.      Left Ear: Tympanic membrane, ear canal and external ear normal.      Nose: Nose normal.      Mouth/Throat:      Mouth: Mucous membranes are moist.      Pharynx: Oropharynx is clear.   Eyes:      General:         Right eye: No discharge.         Left eye: No discharge.      Pupils: Pupils are equal, round, and reactive to light.   Cardiovascular:      Rate and Rhythm: Normal rate. Rhythm regularly irregular.   Pulmonary:      Effort: Pulmonary effort is normal. No respiratory distress.      Breath sounds: Normal breath sounds. No stridor. No wheezing, rhonchi or rales.   Chest:      Chest wall: No tenderness.   Abdominal:      General: Abdomen is flat. Bowel sounds are normal. There is no distension.      Palpations: Abdomen is soft. There is no mass.      Tenderness: There is no abdominal tenderness.      Hernia: No hernia is present.   Musculoskeletal:        Arms:       Cervical back: Normal range of motion and neck supple. No tenderness.      Right lower le+ Pitting Edema present.      Left lower le+ Pitting Edema present.      Comments: Left wrist: mass inferior to surgical scar   Lymphadenopathy:      Cervical: No cervical adenopathy.   Skin:     General: Skin is warm and dry.   Neurological:      General: No focal deficit present.      Mental Status: She is alert and oriented to person, place, and time. Mental status is at baseline.      Gait: Gait  normal.      Comments: Numbness reported with compression located on the volar aspect of left wrist.   Psychiatric:         Mood and Affect: Mood normal.         Behavior: Behavior normal.         Thought Content: Thought content normal.         Judgment: Judgment normal.          Result Review :     Common labs    Common Labs 2/11/22 2/11/22 2/11/22 2/11/22    0933 0933 0933 0933   Glucose  177 (A)     BUN  16     Creatinine  1.17 (A)     eGFR Non African Am  45 (A)     Sodium  138     Potassium  4.4     Chloride  100     Calcium  9.6     Albumin  4.40     Total Bilirubin  0.6     Alkaline Phosphatase  71     AST (SGOT)  25     ALT (SGPT)  25     WBC 6.22      Hemoglobin 14.1      Hematocrit 43.0      Platelets 313      Total Cholesterol   156    Triglycerides   324 (A)    HDL Cholesterol   26 (A)    LDL Cholesterol    77    Hemoglobin A1C    7.50 (A)   (A) Abnormal value            Data reviewed: Cardiology studies EKG in office, forwarded for cardiology interpretation.   My interpretation: atrial flutter 2:1 LBBB, Ventricular rate: 80 bpm, no acute ST changes. No pacing noted at this time.  Hx: patient has had placement of pacer after 2 ablations and 2 cardioversions.  Sees Dr. Juan Nielsen, Electrophysiologist at [facility].         Assessment and Plan    Diagnoses and all orders for this visit:    1. Dyspnea on exertion (Primary)  -     ECG 12 Lead  -     BNP; Future  -     Cancel: XR Chest PA & Lateral  -     XR Chest PA & Lateral; Future    2. Essential hypertension  -     CBC & Differential; Future  -     Comprehensive Metabolic Panel; Future  - Continue lisinopril 20 mg daily.    3. Hyperlipidemia, unspecified hyperlipidemia type  -     Lipid Panel; Future  - Continue atorvastatin 40 mg nightly and fenofibrate 54 mg daily.    4. Stage 3a chronic kidney disease (HCC)        - CMP, will refer as warranted to nephrology after update of labs.    5. Type 2 diabetes mellitus without complication,  without long-term current use of insulin (HCC)  -     Hemoglobin A1c; Future  - Continue metformin    6. Vitamin D deficiency  -     Vitamin D,25-Hydroxy; Future    7. Screening for thyroid disorder  -     TSH Rfx On Abnormal To Free T4; Future    8. Localized swelling of left lower extremity  -     Cancel: XR Chest PA & Lateral  -     XR Chest PA & Lateral; Future    9. Ganglion cyst of volar aspect of left wrist  -     Ambulatory Referral to Orthopedic Surgery    10. Paroxysmal A. Fib        - Keep follow up with electrophysiologist        - Continue metoprolol 50 mg BID.        - Continue Xarelto 20 mg daily.    11. Encounter for medical examination to establish care      -Update CMP, consider referral to nephrology if warranted by eGFR.  -CXR pending, labs pending. Suspicious of heart failure. Will call patient with results.  Further orders, possible cardiology consult, as warranted by results.  -EKG in office, send for cardiology interpretation.  -Patient plans to call Electrophysiologist for follow-up appointment r/t afib and to discuss when next scheduled echo is.     -Discussed plan of care with patient.    -Answered all of patient's questions.  -Patient agreeable to plan of care.    -Will repeat CMP in 4 weeks.    I spent 41 minutes caring for Juliana on this date of service. This time includes time spent by me in the following activities:preparing for the visit, reviewing tests, performing a medically appropriate examination and/or evaluation , counseling and educating the patient/family/caregiver, ordering medications, tests, or procedures, referring and communicating with other health care professionals , documenting information in the medical record and independently interpreting results and communicating that information with the patient/family/caregiver  Follow Up   Return in about 3 months (around 4/4/2023) for DM recheck in 3 months, AWV will be due 4/25/2023.  Patient was given instructions and  counseling regarding her condition or for health maintenance advice. Please see specific information pulled into the AVS if appropriate.         This document has been electronically signed by KAVITHA Mejias on January 18, 2023 12:40 CST,.

## 2023-01-09 LAB
QT INTERVAL: 416 MS
QTC INTERVAL: 479 MS

## 2023-01-10 ENCOUNTER — TELEPHONE (OUTPATIENT)
Dept: FAMILY MEDICINE CLINIC | Facility: CLINIC | Age: 78
End: 2023-01-10
Payer: MEDICARE

## 2023-01-10 NOTE — TELEPHONE ENCOUNTER
Notified patient she said she was going to go get xray and bw yesterday and didn't have a ride. So she is going to see cardiologist tomorrow 1/11/23 and said if they dont do bw there she will go Thursday to have the xray and bw done

## 2023-01-10 NOTE — TELEPHONE ENCOUNTER
Patient to be referred to ortho - Dr. Waggoner for ganglion cyst on wrist.     Still waiting for her to have xray and labs done for work up.     Needing update from patient if she spoke with her cardiologist.

## 2023-01-12 ENCOUNTER — LAB (OUTPATIENT)
Dept: LAB | Facility: HOSPITAL | Age: 78
End: 2023-01-12
Payer: MEDICARE

## 2023-01-12 DIAGNOSIS — E11.9 TYPE 2 DIABETES MELLITUS WITHOUT COMPLICATION, WITHOUT LONG-TERM CURRENT USE OF INSULIN: ICD-10-CM

## 2023-01-12 DIAGNOSIS — E55.9 VITAMIN D DEFICIENCY: ICD-10-CM

## 2023-01-12 DIAGNOSIS — E78.5 HYPERLIPIDEMIA, UNSPECIFIED HYPERLIPIDEMIA TYPE: ICD-10-CM

## 2023-01-12 DIAGNOSIS — R06.09 DYSPNEA ON EXERTION: ICD-10-CM

## 2023-01-12 DIAGNOSIS — Z13.29 SCREENING FOR THYROID DISORDER: ICD-10-CM

## 2023-01-12 DIAGNOSIS — I10 ESSENTIAL HYPERTENSION: ICD-10-CM

## 2023-01-12 LAB
25(OH)D3 SERPL-MCNC: 29.6 NG/ML (ref 30–100)
ALBUMIN SERPL-MCNC: 4 G/DL (ref 3.5–5.2)
ALBUMIN/GLOB SERPL: 1.3 G/DL
ALP SERPL-CCNC: 56 U/L (ref 39–117)
ALT SERPL W P-5'-P-CCNC: 24 U/L (ref 1–33)
ANION GAP SERPL CALCULATED.3IONS-SCNC: 8.9 MMOL/L (ref 5–15)
AST SERPL-CCNC: 23 U/L (ref 1–32)
BASOPHILS # BLD AUTO: 0.04 10*3/MM3 (ref 0–0.2)
BASOPHILS NFR BLD AUTO: 0.6 % (ref 0–1.5)
BILIRUB SERPL-MCNC: 0.4 MG/DL (ref 0–1.2)
BUN SERPL-MCNC: 16 MG/DL (ref 8–23)
BUN/CREAT SERPL: 15.8 (ref 7–25)
CALCIUM SPEC-SCNC: 9.8 MG/DL (ref 8.6–10.5)
CHLORIDE SERPL-SCNC: 105 MMOL/L (ref 98–107)
CHOLEST SERPL-MCNC: 159 MG/DL (ref 0–200)
CO2 SERPL-SCNC: 27.1 MMOL/L (ref 22–29)
CREAT SERPL-MCNC: 1.01 MG/DL (ref 0.57–1)
DEPRECATED RDW RBC AUTO: 39.5 FL (ref 37–54)
EGFRCR SERPLBLD CKD-EPI 2021: 57.8 ML/MIN/1.73
EOSINOPHIL # BLD AUTO: 0.24 10*3/MM3 (ref 0–0.4)
EOSINOPHIL NFR BLD AUTO: 3.8 % (ref 0.3–6.2)
ERYTHROCYTE [DISTWIDTH] IN BLOOD BY AUTOMATED COUNT: 12.2 % (ref 12.3–15.4)
GLOBULIN UR ELPH-MCNC: 3.2 GM/DL
GLUCOSE SERPL-MCNC: 162 MG/DL (ref 65–99)
HBA1C MFR BLD: 7.5 % (ref 4.8–5.6)
HCT VFR BLD AUTO: 39.4 % (ref 34–46.6)
HDLC SERPL-MCNC: 31 MG/DL (ref 40–60)
HGB BLD-MCNC: 13.1 G/DL (ref 12–15.9)
IMM GRANULOCYTES # BLD AUTO: 0.01 10*3/MM3 (ref 0–0.05)
IMM GRANULOCYTES NFR BLD AUTO: 0.2 % (ref 0–0.5)
LDLC SERPL CALC-MCNC: 92 MG/DL (ref 0–100)
LDLC/HDLC SERPL: 2.77 {RATIO}
LYMPHOCYTES # BLD AUTO: 2.05 10*3/MM3 (ref 0.7–3.1)
LYMPHOCYTES NFR BLD AUTO: 32.4 % (ref 19.6–45.3)
MCH RBC QN AUTO: 29.2 PG (ref 26.6–33)
MCHC RBC AUTO-ENTMCNC: 33.2 G/DL (ref 31.5–35.7)
MCV RBC AUTO: 87.8 FL (ref 79–97)
MONOCYTES # BLD AUTO: 0.48 10*3/MM3 (ref 0.1–0.9)
MONOCYTES NFR BLD AUTO: 7.6 % (ref 5–12)
NEUTROPHILS NFR BLD AUTO: 3.51 10*3/MM3 (ref 1.7–7)
NEUTROPHILS NFR BLD AUTO: 55.4 % (ref 42.7–76)
NRBC BLD AUTO-RTO: 0.2 /100 WBC (ref 0–0.2)
NT-PROBNP SERPL-MCNC: 564 PG/ML (ref 0–1800)
PLATELET # BLD AUTO: 261 10*3/MM3 (ref 140–450)
PMV BLD AUTO: 9 FL (ref 6–12)
POTASSIUM SERPL-SCNC: 4.3 MMOL/L (ref 3.5–5.2)
PROT SERPL-MCNC: 7.2 G/DL (ref 6–8.5)
RBC # BLD AUTO: 4.49 10*6/MM3 (ref 3.77–5.28)
SODIUM SERPL-SCNC: 141 MMOL/L (ref 136–145)
TRIGL SERPL-MCNC: 211 MG/DL (ref 0–150)
TSH SERPL DL<=0.05 MIU/L-ACNC: 1.86 UIU/ML (ref 0.27–4.2)
VLDLC SERPL-MCNC: 36 MG/DL (ref 5–40)
WBC NRBC COR # BLD: 6.33 10*3/MM3 (ref 3.4–10.8)

## 2023-01-12 PROCEDURE — 82306 VITAMIN D 25 HYDROXY: CPT

## 2023-01-12 PROCEDURE — 85025 COMPLETE CBC W/AUTO DIFF WBC: CPT

## 2023-01-12 PROCEDURE — 80061 LIPID PANEL: CPT

## 2023-01-12 PROCEDURE — 83036 HEMOGLOBIN GLYCOSYLATED A1C: CPT

## 2023-01-12 PROCEDURE — 80053 COMPREHEN METABOLIC PANEL: CPT

## 2023-01-12 PROCEDURE — 84443 ASSAY THYROID STIM HORMONE: CPT

## 2023-01-12 PROCEDURE — 83880 ASSAY OF NATRIURETIC PEPTIDE: CPT

## 2023-01-18 DIAGNOSIS — I48.0 PAROXYSMAL ATRIAL FIBRILLATION: Primary | ICD-10-CM

## 2023-01-18 DIAGNOSIS — E11.649 TYPE 2 DIABETES MELLITUS WITH HYPOGLYCEMIA WITHOUT COMA, WITHOUT LONG-TERM CURRENT USE OF INSULIN: ICD-10-CM

## 2023-01-18 DIAGNOSIS — R22.42 LOCALIZED SWELLING OF LEFT LOWER EXTREMITY: ICD-10-CM

## 2023-01-18 DIAGNOSIS — N18.31 STAGE 3A CHRONIC KIDNEY DISEASE: ICD-10-CM

## 2023-01-18 RX ORDER — FUROSEMIDE 20 MG/1
20 TABLET ORAL DAILY
Qty: 30 TABLET | Refills: 0 | Status: SHIPPED | OUTPATIENT
Start: 2023-01-18 | End: 2023-02-07

## 2023-01-18 RX ORDER — DAPAGLIFLOZIN 5 MG/1
5 TABLET, FILM COATED ORAL DAILY
Qty: 30 TABLET | Refills: 0 | Status: SHIPPED | OUTPATIENT
Start: 2023-01-18 | End: 2023-02-07

## 2023-01-18 NOTE — PROGRESS NOTES
Discussed results with patient.  Will start on Farxiga 5 mg daily, will add lasix 20 mg daily due to edema.  Also refer to nephrology for decreased GFR.  Keep appointments with cardiology. Patient states already requested lab results to be copied to cardiologist. Follow up in 1 month.

## 2023-01-18 NOTE — PROGRESS NOTES
Patient spoke with cardiologist.  States he does not believe the shortness of breath is related to cardiac issues/afib. Patient states she requested copy of labs sent to cardiologist.  At this time will start on lasix for edema.  Will also start on farxiga 5mg daily for improved glycemic control, with possible renal benefits.      GFR 57.8, creatinine 1.01.  Will refer to nephrology.    -Discussed plan of care with patient.    -Answered all of patient's questions.  -Patient agreeable to plan of care.

## 2023-02-07 DIAGNOSIS — N18.31 STAGE 3A CHRONIC KIDNEY DISEASE: ICD-10-CM

## 2023-02-07 DIAGNOSIS — R22.42 LOCALIZED SWELLING OF LEFT LOWER EXTREMITY: ICD-10-CM

## 2023-02-07 DIAGNOSIS — E11.649 TYPE 2 DIABETES MELLITUS WITH HYPOGLYCEMIA WITHOUT COMA, WITHOUT LONG-TERM CURRENT USE OF INSULIN: ICD-10-CM

## 2023-02-07 RX ORDER — FUROSEMIDE 20 MG/1
20 TABLET ORAL DAILY
Qty: 30 TABLET | Refills: 11 | Status: SHIPPED | OUTPATIENT
Start: 2023-02-07 | End: 2023-02-15 | Stop reason: SDUPTHER

## 2023-02-07 RX ORDER — DAPAGLIFLOZIN 5 MG/1
TABLET, FILM COATED ORAL
Qty: 30 TABLET | Refills: 11 | Status: SHIPPED | OUTPATIENT
Start: 2023-02-07 | End: 2023-02-15 | Stop reason: SDUPTHER

## 2023-02-15 ENCOUNTER — OFFICE VISIT (OUTPATIENT)
Dept: FAMILY MEDICINE CLINIC | Facility: CLINIC | Age: 78
End: 2023-02-15
Payer: MEDICARE

## 2023-02-15 VITALS
SYSTOLIC BLOOD PRESSURE: 128 MMHG | HEART RATE: 83 BPM | WEIGHT: 273 LBS | BODY MASS INDEX: 46.61 KG/M2 | OXYGEN SATURATION: 96 % | TEMPERATURE: 96.2 F | HEIGHT: 64 IN | DIASTOLIC BLOOD PRESSURE: 62 MMHG

## 2023-02-15 DIAGNOSIS — E11.65 TYPE 2 DIABETES MELLITUS WITH HYPERGLYCEMIA, WITHOUT LONG-TERM CURRENT USE OF INSULIN: ICD-10-CM

## 2023-02-15 DIAGNOSIS — E11.649 TYPE 2 DIABETES MELLITUS WITH HYPOGLYCEMIA WITHOUT COMA, WITHOUT LONG-TERM CURRENT USE OF INSULIN: ICD-10-CM

## 2023-02-15 DIAGNOSIS — R22.32 MASS OF LEFT WRIST: ICD-10-CM

## 2023-02-15 DIAGNOSIS — E78.5 HYPERLIPIDEMIA, UNSPECIFIED HYPERLIPIDEMIA TYPE: Primary | ICD-10-CM

## 2023-02-15 DIAGNOSIS — N18.31 STAGE 3A CHRONIC KIDNEY DISEASE: ICD-10-CM

## 2023-02-15 DIAGNOSIS — R22.42 LOCALIZED SWELLING OF LEFT LOWER EXTREMITY: ICD-10-CM

## 2023-02-15 PROCEDURE — 99214 OFFICE O/P EST MOD 30 MIN: CPT

## 2023-02-15 RX ORDER — FUROSEMIDE 20 MG/1
20 TABLET ORAL DAILY
Qty: 90 TABLET | Refills: 1 | Status: SHIPPED | OUTPATIENT
Start: 2023-02-15

## 2023-02-15 RX ORDER — DAPAGLIFLOZIN 5 MG/1
5 TABLET, FILM COATED ORAL DAILY
Qty: 90 TABLET | Refills: 1 | Status: SHIPPED | OUTPATIENT
Start: 2023-02-15

## 2023-02-15 NOTE — PROGRESS NOTES
Chief Complaint  Follow-up CKD and lower extremity swelling    Subjective          Juliana Alcazar presents to [facility]IMARY [facility] - [place]    Past Medical History:   Diagnosis Date   • Acute sinusitis    • Anorectal fistula     Anorectal fistula - status post repair      • Atrial fibrillation (HCC)    • Backache    • Carpal tunnel syndrome    • Chest pain, non-cardiac    • Degenerative joint disease involving multiple joints    • Depressive disorder    • Diarrhea    • Dyspnea    • Electrocardiogram abnormal    • Essential hypertension    • Female stress incontinence    • Generalized anxiety disorder    • GERD (gastroesophageal reflux disease)    • H/O tubal ligation    • Hemorrhoids    • Hypercholesterolemia    • Hyperlipidemia    • Hypertensive disorder    • Low back pain     C/O - low back pain      • Normal gynecologic examination    • Obesity    • Otitis media, left    • Palpitations     a   • Primary fibromyalgia syndrome    • Sleep apnea    • Tachycardia    • Type 2 diabetes mellitus (HCC)       Past Surgical History:   Procedure Laterality Date   • BARIATRIC SURGERY     • BREAST BIOPSY     • CARDIAC ABLATION     • COLONOSCOPY N/A 3/31/2017    Procedure: COLONOSCOPY;  Surgeon: Pan Muller MD;  Location: [facility] ENDOSCOPY;  Service:    • ENDOSCOPY N/A 3/31/2017    Procedure: ESOPHAGOGASTRODUODENOSCOPY dilation;  Surgeon: Pan Muller MD;  Location: [facility] ENDOSCOPY;  Service:    • ENDOSCOPY AND COLONOSCOPY  09/27/2000     A single small sessile polyp was seen in the rectum which measured 1 mm.211.3 External hemorrhoids were present. 455.3    • INJECTION OF MEDICATION  01/07/2016    Celestone (betamethasone) (2)        • JOINT REPLACEMENT      right knee 2008   • KNEE ARTHROSCOPY  07/09/2008     Right total knee atthroplasty. Osteoarthritis of the right knee.    • STOMACH SURGERY  1976    Revise stomach-bowel fusion (1)    history of jejunal surgery    • TONSILLECTOMY      • TUBAL ABDOMINAL LIGATION     • UPPER GASTROINTESTINAL ENDOSCOPY  03/31/2017        Current Outpatient Medications   Medication Instructions   • albuterol sulfate  (90 Base) MCG/ACT inhaler Every 4 Hours   • atorvastatin (LIPITOR) 40 mg, Oral, Daily   • Farxiga 5 mg, Oral, Daily   • fenofibrate (TRICOR) 54 mg, Oral, Daily   • furosemide (LASIX) 20 mg, Oral, Daily   • lisinopril (PRINIVIL,ZESTRIL) 20 mg, Oral, Daily   • metFORMIN (GLUCOPHAGE) 500 mg, Oral, 2 Times Daily With Meals   • metoprolol tartrate (LOPRESSOR) 50 mg, Oral, 2 Times Daily   • oxyCODONE-acetaminophen (PERCOCET)  MG per tablet 1 tablet, Oral, Every 6 Hours PRN   • rivaroxaban (XARELTO) 20 mg, Oral, Daily   • sertraline (ZOLOFT) 100 MG tablet TAKE 1 TABLET BY MOUTH IN  THE MORNING AND ONE-HALF  TABLET BY MOUTH IN THE  EVENING   • vitamin D (ERGOCALCIFEROL) 50,000 Units, Oral, Every 7 Days      Allergies   Allergen Reactions   • Adhesive Tape    • Celebrex [Celecoxib]    • Darvon [Propoxyphene]    • Demerol [Meperidine]    • Dilantin [Phenytoin]    • Dilaudid [Hydromorphone Hcl]    • Iodinated Contrast Media    • Latex    • Morphine And Related    • Nsaids    • Penicillins Swelling     Patient had a reaction to penicillin in 2010.  Reaction included hives and swelling.  Patient states she has tolerated cephalexin in the past.   • Phenergan [Promethazine]    • Vioxx [Rofecoxib]    • Vistaril [Hydroxyzine Hcl]         History of Present Illness  Ms. Alcazar presents to office for follow-up visit related to swelling and difficulty breathing.  Patient has had follow-up visits with cardiology recommending continuation of Lasix.  Patient denies any muscle aches/cramps, dizziness, or feeling faint.  Patient states urination has significantly increased since starting Lasix and Farxiga.  Patient has been referred to nephrology due to CKD stage III.  Patient is tolerating Farxiga urination is significant due to starting Farxiga and  "Lasix.    Patient states she was recently seen by the orthopedic surgeon that performed surgery on her left wrist.  Attempt to drain fluid where mass was located.  No fluid removed, mass not fluid-filled.  If able to receive permission from electrophysiologist to hold Xarelto for 5 days we will proceed with surgical removal.    Patient states fenofibrate was reordered in the past, wanting to know if she still needs to stay on it.  Currently taking Lipitor 40 mg for hyperlipidemia.  Triglycerides still slightly elevated, however, recently started on Farxiga.  We will consider reordering fenofibrate if necessary after next lipid panel prior to next visit.    Review of Systems   Constitutional: Negative.  Negative for chills, fatigue and fever.   HENT: Negative.    Eyes: Negative.    Respiratory: Negative.  Negative for shortness of breath.    Cardiovascular: Positive for leg swelling. Negative for chest pain and palpitations.   Gastrointestinal: Negative.  Negative for constipation and diarrhea.   Endocrine: Negative.    Genitourinary: Positive for frequency ( Related to starting Lasix and Farxiga).   Musculoskeletal: Negative.    Skin: Negative.    Neurological: Negative.    Psychiatric/Behavioral: Negative.         Objective   Vital Signs:   /62 (BP Location: Left arm, Patient Position: Sitting, Cuff Size: Large Adult)   Pulse 83   Temp 96.2 °F (35.7 °C)   Ht 162.6 cm (64.02\")   Wt 124 kg (273 lb)   SpO2 96%   BMI 46.83 kg/m²       Physical Exam   Vitals reviewed.   Constitutional:       General: She is not in acute distress.     Appearance: Normal appearance. She is not ill-appearing.      Comments: BMI: 46.83   HENT:      Right Ear: Tympanic membrane, ear canal and external ear normal.      Left Ear: Tympanic membrane, ear canal and external ear normal.      Nose: Nose normal.      Mouth/Throat:      Mouth: Mucous membranes are moist.      Pharynx: Oropharynx is clear.   Eyes:      General:         " Right eye: No discharge.         Left eye: No discharge.      Pupils: Pupils are equal, round, and reactive to light.   Cardiovascular:      Rate and Rhythm: Normal rate.  Regular rhythm.  Pulmonary:      Effort: Pulmonary effort is normal. No respiratory distress.      Breath sounds: Normal breath sounds. No stridor. No wheezing, rhonchi or rales.   Chest:      Chest wall: No tenderness.   Abdominal:      General: Abdomen is flat. Bowel sounds are normal. There is no distension.      Palpations: Abdomen is soft. There is no mass.      Tenderness: There is no abdominal tenderness.      Hernia: No hernia is present.   Musculoskeletal:        Arms:       Cervical back: Normal range of motion and neck supple. No tenderness.      Right lower leg: Trace Edema present.      Left lower leg: Trace edema present.      Comments: Left wrist: mass inferior to surgical scar   Lymphadenopathy:      Cervical: No cervical adenopathy.   Skin:     General: Skin is warm and dry.   Neurological:      General: No focal deficit present.      Mental Status: She is alert and oriented to person, place, and time. Mental status is at baseline.      Gait: Gait normal.      Comments: Numbness reported with compression located on the volar aspect of left wrist.   Psychiatric:         Mood and Affect: Mood normal.         Behavior: Behavior normal.         Thought Content: Thought content normal.         Judgment: Judgment normal.     Result Review :     Common labs    Common Labs 1/12/23 1/12/23 1/12/23 1/12/23    0958 0958 0958 0958   Glucose   162 (A)    BUN   16    Creatinine   1.01 (A)    Sodium   141    Potassium   4.3    Chloride   105    Calcium   9.8    Albumin   4.0    Total Bilirubin   0.4    Alkaline Phosphatase   56    AST (SGOT)   23    ALT (SGPT)   24    WBC 6.33      Hemoglobin 13.1      Hematocrit 39.4      Platelets 261      Total Cholesterol    159   Triglycerides    211 (A)   HDL Cholesterol    31 (A)   LDL Cholesterol     92    Hemoglobin A1C  7.50 (A)     (A) Abnormal value                     Assessment and Plan    Diagnoses and all orders for this visit:    1. Hyperlipidemia, unspecified hyperlipidemia type (Primary)  -     Lipid Panel; Future    2. Stage 3a chronic kidney disease (HCC)  -     furosemide (LASIX) 20 MG tablet; Take 1 tablet by mouth Daily.  Dispense: 90 tablet; Refill: 1  -     dapagliflozin (Farxiga) 5 MG tablet tablet; Take 1 tablet by mouth Daily.  Dispense: 90 tablet; Refill: 1  -     Comprehensive metabolic panel; Future  -     MicroAlbumin, Urine, Random - Urine, Clean Catch; Future    3. Type 2 diabetes mellitus with hypoglycemia without coma, without long-term current use of insulin (HCC)  -     furosemide (LASIX) 20 MG tablet; Take 1 tablet by mouth Daily.  Dispense: 90 tablet; Refill: 1  -     Hemoglobin A1c; Future  -     MicroAlbumin, Urine, Random - Urine, Clean Catch; Future    4. Localized swelling of left lower extremity  -     furosemide (LASIX) 20 MG tablet; Take 1 tablet by mouth Daily.  Dispense: 90 tablet; Refill: 1  -     Comprehensive metabolic panel; Future    5. Mass of left wrist    6. Type 2 diabetes mellitus with hyperglycemia, without long-term current use of insulin (HCC)  -     dapagliflozin (Farxiga) 5 MG tablet tablet; Take 1 tablet by mouth Daily.  Dispense: 90 tablet; Refill: 1    -Keep routine as scheduled.  -Plan to establish with nephrology on May 8, 2023.  -Follow-up in 2 months for recheck of kidney function, lipids, and A1c.  -Refill Farxiga and Lasix with 90-day supplies per insurance request.  Patient has been tolerating medications well.  We will continue with current dosage consider increase of Farxiga depending on A1c and further assessment of frequent urination at next visit.  -She is notify this NP if decrease in urine output or darkening of color of urine.  -Patient to notify this NP when operation for removing neuroma of left wrists is scheduled.    -Follow-up will  consider whether or not to restart fenofibrate.  Currently to continue on atorvastatin May take over-the-counter fish oil or Krill oil.  Lipids at next lab check to reassess for improvement in triglycerides after starting Farxiga.  -Patient could possibly take fish oil or Krill oil over-the-counter for potential improved triglyceride management.  Patient concerned about side effects of fish oil, may try Krill oil instead.  Decided to recheck lipids prior to next visit, will discuss at next visit whether or not to restart fenofibrate.Physical Exam    -Discussed plan of care with patient.    -Answered all of patient's questions.  -Patient agreeable to plan of care.    EMR Dragon/Transcription Disclaimer: Some of this note may be an electronic transcription/translation of spoken language to printed text.  The electronic translation of spoken language may permit erroneous, or at times, nonsensical words or phrases to be inadvertently transcribed. Although I have reviewed the note for such errors, some may still exist.     Follow Up   Return in about 2 months (around 4/15/2023).  Patient was given instructions and counseling regarding her condition or for health maintenance advice. Please see specific information pulled into the AVS if appropriate.         This document has been electronically signed by KAVITHA Mejias on March 19, 2023 16:40 CDT,.

## 2023-03-08 DIAGNOSIS — F32.A DEPRESSION, UNSPECIFIED DEPRESSION TYPE: ICD-10-CM

## 2023-03-08 RX ORDER — SERTRALINE HYDROCHLORIDE 100 MG/1
TABLET, FILM COATED ORAL
Qty: 135 TABLET | Refills: 3 | Status: SHIPPED | OUTPATIENT
Start: 2023-03-08

## 2023-04-05 ENCOUNTER — OFFICE VISIT (OUTPATIENT)
Dept: FAMILY MEDICINE CLINIC | Facility: CLINIC | Age: 78
End: 2023-04-05
Payer: MEDICARE

## 2023-04-05 VITALS
BODY MASS INDEX: 46.95 KG/M2 | WEIGHT: 275 LBS | DIASTOLIC BLOOD PRESSURE: 62 MMHG | HEIGHT: 64 IN | HEART RATE: 80 BPM | SYSTOLIC BLOOD PRESSURE: 126 MMHG | OXYGEN SATURATION: 97 % | TEMPERATURE: 96 F

## 2023-04-05 DIAGNOSIS — E11.65 TYPE 2 DIABETES MELLITUS WITH HYPERGLYCEMIA, WITHOUT LONG-TERM CURRENT USE OF INSULIN: Primary | ICD-10-CM

## 2023-04-05 DIAGNOSIS — N18.31 STAGE 3A CHRONIC KIDNEY DISEASE: ICD-10-CM

## 2023-04-05 DIAGNOSIS — I10 ESSENTIAL HYPERTENSION: ICD-10-CM

## 2023-04-05 DIAGNOSIS — I48.0 PAROXYSMAL ATRIAL FIBRILLATION: ICD-10-CM

## 2023-04-05 DIAGNOSIS — E78.2 MIXED HYPERLIPIDEMIA: ICD-10-CM

## 2023-04-05 LAB
EXPIRATION DATE: ABNORMAL
HBA1C MFR BLD: 6.5 %
Lab: ABNORMAL

## 2023-04-05 NOTE — PROGRESS NOTES
Chief Complaint  Follow-up  T2DM and CKD    Subjective          Juliaan Alcazar presents to [facility] - [place]    Past Medical History:   Diagnosis Date   • Acute sinusitis    • Anorectal fistula     Anorectal fistula - status post repair      • Atrial fibrillation    • Backache    • Carpal tunnel syndrome    • Chest pain, non-cardiac    • Degenerative joint disease involving multiple joints    • Depressive disorder    • Diarrhea    • Dyspnea    • Electrocardiogram abnormal    • Essential hypertension    • Female stress incontinence    • Generalized anxiety disorder    • GERD (gastroesophageal reflux disease)    • H/O tubal ligation    • Hemorrhoids    • Hypercholesterolemia    • Hyperlipidemia    • Hypertensive disorder    • Low back pain     C/O - low back pain      • Normal gynecologic examination    • Obesity    • Otitis media, left    • Palpitations     a   • Primary fibromyalgia syndrome    • Sleep apnea    • Tachycardia    • Type 2 diabetes mellitus       Past Surgical History:   Procedure Laterality Date   • BARIATRIC SURGERY     • BREAST BIOPSY     • CARDIAC ABLATION     • COLONOSCOPY N/A 3/31/2017    Procedure: COLONOSCOPY;  Surgeon: Pan Muller MD;  Location: [facility] ENDOSCOPY;  Service:    • ENDOSCOPY N/A 3/31/2017    Procedure: ESOPHAGOGASTRODUODENOSCOPY dilation;  Surgeon: Pan Muller MD;  Location: [facility] ENDOSCOPY;  Service:    • ENDOSCOPY AND COLONOSCOPY  09/27/2000     A single small sessile polyp was seen in the rectum which measured 1 mm.211.3 External hemorrhoids were present. 455.3    • INJECTION OF MEDICATION  01/07/2016    Celestone (betamethasone) (2)        • JOINT REPLACEMENT      right knee 2008   • KNEE ARTHROSCOPY  07/09/2008     Right total knee atthroplasty. Osteoarthritis of the right knee.    • STOMACH SURGERY  1976    Revise stomach-bowel fusion (1)    history of jejunal surgery    • TONSILLECTOMY     • TUBAL ABDOMINAL LIGATION      • UPPER GASTROINTESTINAL ENDOSCOPY  03/31/2017        Current Outpatient Medications   Medication Instructions   • albuterol sulfate  (90 Base) MCG/ACT inhaler Every 4 Hours   • atorvastatin (LIPITOR) 40 mg, Oral, Daily   • Farxiga 5 mg, Oral, Daily   • fenofibrate (TRICOR) 54 mg, Oral, Daily   • furosemide (LASIX) 20 mg, Oral, Daily   • lisinopril (PRINIVIL,ZESTRIL) 20 mg, Oral, Daily   • metFORMIN (GLUCOPHAGE) 500 mg, Oral, 2 Times Daily With Meals   • metoprolol tartrate (LOPRESSOR) 50 mg, Oral, 2 Times Daily   • oxyCODONE-acetaminophen (PERCOCET)  MG per tablet 1 tablet, Oral, Every 6 Hours PRN   • rivaroxaban (XARELTO) 20 mg, Oral, Daily   • sertraline (ZOLOFT) 100 MG tablet TAKE 1 TABLET BY MOUTH IN  THE MORNING AND ONE-HALF  TABLET BY MOUTH IN THE  EVENING   • vitamin D (ERGOCALCIFEROL) 50,000 Units, Oral, Every 7 Days      Allergies   Allergen Reactions   • Adhesive Tape    • Celebrex [Celecoxib]    • Darvon [Propoxyphene]    • Demerol [Meperidine]    • Dilantin [Phenytoin]    • Dilaudid [Hydromorphone Hcl]    • Iodinated Contrast Media    • Latex    • Morphine And Related    • Nsaids    • Penicillins Swelling     Patient had a reaction to penicillin in 2010.  Reaction included hives and swelling.  Patient states she has tolerated cephalexin in the past.   • Phenergan [Promethazine]    • Vioxx [Rofecoxib]    • Vistaril [Hydroxyzine Hcl]         Diabetes  She presents for her follow-up diabetic visit. She has type 2 diabetes mellitus. Her disease course has been improving. There are no hypoglycemic associated symptoms. Pertinent negatives for hypoglycemia include no headaches. Associated symptoms include polyuria (diuretic and Farxiga new). Pertinent negatives for diabetes include no chest pain. There are no hypoglycemic complications. Risk factors for coronary artery disease include diabetes mellitus, obesity, sedentary lifestyle, hypertension and dyslipidemia. Current diabetic treatment  "includes oral agent (dual therapy). She is compliant with treatment most of the time (Cost of Farxiga co-pay a concern). Her home blood glucose trend is decreasing steadily. An ACE inhibitor/angiotensin II receptor blocker is being taken.   Hypertension  The problem is unchanged. The problem is controlled. Associated symptoms include peripheral edema and shortness of breath (with exertion (improving)). Pertinent negatives include no chest pain, headaches or palpitations. Current antihypertension treatment includes ACE inhibitors, diuretics and beta blockers. The current treatment provides significant improvement. Hypertensive end-organ damage includes kidney disease. scheduled to see nephrology 5/8/2023 for initial evaluation; have started on Farxiga..   Has appointment with electrophysiologist on Friday for paroxysmal afib.    Review of Systems   Constitutional: Negative.    HENT: Negative.    Eyes: Negative.    Respiratory: Positive for shortness of breath (with exertion (improving)). Negative for wheezing.    Cardiovascular: Positive for leg swelling (improving). Negative for chest pain and palpitations.   Gastrointestinal: Negative.    Endocrine: Positive for polyuria (diuretic and Farxiga new).   Genitourinary: Negative for dysuria, flank pain and hematuria.        Chronically incontinent    Musculoskeletal: Positive for arthralgias and myalgias.   Skin: Negative.    Neurological: Negative.  Negative for headaches.   Psychiatric/Behavioral: Negative.       .Objective   Vital Signs:   /62 (BP Location: Right arm, Patient Position: Sitting, Cuff Size: Large Adult)   Pulse 80   Temp 96 °F (35.6 °C)   Ht 162.6 cm (64.02\")   Wt 125 kg (275 lb)   SpO2 97%   BMI 47.17 kg/m²       Physical Exam  Vitals reviewed.   Constitutional:       General: She is not in acute distress.     Appearance: Normal appearance. She is not ill-appearing.      Comments: BMI: 47.17   HENT:      Head: Normocephalic and atraumatic. "      Right Ear: External ear normal.      Left Ear: External ear normal.      Nose: Nose normal.   Eyes:      General:         Right eye: No discharge.         Left eye: No discharge.   Cardiovascular:      Rate and Rhythm: Normal rate.   Pulmonary:      Effort: Pulmonary effort is normal. No respiratory distress.      Breath sounds: Normal breath sounds. No stridor. No wheezing, rhonchi or rales.   Abdominal:      General: There is no distension.      Palpations: Abdomen is soft.   Musculoskeletal:         General: Normal range of motion.      Cervical back: Normal range of motion and neck supple.      Right lower le+ Edema present.      Left lower le+ Edema present.   Neurological:      General: No focal deficit present.      Mental Status: She is alert and oriented to person, place, and time. Mental status is at baseline.      Gait: Gait normal.   Psychiatric:         Mood and Affect: Mood normal.         Behavior: Behavior normal.         Thought Content: Thought content normal.         Judgment: Judgment normal.       Improvement in lower extremity swelling.  No adventitious breath sounds  Result Review :     Common labs    Common Labs 23    [id number]    Glucose   162 (A)     BUN   16     Creatinine   1.01 (A)     Sodium   141     Potassium   4.3     Chloride   105     Calcium   9.8     Albumin   4.0     Total Bilirubin   0.4     Alkaline Phosphatase   56     AST (SGOT)   23     ALT (SGPT)   24     WBC 6.33       Hemoglobin 13.1       Hematocrit 39.4       Platelets 261       Total Cholesterol    159    Triglycerides    211 (A)    HDL Cholesterol    31 (A)    LDL Cholesterol     92    Hemoglobin A1C  7.50 (A)   6.5   (A) Abnormal value            A1C Last 3 Results    HGBA1C Last 3 Results 23   Hemoglobin A1C 7.50 (A) 6.5   (A) Abnormal value                   Assessment and Plan    Diagnoses and all orders for this visit:    1. Type 2 diabetes  mellitus with hyperglycemia, without long-term current use of insulin (Primary)  -     POC Glycosylated Hemoglobin (Hb A1C)    2. Stage 3a chronic kidney disease    3. Essential hypertension    4. Mixed hyperlipidemia      -Continue on Farxiga.  Provided with Farxiga discount card.  Great improvement in A1c (7.5 to 6.5) with initiation of SGLT2.  -Need fasting labs.  Orders already in place.  We will attempt to get urine microalbumin, however difficulty due to incontinence.  -Scheduled to see nephrology.  Will reassess kidney function with labs.  -Standards of care provided yearly eye exam recommended.  -Continue with lisinopril for elevated blood pressure.  -Continue with Tricor and Lipitor for mixed hyperlipidemia.  -Continue with Xarelto and Lopressor for paroxysmal A-fib.  Keep cardiology/electrophysiology appointment.    -Discussed plan of care with patient.    -Answered all of patient's questions.  -Patient agreeable to plan of care.    EMR Dragon/Transcription Disclaimer: Some of this note may be an electronic transcription/translation of spoken language to printed text.  The electronic translation of spoken language may permit erroneous, or at times, nonsensical words or phrases to be inadvertently transcribed. Although I have reviewed the note for such errors, some may still exist.     Follow Up   Return in about 3 months (around 7/5/2023) for Medicare Wellness.  Patient was given instructions and counseling regarding her condition or for health maintenance advice. Please see specific information pulled into the AVS if appropriate.       This document has been electronically signed by KAVITHA Mejias on April 8, 2023 21:28 CDT,.

## 2023-05-09 ENCOUNTER — LAB (OUTPATIENT)
Dept: LAB | Facility: HOSPITAL | Age: 78
End: 2023-05-09
Payer: MEDICARE

## 2023-05-09 DIAGNOSIS — R22.42 LOCALIZED SWELLING OF LEFT LOWER EXTREMITY: ICD-10-CM

## 2023-05-09 DIAGNOSIS — M81.0 SENILE OSTEOPOROSIS: ICD-10-CM

## 2023-05-09 DIAGNOSIS — N18.31 STAGE 3A CHRONIC KIDNEY DISEASE: ICD-10-CM

## 2023-05-09 DIAGNOSIS — E11.649 TYPE 2 DIABETES MELLITUS WITH HYPOGLYCEMIA WITHOUT COMA, WITHOUT LONG-TERM CURRENT USE OF INSULIN: ICD-10-CM

## 2023-05-09 DIAGNOSIS — E78.5 HYPERLIPIDEMIA, UNSPECIFIED HYPERLIPIDEMIA TYPE: ICD-10-CM

## 2023-05-09 LAB
ALBUMIN SERPL-MCNC: 3.9 G/DL (ref 3.5–5.2)
ALBUMIN/GLOB SERPL: 1.3 G/DL
ALP SERPL-CCNC: 69 U/L (ref 39–117)
ALT SERPL W P-5'-P-CCNC: 20 U/L (ref 1–33)
ANION GAP SERPL CALCULATED.3IONS-SCNC: 9 MMOL/L (ref 5–15)
AST SERPL-CCNC: 22 U/L (ref 1–32)
BILIRUB SERPL-MCNC: 0.4 MG/DL (ref 0–1.2)
BUN SERPL-MCNC: 18 MG/DL (ref 8–23)
BUN/CREAT SERPL: 18.9 (ref 7–25)
CALCIUM SPEC-SCNC: 9.5 MG/DL (ref 8.6–10.5)
CHLORIDE SERPL-SCNC: 101 MMOL/L (ref 98–107)
CHOLEST SERPL-MCNC: 129 MG/DL (ref 0–200)
CO2 SERPL-SCNC: 27 MMOL/L (ref 22–29)
CREAT SERPL-MCNC: 0.95 MG/DL (ref 0.57–1)
EGFRCR SERPLBLD CKD-EPI 2021: 62.2 ML/MIN/1.73
GLOBULIN UR ELPH-MCNC: 3 GM/DL
GLUCOSE SERPL-MCNC: 182 MG/DL (ref 65–99)
HBA1C MFR BLD: 7.7 % (ref 4.8–5.6)
HDLC SERPL-MCNC: 27 MG/DL (ref 40–60)
LDLC SERPL CALC-MCNC: 68 MG/DL (ref 0–100)
LDLC/HDLC SERPL: 2.26 {RATIO}
POTASSIUM SERPL-SCNC: 4.2 MMOL/L (ref 3.5–5.2)
PROT SERPL-MCNC: 6.9 G/DL (ref 6–8.5)
SODIUM SERPL-SCNC: 137 MMOL/L (ref 136–145)
TRIGL SERPL-MCNC: 205 MG/DL (ref 0–150)
VLDLC SERPL-MCNC: 34 MG/DL (ref 5–40)

## 2023-05-09 PROCEDURE — 80061 LIPID PANEL: CPT

## 2023-05-09 PROCEDURE — 83036 HEMOGLOBIN GLYCOSYLATED A1C: CPT

## 2023-05-09 PROCEDURE — 80053 COMPREHEN METABOLIC PANEL: CPT

## 2023-06-16 ENCOUNTER — OFFICE VISIT (OUTPATIENT)
Dept: FAMILY MEDICINE CLINIC | Facility: CLINIC | Age: 78
End: 2023-06-16
Payer: MEDICARE

## 2023-06-16 VITALS
HEIGHT: 64 IN | OXYGEN SATURATION: 96 % | BODY MASS INDEX: 46.95 KG/M2 | TEMPERATURE: 98.1 F | SYSTOLIC BLOOD PRESSURE: 110 MMHG | WEIGHT: 275 LBS | DIASTOLIC BLOOD PRESSURE: 80 MMHG | HEART RATE: 72 BPM

## 2023-06-16 DIAGNOSIS — E55.9 VITAMIN D DEFICIENCY: Chronic | ICD-10-CM

## 2023-06-16 DIAGNOSIS — R09.81 SINUS CONGESTION: ICD-10-CM

## 2023-06-16 DIAGNOSIS — L30.9 DERMATITIS: Primary | ICD-10-CM

## 2023-06-16 DIAGNOSIS — R06.2 WHEEZING: ICD-10-CM

## 2023-06-16 DIAGNOSIS — E11.9 TYPE 2 DIABETES MELLITUS WITHOUT COMPLICATION, WITHOUT LONG-TERM CURRENT USE OF INSULIN: Chronic | ICD-10-CM

## 2023-06-16 DIAGNOSIS — I10 ESSENTIAL HYPERTENSION: Chronic | ICD-10-CM

## 2023-06-16 DIAGNOSIS — I48.0 PAROXYSMAL ATRIAL FIBRILLATION: Chronic | ICD-10-CM

## 2023-06-16 RX ORDER — ALBUTEROL SULFATE 90 UG/1
2 AEROSOL, METERED RESPIRATORY (INHALATION) EVERY 6 HOURS PRN
Qty: 18 G | Refills: 6 | Status: SHIPPED | OUTPATIENT
Start: 2023-06-16

## 2023-06-16 RX ORDER — LISINOPRIL 20 MG/1
20 TABLET ORAL DAILY
Qty: 90 TABLET | Refills: 3 | Status: SHIPPED | OUTPATIENT
Start: 2023-06-16

## 2023-06-16 RX ORDER — TRIAMCINOLONE ACETONIDE 1 MG/G
1 CREAM TOPICAL 2 TIMES DAILY
Qty: 80 G | Refills: 0 | Status: SHIPPED | OUTPATIENT
Start: 2023-06-16

## 2023-06-16 RX ORDER — ERGOCALCIFEROL 1.25 MG/1
50000 CAPSULE ORAL
Qty: 15 CAPSULE | Refills: 3 | Status: SHIPPED | OUTPATIENT
Start: 2023-06-16

## 2023-06-16 NOTE — PROGRESS NOTES
"Chief Complaint  Illness (Drainage, ha, and ear pain) and Rash    Subjective        Juliana Alcazar presents to [facility] [place]  History of Present Illness  [facility]      PCP: KAVITHA Fried    CC: \"rash; sinuses; med concern\"    Pt reports that a small red rash appeared on the medial aspect of her left lower extremity one week ago. Pt reports that she has been outside more working in her yard. Pt thought it was poison ivy and had used IvyRest to get rid of the oils. This morning, pt noticed the rash had spread more up closer to her knee then down to her ankle. Pt reports that she has not used any other medications on the affected area. Pt reports that the rash is itchy and tingles. Pt denied any hx of DVT or PE, fever, numbness, and pain upon flexion of her toes.    Pt also reports that she is having some rhinorrhea and sinus congestion and pressure localized to the L side of her face and ear. Pt reports that the sinus pressure did not start bothering her until yesterday, but pt reports that the rhinorrhea is intermittent depending on how much she is outside. Pt reports that she has trouble with seasonal allergies around this same time every year. Pt reports she has been having to use her inhaler a couple of times this week as she has felt like she was wheezing. Pt reports that she has not taken any other allergy medications. Pt reports that she has taken Claritin and Zyrtec in the past with no ill effects. Pt reports she has been experiencing some post nasal drip, but does not endorse a sore throat. Pt denies any chest pain, palpitations, shortness of breath, fever, dizziness, or blurry vision. Does report occasional wheezing/reactive airway associated with her allergy symptoms.  Has albuterol, needing refill.     Pt also reports that she has been having trouble receiving her medications from her mail order pharmacy. Pt reports that she has " "made a call to the pharmacy and still has not received any medications as it says she does not have any refills. Pt reports that she has been out of her Lisinopril, Metformin, Xarelto, and Vitamin D for the past 3 weeks. Patient has multiple co-morbidities.     Objective   Vital Signs:  /80 (BP Location: Right arm, Patient Position: Sitting, Cuff Size: Large Adult)   Pulse 72   Temp 98.1 °F (36.7 °C) (Oral)   Ht 162.6 cm (64\")   Wt 125 kg (275 lb)   SpO2 96%   BMI 47.20 kg/m²   Estimated body mass index is 47.2 kg/m² as calculated from the following:    Height as of this encounter: 162.6 cm (64\").    Weight as of this encounter: 125 kg (275 lb).             Physical Exam  Constitutional:       General: She is not in acute distress.     Appearance: Normal appearance. She is not ill-appearing.   HENT:      Head: Normocephalic and atraumatic.      Right Ear: Tympanic membrane and ear canal normal. There is no impacted cerumen.      Left Ear: Tympanic membrane and ear canal normal. There is no impacted cerumen.      Nose: Congestion and rhinorrhea present.      Right Sinus: No maxillary sinus tenderness or frontal sinus tenderness.      Left Sinus: Maxillary sinus tenderness (minimal) and frontal sinus tenderness (minimal) present.      Mouth/Throat:      Mouth: Mucous membranes are moist.      Pharynx: Posterior oropharyngeal erythema present.      Comments: Post nasal drip    Eyes:      General:         Right eye: No discharge.         Left eye: No discharge.      Conjunctiva/sclera: Conjunctivae normal.   Cardiovascular:      Rate and Rhythm: Normal rate and regular rhythm.      Heart sounds: Normal heart sounds.   Pulmonary:      Effort: Pulmonary effort is normal. No respiratory distress.      Breath sounds: Normal breath sounds. No stridor. No wheezing or rhonchi.   Abdominal:      General: Bowel sounds are normal.      Palpations: Abdomen is soft.   Musculoskeletal:      Cervical back: Neck supple. No " tenderness.      Right lower leg: No edema.      Left lower leg: No edema.   Lymphadenopathy:      Cervical: No cervical adenopathy.   Skin:     General: Skin is warm.      Findings: Rash present. Rash is not crusting, macular, nodular, urticarial or vesicular.             Comments: Left lower extremity--pink, raised rash over medial aspect consistent with dermatitis     Neurological:      General: No focal deficit present.      Mental Status: She is alert and oriented to person, place, and time.   Psychiatric:         Mood and Affect: Mood normal.         Behavior: Behavior normal.         Judgment: Judgment normal.      Result Review :    Common labs          1/12/2023    09:58 4/5/2023    10:07 5/9/2023    08:06   Common Labs   Glucose 162   182    BUN 16   18    Creatinine 1.01   0.95    Sodium 141   137    Potassium 4.3   4.2    Chloride 105   101    Calcium 9.8   9.5    Albumin 4.0   3.9    Total Bilirubin 0.4   0.4    Alkaline Phosphatase 56   69    AST (SGOT) 23   22    ALT (SGPT) 24   20    WBC 6.33      Hemoglobin 13.1      Hematocrit 39.4      Platelets 261      Total Cholesterol 159   129    Triglycerides 211   205    HDL Cholesterol 31   27    LDL Cholesterol  92   68    Hemoglobin A1C 7.50  6.5  7.70                   Assessment and Plan   Diagnoses and all orders for this visit:    1. Dermatitis (Primary)  -     triamcinolone (KENALOG) 0.1 % cream; Apply 1 application topically to the appropriate area as directed 2 (Two) Times a Day.  Dispense: 80 g; Refill: 0    2. Sinus congestion  -     Chlorpheniramine-Phenylephrine 3.5-10 MG tablet; Take 1 tablet by mouth 3 (Three) Times a Day As Needed (sinus congestion).  Dispense: 24 tablet; Refill: 0    3. Paroxysmal atrial fibrillation  -     rivaroxaban (Xarelto) 20 MG tablet; Take 1 tablet by mouth Daily.  Dispense: 90 tablet; Refill: 3    4. Essential hypertension  -     lisinopril (PRINIVIL,ZESTRIL) 20 MG tablet; Take 1 tablet by mouth Daily.  Dispense:  90 tablet; Refill: 3    5. Type 2 diabetes mellitus without complication, without long-term current use of insulin  -     metFORMIN (GLUCOPHAGE) 500 MG tablet; Take 1 tablet by mouth 2 (Two) Times a Day With Meals.  Dispense: 180 tablet; Refill: 3    6. Vitamin D deficiency  -     vitamin D (ERGOCALCIFEROL) 1.25 MG (10723 UT) capsule capsule; Take 1 capsule by mouth Every 7 (Seven) Days.  Dispense: 15 capsule; Refill: 3    7. Wheezing  -     albuterol sulfate  (90 Base) MCG/ACT inhaler; Inhale 2 puffs Every 6 (Six) Hours As Needed for Wheezing.  Dispense: 18 g; Refill: 6    Cool compresses PRN. Elevate LLE when able.   Recheck for any worsening redness/swelling/pain in leg.  Rx for Triamcinalone cream provided.     Push fluids  Rest  Tylenol PRN  Rx for chlorpheniramine-phenylephrine provided to use PRN sinus congestion.   Offered nasal spray, she declines.   Recheck if frequent headaches, purulent nasal discharge develop    Consulted PCP, KAVITHA Benavides about refills--Refills for routine meds filled by PCP today--Xarelto, Lisinopril, Metformin, Vit D, albuterol     See PCP or RTC if symptoms persist/worsen  See PCP for routine f/u visit and management of chronic medical conditions    This document has been electronically signed by KAVITHA Crawford on June 16, 2023 15:21 CDT,.           I spent 35 minutes caring for Juliana on this date of service. This time includes time spent by me in the following activities:preparing for the visit, reviewing tests, performing a medically appropriate examination and/or evaluation , counseling and educating the patient/family/caregiver, ordering medications, tests, or procedures, documenting information in the medical record, and care coordination

## 2023-06-26 NOTE — TELEPHONE ENCOUNTER
PT called to request refills on the following medications:   · atorvastatin (LIPITOR) 40 MG tablet  · fenofibrate (TRICOR) 54 MG tablet  · furosemide (LASIX) 20 MG tablet  · lisinopril (PRINIVIL,ZESTRIL) 20 MG tablet  · metFORMIN (GLUCOPHAGE) 500 MG tablet  · rivaroxaban (XARELTO) 20 MG tablet  · sertraline (ZOLOFT) 100 MG tablet    Confirmed Pharmacy:  [facility] - [place], [place] - [address] - [phone]  - [phone] FX  [address] [place] [address]  [address]  [facility] [id number]  Phone: [phone] Fax: [phone]    States she doesn't mind to come in for a med refill appointment if needed, but she currently has a cast from where she fell and broke her wrist and she's older/previous cancer patient and isn't getting out of her house much. Unopposed to telemedicine visit, but would like to confirm with PCP that she needs to be seen. Last seen December 2019.         Writer spoke with patient and informed patient that in order to see a therapist here at [facility], he would need an [facility] PCP and they would have to send over a referral and then he would be put on a recall list for when openings occur within 90 days.  Patient will call insurance company to find other options.

## 2023-07-12 ENCOUNTER — OFFICE VISIT (OUTPATIENT)
Dept: FAMILY MEDICINE CLINIC | Facility: CLINIC | Age: 78
End: 2023-07-12
Payer: MEDICARE

## 2023-07-12 VITALS
TEMPERATURE: 97.5 F | WEIGHT: 279 LBS | HEART RATE: 75 BPM | OXYGEN SATURATION: 97 % | HEIGHT: 64 IN | DIASTOLIC BLOOD PRESSURE: 62 MMHG | BODY MASS INDEX: 47.63 KG/M2 | SYSTOLIC BLOOD PRESSURE: 104 MMHG

## 2023-07-12 DIAGNOSIS — F32.A DEPRESSION, UNSPECIFIED DEPRESSION TYPE: ICD-10-CM

## 2023-07-12 DIAGNOSIS — G47.33 OSA (OBSTRUCTIVE SLEEP APNEA): Primary | ICD-10-CM

## 2023-07-12 DIAGNOSIS — E78.2 MIXED HYPERLIPIDEMIA: ICD-10-CM

## 2023-07-12 PROCEDURE — G0439 PPPS, SUBSEQ VISIT: HCPCS

## 2023-07-12 PROCEDURE — 3074F SYST BP LT 130 MM HG: CPT

## 2023-07-12 PROCEDURE — 99213 OFFICE O/P EST LOW 20 MIN: CPT

## 2023-07-12 PROCEDURE — 3078F DIAST BP <80 MM HG: CPT

## 2023-07-12 RX ORDER — SERTRALINE HYDROCHLORIDE 100 MG/1
100 TABLET, FILM COATED ORAL 2 TIMES DAILY
Start: 2023-07-12 | End: 2023-08-14 | Stop reason: SDUPTHER

## 2023-07-12 NOTE — PROGRESS NOTES
The ABCs of the Annual Wellness Visit  [facility]    Subjective     Juliana Alcazar is a 77 y.o. female who presents for a  Welcome to Medicare Visit.    The following portions of the patient's history were reviewed and   updated as appropriate: allergies, current medications, past family history, past medical history, past social history, past surgical history, and problem list.     Compared to one year ago, the patient feels her physical   health is worse.    Compared to one year ago, the patient feels her mental   health is worse.    Recent Hospitalizations:  She was not admitted to the hospital during the last year.       Current Medical Providers:  Patient Care Team:  Lavern Barreto APRN as PCP - General (Family Medicine)  SUJATA Nielsen MD as Consulting Physician (Cardiac Electrophysiology)    Outpatient Medications Prior to Visit   Medication Sig Dispense Refill    albuterol sulfate  (90 Base) MCG/ACT inhaler Inhale 2 puffs Every 6 (Six) Hours As Needed for Wheezing. 18 g 6    Chlorpheniramine-Phenylephrine 3.5-10 MG tablet Take 1 tablet by mouth 3 (Three) Times a Day As Needed (sinus congestion). 24 tablet 0    dapagliflozin (Farxiga) 5 MG tablet tablet Take 1 tablet by mouth Daily. 90 tablet 1    lisinopril (PRINIVIL,ZESTRIL) 20 MG tablet Take 1 tablet by mouth Daily. 90 tablet 3    metFORMIN (GLUCOPHAGE) 500 MG tablet Take 1 tablet by mouth 2 (Two) Times a Day With Meals. 180 tablet 3    metoprolol tartrate (LOPRESSOR) 50 MG tablet Take 1 tablet by mouth 2 (Two) Times a Day. 180 tablet 3    oxyCODONE-acetaminophen (PERCOCET)  MG per tablet Take 1 tablet by mouth Every 6 (Six) Hours As Needed for Moderate Pain.      rivaroxaban (Xarelto) 20 MG tablet Take 1 tablet by mouth Daily. 90 tablet 3    triamcinolone (KENALOG) 0.1 % cream Apply 1 application topically to the appropriate area as directed 2 (Two) Times a Day. 80 g 0    vitamin D (ERGOCALCIFEROL) 1.25 MG (00489 UT)  capsule capsule Take 1 capsule by mouth Every 7 (Seven) Days. 15 capsule 3    atorvastatin (LIPITOR) 40 MG tablet Take 1 tablet by mouth Daily. 90 tablet 3    furosemide (LASIX) 20 MG tablet Take 1 tablet by mouth Daily. 90 tablet 1    sertraline (ZOLOFT) 100 MG tablet TAKE 1 TABLET BY MOUTH IN  THE MORNING AND ONE-HALF  TABLET BY MOUTH IN THE  EVENING 135 tablet 3    fenofibrate (TRICOR) 54 MG tablet TAKE 1 TABLET BY MOUTH  DAILY (Patient not taking: Reported on 6/16/2023) 90 tablet 1     No facility-administered medications prior to visit.       Opioid medication/s are on active medication list.  and I have evaluated her active treatment plan and pain score trends (see table).  Vitals:    07/12/23 0917   PainSc:   8   PainLoc: Knee     I have reviewed the chart for potential of high risk medication and harmful drug interactions in the elderly.          Aspirin is not on active medication list.  Aspirin use is contraindicated for this patient due to: current use of Xarelto.  .    Patient Active Problem List   Diagnosis    Arthralgia of right hip    Thoracic back pain    Depression    Gastroesophageal reflux disease without esophagitis    Type 2 diabetes mellitus without complication, without long-term current use of insulin    Essential hypertension    Hyperlipidemia    Chronic left-sided low back pain without sciatica    BMI 45.0-49.9, adult    Polypharmacy    Chest pain    Chronic pain    Paroxysmal atrial fibrillation    Vitamin D deficiency    Stage 3a chronic kidney disease    Postmenopausal    History of bariatric surgery    Senile osteoporosis     Advance Care Planning   Advance Care Planning     Advance Directive is not on file.  ACP discussion was declined by the patient. Patient does not have an advance directive, information provided.       Objective   Vitals:    07/12/23 0917   BP: 104/62   BP Location: Left arm   Patient Position: Sitting   Cuff Size: Large Adult   Pulse: 75   Temp: 97.5 øF (36.4 øC)  "  SpO2: 97%   Weight: 127 kg (279 lb)   Height: 162.6 cm (64\")   PainSc:   8   PainLoc: Knee     Estimated body mass index is 47.89 kg/mý as calculated from the following:    Height as of this encounter: 162.6 cm (64\").    Weight as of this encounter: 127 kg (279 lb).    Class 3 Severe Obesity (BMI >=40). Obesity-related health conditions include the following: obstructive sleep apnea, hypertension, dyslipidemias, and osteoarthritis. Obesity is unchanged. BMI is is above average; BMI management plan is completed. We discussed portion control and increasing exercise.      Does the patient have evidence of cognitive impairment?   No           Procedures       HEALTH RISK ASSESSMENT    Smoking Status:  Social History     Tobacco Use   Smoking Status Never   Smokeless Tobacco Never     Alcohol Consumption:  Social History     Substance and Sexual Activity   Alcohol Use No       Fall Risk Screen:    STEADI Fall Risk Assessment was completed, and patient is at MODERATE risk for falls. Assessment completed on:2023    Depression Screen:       2023     9:12 AM   PHQ-2/PHQ-9 Depression Screening   Little Interest or Pleasure in Doing Things 0-->not at all   Feeling Down, Depressed or Hopeless 0-->not at all   PHQ-9: Brief Depression Severity Measure Score 0       Health Habits and Functional and Cognitive Screenin/12/2023     9:26 AM   Functional & Cognitive Status   Do you have difficulty preparing food and eating? No   Do you have difficulty bathing yourself, getting dressed or grooming yourself? No   Do you have difficulty using the toilet? No   Do you have difficulty moving around from place to place? Yes   Do you have trouble with steps or getting out of a bed or a chair? No   Current Diet Well Balanced Diet   Dental Exam Up to date   Eye Exam Not up to date   Exercise (times per week) 2 times per week   Current Exercises Include House Cleaning   Do you need help using the phone?  No   Are you deaf or " do you have serious difficulty hearing?  No   Do you need help to go to places out of walking distance? Yes   Do you need help shopping? No   Do you need help preparing meals?  No   Do you need help with housework?  Yes   Do you need help with laundry? No   Do you need help taking your medications? No   Do you need help managing money? No   Do you ever drive or ride in a car without wearing a seat belt? No   Have you felt unusual stress, anger or loneliness in the last month? Yes   Who do you live with? Spouse   If you need help, do you have trouble finding someone available to you? No   Have you been bothered in the last four weeks by sexual problems? No   Do you have difficulty concentrating, remembering or making decisions? No       Visual Acuity:    No results found.    Age-appropriate Screening Schedule:  Refer to the list below for future screening recommendations based on patient's age, sex and/or medical conditions. Orders for these recommended tests are listed in the plan section. The patient has been provided with a written plan.    Health Maintenance   Topic Date Due    URINE MICROALBUMIN  10/25/2020    DIABETIC EYE EXAM  08/31/2021    COVID-19 Vaccine (6 - Moderna series) 02/28/2023    ANNUAL WELLNESS VISIT  04/25/2023    INFLUENZA VACCINE  10/01/2023    HEMOGLOBIN A1C  11/09/2023    LIPID PANEL  05/09/2024    DXA SCAN  05/26/2024    TDAP/TD VACCINES (2 - Td or Tdap) 01/30/2027    COLORECTAL CANCER SCREENING  03/31/2027    HEPATITIS C SCREENING  Completed    Pneumococcal Vaccine 65+  Completed    ZOSTER VACCINE  Discontinued        CMS Preventative Services Quick Reference  Risk Factors Identified During Encounter    Fall Risk-High or Moderate: Discussed Fall Prevention in the home and Information on Fall Prevention Shared in After Visit Summary discussed shower chair  Polypharmacy: Medication List reviewed and Medications are appropriate for patient  Urinary Incontinence:  Has previously tried  "medications, Kegel exercises, and PT, no avail. Wears incontinence pads, has worsened with back injury.  The above risks/problems have been discussed with the patient.  Pertinent information has been shared with the patient in the After Visit Summary.    Follow Up:   Initial Medicare Visit in one year    An After Visit Summary and PPPS were made available to the patient.      Additional E&M Note during same encounter follows:  Patient has multiple medical problems which are significant and separately identifiable that require additional work above and beyond the Medicare Wellness Visit.      Chief Complaint  Medicare Wellness-subsequent    Subjective        HPI  Juliana Alcazar is also being seen today for Depression and JEANNETTE.      Patient reports Zoloft is somewhat effective in controlling depression.  Is curious if she can take 100 mg of Zoloft in the morning and at night.  Some difficulty with falling asleep.    Patient also requests prescription for CPAP supplies.  Patient has history of obstructive sleep apnea wears CPAP due for supply change no new machine needed.    Review of Systems   Constitutional: Negative.    HENT: Negative.     Eyes: Negative.    Respiratory:  Positive for apnea and shortness of breath (with exertion).    Cardiovascular:  Positive for leg swelling.   Genitourinary:         Urinary incontinence   Musculoskeletal:  Negative for arthralgias and myalgias.   Psychiatric/Behavioral:  Positive for dysphoric mood.      Objective   Vital Signs:  /62 (BP Location: Left arm, Patient Position: Sitting, Cuff Size: Large Adult)   Pulse 75   Temp 97.5 øF (36.4 øC)   Ht 162.6 cm (64\")   Wt 127 kg (279 lb)   SpO2 97%   BMI 47.89 kg/mý     Physical Exam  Vitals reviewed.   Constitutional:       General: She is not in acute distress.     Appearance: Normal appearance. She is not ill-appearing.      Comments: Body mass index is 47.89 kg/mý.     HENT:      Head: Normocephalic and atraumatic.      " Right Ear: External ear normal.      Left Ear: External ear normal.      Nose: Nose normal.   Eyes:      General:         Right eye: No discharge.         Left eye: No discharge.   Cardiovascular:      Rate and Rhythm: Normal rate.   Pulmonary:      Effort: Pulmonary effort is normal. No respiratory distress.      Breath sounds: Normal breath sounds. No stridor. No wheezing, rhonchi or rales.   Abdominal:      General: There is no distension.      Palpations: Abdomen is soft.   Musculoskeletal:         General: Normal range of motion.      Cervical back: Normal range of motion and neck supple.      Right lower le+ Edema present.      Left lower le+ Edema present.   Neurological:      General: No focal deficit present.      Mental Status: She is alert and oriented to person, place, and time. Mental status is at baseline.      Gait: Gait normal.   Psychiatric:         Mood and Affect: Mood normal.         Behavior: Behavior normal.         Thought Content: Thought content normal.         Judgment: Judgment normal.        The following data was reviewed by: KAVITHA Mejias on 2023:  Common labs          2023    09:58 2023    10:07 2023    08:06   Common Labs   Glucose 162   182    BUN 16   18    Creatinine 1.01   0.95    Sodium 141   137    Potassium 4.3   4.2    Chloride 105   101    Calcium 9.8   9.5    Albumin 4.0   3.9    Total Bilirubin 0.4   0.4    Alkaline Phosphatase 56   69    AST (SGOT) 23   22    ALT (SGPT) 24   20    WBC 6.33      Hemoglobin 13.1      Hematocrit 39.4      Platelets 261      Total Cholesterol 159   129    Triglycerides 211   205    HDL Cholesterol 31   27    LDL Cholesterol  92   68    Hemoglobin A1C 7.50  6.5  7.70      TSH          2023    09:58   TSH   TSH 1.860               Assessment and Plan   Diagnoses and all orders for this visit:    1. JEANNETTE (obstructive sleep apnea) (Primary)  -     Miscellaneous DME    2. Depression, unspecified depression  type  -     Discontinue: sertraline (ZOLOFT) 100 MG tablet; Take 1 tablet by mouth 2 (Two) Times a Day. TAKE 1 TABLET BY MOUTH IN  THE MORNING AND ONE-HALF  TABLET BY MOUTH IN THE  EVENING  -     sertraline (ZOLOFT) 100 MG tablet; Take 1 tablet by mouth 2 (Two) Times a Day.    3. Mixed hyperlipidemia  -     Lipid panel; Future      -Would recommend increasing Zoloft 100 mg twice daily as an increase from 100 mg in the morning and 50 mg at bedtime.  Increase bedtime dose may assist with improved sleep and depression.  -CPAP supplies at [facility] medical needed, DME prescription provided.  -We will plan on cholesterol recheck.       Follow Up   Return in about 3 months (around 10/12/2023) for Recheck fasting lab between now and next appt.  Patient was given instructions and counseling regarding her condition or for health maintenance advice. Please see specific information pulled into the AVS if appropriate.         This document has been electronically signed by KAVITHA Mejias on August 14, 2023 06:00 CDT,.

## 2023-07-12 NOTE — PATIENT INSTRUCTIONS
Medicare Wellness  Personal Prevention Plan of Service     Date of Office Visit:    Encounter Provider:  KAVITAH Mejias  Place of Service:  [facility] [place]  Patient Name: Juliana Alcazar  :  1945    As part of the Medicare Wellness portion of your visit today, we are providing you with this personalized preventive plan of services (PPPS). This plan is based upon recommendations of the [place] Preventive Services Task Force (USPSTF) and the Advisory Committee on Immunization Practices (ACIP).    This lists the preventive care services that should be considered, and provides dates of when you are due. Items listed as completed are up-to-date and do not require any further intervention.    Health Maintenance   Topic Date Due    URINE MICROALBUMIN  10/25/2020    DIABETIC EYE EXAM  2021    COVID-19 Vaccine (6 - Moderna series) 2023    INFLUENZA VACCINE  10/01/2023    HEMOGLOBIN A1C  2023    LIPID PANEL  2024    DXA SCAN  2024    ANNUAL WELLNESS VISIT  2024    TDAP/TD VACCINES (2 - Td or Tdap) 2027    COLORECTAL CANCER SCREENING  2027    HEPATITIS C SCREENING  Completed    Pneumococcal Vaccine 65+  Completed    ZOSTER VACCINE  Discontinued       Orders Placed This Encounter   Procedures    Miscellaneous DME     Order Specific Question:   Type of DME     Answer:   CPAP supplies.     Order Specific Question:   Length of Need (99 Months = Lifetime)     Answer:   99 Months = Lifetime    Lipid panel     Standing Status:   Future     Standing Expiration Date:   2024       Return in about 3 months (around 10/12/2023) for Recheck fasting lab between now and next appt.

## 2023-08-02 DIAGNOSIS — E78.5 HYPERLIPIDEMIA, UNSPECIFIED HYPERLIPIDEMIA TYPE: ICD-10-CM

## 2023-08-02 RX ORDER — ATORVASTATIN CALCIUM 40 MG/1
40 TABLET, FILM COATED ORAL DAILY
Qty: 90 TABLET | Refills: 3 | Status: SHIPPED | OUTPATIENT
Start: 2023-08-02

## 2023-08-03 DIAGNOSIS — E11.649 TYPE 2 DIABETES MELLITUS WITH HYPOGLYCEMIA WITHOUT COMA, WITHOUT LONG-TERM CURRENT USE OF INSULIN: ICD-10-CM

## 2023-08-03 DIAGNOSIS — N18.31 STAGE 3A CHRONIC KIDNEY DISEASE: ICD-10-CM

## 2023-08-03 DIAGNOSIS — R22.42 LOCALIZED SWELLING OF LEFT LOWER EXTREMITY: ICD-10-CM

## 2023-08-03 RX ORDER — FUROSEMIDE 20 MG/1
20 TABLET ORAL DAILY
Qty: 90 TABLET | Refills: 3 | Status: SHIPPED | OUTPATIENT
Start: 2023-08-03

## 2023-08-14 RX ORDER — SERTRALINE HYDROCHLORIDE 100 MG/1
100 TABLET, FILM COATED ORAL 2 TIMES DAILY
Start: 2023-08-14

## (undated) DEVICE — BITEBLOCK ENDO W/STRAP 60F A/ LF DISP

## (undated) DEVICE — CANN SMPL SOFTECH BIFLO ETCO2 A/M 7FT